# Patient Record
Sex: FEMALE | Race: BLACK OR AFRICAN AMERICAN | Employment: FULL TIME | URBAN - METROPOLITAN AREA
[De-identification: names, ages, dates, MRNs, and addresses within clinical notes are randomized per-mention and may not be internally consistent; named-entity substitution may affect disease eponyms.]

---

## 2018-04-26 LAB
ANTIBODY: NORMAL
HIV AG/AB: NORMAL
HIV AG/AB: NORMAL

## 2019-11-25 ENCOUNTER — HOSPITAL ENCOUNTER (OUTPATIENT)
Dept: GENERAL RADIOLOGY | Age: 59
Discharge: HOME | End: 2019-11-25
Payer: COMMERCIAL

## 2019-11-25 ENCOUNTER — HOSPITAL ENCOUNTER (OUTPATIENT)
Age: 59
Discharge: HOME | End: 2019-11-25
Payer: COMMERCIAL

## 2019-11-25 ENCOUNTER — OFFICE VISIT (OUTPATIENT)
Dept: PRIMARY CARE CLINIC | Age: 59
End: 2019-11-25
Payer: COMMERCIAL

## 2019-11-25 VITALS
WEIGHT: 177.8 LBS | TEMPERATURE: 98.2 F | HEART RATE: 61 BPM | HEIGHT: 62 IN | SYSTOLIC BLOOD PRESSURE: 130 MMHG | BODY MASS INDEX: 32.72 KG/M2 | OXYGEN SATURATION: 98 % | RESPIRATION RATE: 14 BRPM | DIASTOLIC BLOOD PRESSURE: 84 MMHG

## 2019-11-25 DIAGNOSIS — M79.672 BILATERAL FOOT PAIN: ICD-10-CM

## 2019-11-25 DIAGNOSIS — I10 ESSENTIAL HYPERTENSION: ICD-10-CM

## 2019-11-25 DIAGNOSIS — M79.671 BILATERAL FOOT PAIN: ICD-10-CM

## 2019-11-25 DIAGNOSIS — M54.2 NECK PAIN: Primary | ICD-10-CM

## 2019-11-25 DIAGNOSIS — M79.604 BILATERAL LEG PAIN: ICD-10-CM

## 2019-11-25 DIAGNOSIS — M25.572 LEFT ANKLE PAIN, UNSPECIFIED CHRONICITY: ICD-10-CM

## 2019-11-25 DIAGNOSIS — E03.9 ACQUIRED HYPOTHYROIDISM: ICD-10-CM

## 2019-11-25 DIAGNOSIS — M79.605 BILATERAL LEG PAIN: ICD-10-CM

## 2019-11-25 DIAGNOSIS — L30.9 DERMATITIS: ICD-10-CM

## 2019-11-25 LAB
ANION GAP SERPL CALCULATED.3IONS-SCNC: 16 MMOL/L (ref 3–16)
BUN BLDV-MCNC: 18 MG/DL (ref 7–20)
CALCIUM SERPL-MCNC: 10 MG/DL (ref 8.3–10.6)
CHLORIDE BLD-SCNC: 95 MMOL/L (ref 99–110)
CO2: 30 MMOL/L (ref 21–32)
CREAT SERPL-MCNC: 0.6 MG/DL (ref 0.6–1.2)
GFR AFRICAN AMERICAN: >60
GFR NON-AFRICAN AMERICAN: >60
GLUCOSE BLD-MCNC: 112 MG/DL (ref 70–99)
MAGNESIUM: 2.1 MG/DL (ref 1.8–2.4)
POTASSIUM SERPL-SCNC: 3.4 MMOL/L (ref 3.5–5.1)
SEDIMENTATION RATE, ERYTHROCYTE: 16 MM/HR (ref 0–30)
SODIUM BLD-SCNC: 141 MMOL/L (ref 136–145)
TOTAL CK: 273 U/L (ref 26–192)
TSH SERPL DL<=0.05 MIU/L-ACNC: 2.39 UIU/ML (ref 0.27–4.2)
VITAMIN D 25-HYDROXY: 38.9 NG/ML

## 2019-11-25 PROCEDURE — 73630 X-RAY EXAM OF FOOT: CPT

## 2019-11-25 PROCEDURE — 99204 OFFICE O/P NEW MOD 45 MIN: CPT | Performed by: INTERNAL MEDICINE

## 2019-11-25 PROCEDURE — 73610 X-RAY EXAM OF ANKLE: CPT

## 2019-11-25 RX ORDER — MAGNESIUM GLUCONATE 30 MG(550)
30 TABLET ORAL 2 TIMES DAILY
Status: ON HOLD | COMMUNITY
End: 2020-07-27 | Stop reason: HOSPADM

## 2019-11-25 RX ORDER — NAPROXEN SODIUM 220 MG
220-440 TABLET ORAL PRN
COMMUNITY
End: 2020-05-05

## 2019-11-25 RX ORDER — CHLORTHALIDONE 50 MG/1
50 TABLET ORAL DAILY
Status: ON HOLD | COMMUNITY
Start: 2019-11-25 | End: 2020-07-27 | Stop reason: HOSPADM

## 2019-11-25 RX ORDER — LEVOTHYROXINE SODIUM 88 UG/1
88 TABLET ORAL DAILY
COMMUNITY
Start: 2019-02-07 | End: 2021-02-23 | Stop reason: SDUPTHER

## 2019-11-25 RX ORDER — METHOCARBAMOL 500 MG/1
500 TABLET, FILM COATED ORAL NIGHTLY PRN
Qty: 15 TABLET | Refills: 0 | Status: SHIPPED | OUTPATIENT
Start: 2019-11-25 | End: 2020-05-05

## 2019-11-25 SDOH — HEALTH STABILITY: MENTAL HEALTH: HOW OFTEN DO YOU HAVE A DRINK CONTAINING ALCOHOL?: MONTHLY OR LESS

## 2019-11-25 SDOH — HEALTH STABILITY: MENTAL HEALTH: HOW MANY STANDARD DRINKS CONTAINING ALCOHOL DO YOU HAVE ON A TYPICAL DAY?: 1 OR 2

## 2019-11-25 ASSESSMENT — ENCOUNTER SYMPTOMS
BLOOD IN STOOL: 0
VOMITING: 0
WHEEZING: 0
DIARRHEA: 0
CHEST TIGHTNESS: 0
ABDOMINAL PAIN: 0
NAUSEA: 0
RHINORRHEA: 0
SINUS PRESSURE: 0
CONSTIPATION: 0
COUGH: 0
TROUBLE SWALLOWING: 0
SHORTNESS OF BREATH: 0
SORE THROAT: 0

## 2019-11-25 ASSESSMENT — PATIENT HEALTH QUESTIONNAIRE - PHQ9
SUM OF ALL RESPONSES TO PHQ QUESTIONS 1-9: 0
1. LITTLE INTEREST OR PLEASURE IN DOING THINGS: 0
SUM OF ALL RESPONSES TO PHQ QUESTIONS 1-9: 0
2. FEELING DOWN, DEPRESSED OR HOPELESS: 0
SUM OF ALL RESPONSES TO PHQ9 QUESTIONS 1 & 2: 0

## 2019-12-23 ENCOUNTER — OFFICE VISIT (OUTPATIENT)
Dept: PRIMARY CARE CLINIC | Age: 59
End: 2019-12-23
Payer: COMMERCIAL

## 2019-12-23 VITALS
DIASTOLIC BLOOD PRESSURE: 76 MMHG | WEIGHT: 179 LBS | HEART RATE: 85 BPM | TEMPERATURE: 98.1 F | RESPIRATION RATE: 14 BRPM | HEIGHT: 62 IN | OXYGEN SATURATION: 96 % | BODY MASS INDEX: 32.94 KG/M2 | SYSTOLIC BLOOD PRESSURE: 130 MMHG

## 2019-12-23 DIAGNOSIS — M79.672 BILATERAL FOOT PAIN: ICD-10-CM

## 2019-12-23 DIAGNOSIS — M79.605 BILATERAL LEG PAIN: ICD-10-CM

## 2019-12-23 DIAGNOSIS — M54.2 NECK PAIN: ICD-10-CM

## 2019-12-23 DIAGNOSIS — M25.572 LEFT ANKLE PAIN, UNSPECIFIED CHRONICITY: ICD-10-CM

## 2019-12-23 DIAGNOSIS — Z13.6 SCREENING FOR ISCHEMIC HEART DISEASE (IHD): ICD-10-CM

## 2019-12-23 DIAGNOSIS — E03.9 ACQUIRED HYPOTHYROIDISM: ICD-10-CM

## 2019-12-23 DIAGNOSIS — R74.8 ELEVATED CK: ICD-10-CM

## 2019-12-23 DIAGNOSIS — R73.9 HYPERGLYCEMIA: ICD-10-CM

## 2019-12-23 DIAGNOSIS — M79.604 BILATERAL LEG PAIN: ICD-10-CM

## 2019-12-23 DIAGNOSIS — Z00.00 ANNUAL PHYSICAL EXAM: Primary | ICD-10-CM

## 2019-12-23 DIAGNOSIS — M79.671 BILATERAL FOOT PAIN: ICD-10-CM

## 2019-12-23 DIAGNOSIS — M84.375A STRESS FRACTURE OF LEFT FOOT, INITIAL ENCOUNTER: ICD-10-CM

## 2019-12-23 DIAGNOSIS — I10 ESSENTIAL HYPERTENSION: ICD-10-CM

## 2019-12-23 DIAGNOSIS — F51.01 PRIMARY INSOMNIA: ICD-10-CM

## 2019-12-23 DIAGNOSIS — R94.31 ABNORMAL EKG: ICD-10-CM

## 2019-12-23 LAB
BILIRUBIN URINE: NEGATIVE
BLOOD, URINE: NEGATIVE
CLARITY: CLEAR
COLOR: YELLOW
GLUCOSE URINE: NEGATIVE MG/DL
KETONES, URINE: NEGATIVE MG/DL
LEUKOCYTE ESTERASE, URINE: NEGATIVE
MICROSCOPIC EXAMINATION: NORMAL
NITRITE, URINE: NEGATIVE
PH UA: 7 (ref 5–8)
PROTEIN UA: NEGATIVE MG/DL
SPECIFIC GRAVITY UA: 1.01 (ref 1–1.03)
URINE TYPE: NORMAL
UROBILINOGEN, URINE: 0.2 E.U./DL

## 2019-12-23 PROCEDURE — 81003 URINALYSIS AUTO W/O SCOPE: CPT | Performed by: INTERNAL MEDICINE

## 2019-12-23 PROCEDURE — 93000 ELECTROCARDIOGRAM COMPLETE: CPT | Performed by: INTERNAL MEDICINE

## 2019-12-23 PROCEDURE — 99396 PREV VISIT EST AGE 40-64: CPT | Performed by: INTERNAL MEDICINE

## 2019-12-23 RX ORDER — TRAZODONE HYDROCHLORIDE 50 MG/1
50 TABLET ORAL NIGHTLY PRN
Qty: 30 TABLET | Refills: 0 | Status: SHIPPED | OUTPATIENT
Start: 2019-12-23 | End: 2020-01-20

## 2019-12-27 DIAGNOSIS — R73.9 HYPERGLYCEMIA: ICD-10-CM

## 2019-12-27 DIAGNOSIS — Z00.00 ANNUAL PHYSICAL EXAM: ICD-10-CM

## 2019-12-27 DIAGNOSIS — R74.8 ELEVATED CK: ICD-10-CM

## 2019-12-27 LAB
A/G RATIO: 2 (ref 1.1–2.2)
ALBUMIN SERPL-MCNC: 4.6 G/DL (ref 3.4–5)
ALP BLD-CCNC: 64 U/L (ref 40–129)
ALT SERPL-CCNC: 26 U/L (ref 10–40)
ANION GAP SERPL CALCULATED.3IONS-SCNC: 15 MMOL/L (ref 3–16)
AST SERPL-CCNC: 20 U/L (ref 15–37)
BASOPHILS ABSOLUTE: 0 K/UL (ref 0–0.2)
BASOPHILS RELATIVE PERCENT: 0.4 %
BILIRUB SERPL-MCNC: 0.6 MG/DL (ref 0–1)
BUN BLDV-MCNC: 14 MG/DL (ref 7–20)
CALCIUM SERPL-MCNC: 9.9 MG/DL (ref 8.3–10.6)
CHLORIDE BLD-SCNC: 93 MMOL/L (ref 99–110)
CHOLESTEROL, TOTAL: 195 MG/DL (ref 0–199)
CO2: 32 MMOL/L (ref 21–32)
CREAT SERPL-MCNC: 0.8 MG/DL (ref 0.6–1.2)
EOSINOPHILS ABSOLUTE: 0.1 K/UL (ref 0–0.6)
EOSINOPHILS RELATIVE PERCENT: 1.1 %
GFR AFRICAN AMERICAN: >60
GFR NON-AFRICAN AMERICAN: >60
GLOBULIN: 2.3 G/DL
GLUCOSE BLD-MCNC: 128 MG/DL (ref 70–99)
HCT VFR BLD CALC: 44.2 % (ref 36–48)
HDLC SERPL-MCNC: 61 MG/DL (ref 40–60)
HEMOGLOBIN: 14.9 G/DL (ref 12–16)
LDL CHOLESTEROL CALCULATED: 112 MG/DL
LYMPHOCYTES ABSOLUTE: 1.4 K/UL (ref 1–5.1)
LYMPHOCYTES RELATIVE PERCENT: 23.5 %
MCH RBC QN AUTO: 30.1 PG (ref 26–34)
MCHC RBC AUTO-ENTMCNC: 33.7 G/DL (ref 31–36)
MCV RBC AUTO: 89.3 FL (ref 80–100)
MONOCYTES ABSOLUTE: 0.6 K/UL (ref 0–1.3)
MONOCYTES RELATIVE PERCENT: 10.7 %
NEUTROPHILS ABSOLUTE: 3.8 K/UL (ref 1.7–7.7)
NEUTROPHILS RELATIVE PERCENT: 64.3 %
PDW BLD-RTO: 13.9 % (ref 12.4–15.4)
PLATELET # BLD: 198 K/UL (ref 135–450)
PMV BLD AUTO: 9.1 FL (ref 5–10.5)
POTASSIUM SERPL-SCNC: 3.5 MMOL/L (ref 3.5–5.1)
RBC # BLD: 4.95 M/UL (ref 4–5.2)
SODIUM BLD-SCNC: 140 MMOL/L (ref 136–145)
TOTAL CK: 213 U/L (ref 26–192)
TOTAL PROTEIN: 6.9 G/DL (ref 6.4–8.2)
TRIGL SERPL-MCNC: 111 MG/DL (ref 0–150)
VLDLC SERPL CALC-MCNC: 22 MG/DL
WBC # BLD: 6 K/UL (ref 4–11)

## 2019-12-28 LAB
ESTIMATED AVERAGE GLUCOSE: 122.6 MG/DL
HBA1C MFR BLD: 5.9 %

## 2019-12-29 ASSESSMENT — ENCOUNTER SYMPTOMS
SINUS PRESSURE: 0
RECTAL PAIN: 0
WHEEZING: 0
TROUBLE SWALLOWING: 0
SHORTNESS OF BREATH: 0
APNEA: 0
ABDOMINAL DISTENTION: 0
PHOTOPHOBIA: 0
NAUSEA: 0
CHOKING: 0
RHINORRHEA: 0
EYE ITCHING: 0
CHEST TIGHTNESS: 0
FACIAL SWELLING: 0
SORE THROAT: 0
BACK PAIN: 0
DIARRHEA: 0
ANAL BLEEDING: 0
EYE PAIN: 0
VOMITING: 0
COUGH: 0
CONSTIPATION: 0
BLOOD IN STOOL: 0
SINUS PAIN: 0
VOICE CHANGE: 0
EYE DISCHARGE: 0
EYE REDNESS: 0
ABDOMINAL PAIN: 0

## 2020-01-20 RX ORDER — TRAZODONE HYDROCHLORIDE 50 MG/1
TABLET ORAL
Qty: 30 TABLET | Refills: 2 | Status: SHIPPED | OUTPATIENT
Start: 2020-01-20 | End: 2020-03-13 | Stop reason: SDUPTHER

## 2020-01-20 NOTE — TELEPHONE ENCOUNTER
Medication:   Requested Prescriptions     Pending Prescriptions Disp Refills    traZODone (DESYREL) 50 MG tablet [Pharmacy Med Name: traZODone 50 MG TABLET] 30 tablet 0     Sig: TAKE ONE TABLET BY MOUTH ONCE NIGHTLY AS NEEDED FOR SLEEP        Last Filled:      Patient Phone Number: [phone] (home) [phone] (work)    Last appt: 12/23/2019   Next appt: 1/31/2020    Last OARRS: No flowsheet data found.     Preferred Pharmacy:   [facility] [address] [place], [place] [address]Arabella Ragsdale   [address] [facility] [address]  Phone: [phone] Fax: [phone]

## 2020-01-31 ENCOUNTER — OFFICE VISIT (OUTPATIENT)
Dept: PRIMARY CARE CLINIC | Age: 60
End: 2020-01-31
Payer: COMMERCIAL

## 2020-01-31 VITALS
BODY MASS INDEX: 32.76 KG/M2 | SYSTOLIC BLOOD PRESSURE: 128 MMHG | DIASTOLIC BLOOD PRESSURE: 60 MMHG | HEART RATE: 85 BPM | HEIGHT: 62 IN | OXYGEN SATURATION: 99 % | WEIGHT: 178 LBS

## 2020-01-31 PROCEDURE — 99213 OFFICE O/P EST LOW 20 MIN: CPT | Performed by: INTERNAL MEDICINE

## 2020-01-31 RX ORDER — TRAZODONE HYDROCHLORIDE 100 MG/1
100 TABLET ORAL NIGHTLY PRN
Qty: 30 TABLET | Refills: 2 | Status: SHIPPED | OUTPATIENT
Start: 2020-01-31 | End: 2020-03-13 | Stop reason: SDUPTHER

## 2020-01-31 SDOH — ECONOMIC STABILITY: FOOD INSECURITY: WITHIN THE PAST 12 MONTHS, THE FOOD YOU BOUGHT JUST DIDN'T LAST AND YOU DIDN'T HAVE MONEY TO GET MORE.: NEVER TRUE

## 2020-01-31 SDOH — ECONOMIC STABILITY: TRANSPORTATION INSECURITY
IN THE PAST 12 MONTHS, HAS THE LACK OF TRANSPORTATION KEPT YOU FROM MEDICAL APPOINTMENTS OR FROM GETTING MEDICATIONS?: NO

## 2020-01-31 SDOH — ECONOMIC STABILITY: INCOME INSECURITY: HOW HARD IS IT FOR YOU TO PAY FOR THE VERY BASICS LIKE FOOD, HOUSING, MEDICAL CARE, AND HEATING?: NOT HARD AT ALL

## 2020-01-31 SDOH — ECONOMIC STABILITY: FOOD INSECURITY: WITHIN THE PAST 12 MONTHS, YOU WORRIED THAT YOUR FOOD WOULD RUN OUT BEFORE YOU GOT MONEY TO BUY MORE.: NEVER TRUE

## 2020-01-31 SDOH — ECONOMIC STABILITY: TRANSPORTATION INSECURITY
IN THE PAST 12 MONTHS, HAS LACK OF TRANSPORTATION KEPT YOU FROM MEETINGS, WORK, OR FROM GETTING THINGS NEEDED FOR DAILY LIVING?: NO

## 2020-01-31 ASSESSMENT — PATIENT HEALTH QUESTIONNAIRE - PHQ9
SUM OF ALL RESPONSES TO PHQ9 QUESTIONS 1 & 2: 0
SUM OF ALL RESPONSES TO PHQ QUESTIONS 1-9: 0
1. LITTLE INTEREST OR PLEASURE IN DOING THINGS: 0
2. FEELING DOWN, DEPRESSED OR HOPELESS: 0
SUM OF ALL RESPONSES TO PHQ QUESTIONS 1-9: 0

## 2020-01-31 ASSESSMENT — ENCOUNTER SYMPTOMS
ABDOMINAL PAIN: 0
SHORTNESS OF BREATH: 0

## 2020-01-31 NOTE — PROGRESS NOTES
Clau Colindres   Date ofBirth:  1960    Date of Visit:  1/31/2020    Chief Complaint   Patient presents with    Insomnia    Results       HPI  Primary insomnia-Pt takes Trazodone 50mg po qhs. Pt states it is helping her fall asleep but she wakes and it takes awhile to fall back asleep. Pt denies side effects. Pt also here to follow up on lab results. Review of Systems   Constitutional: Negative for activity change and fatigue. Respiratory: Negative for shortness of breath. Cardiovascular: Negative for chest pain. Gastrointestinal: Negative for abdominal pain. Neurological: Negative for dizziness and headaches. Psychiatric/Behavioral: Positive for sleep disturbance. Negative for decreased concentration and dysphoric mood. The patient is not nervous/anxious. No Known Allergies  Outpatient Medications Marked as Taking for the 1/31/20 encounter (Office Visit) with Adriana Henao MD   Medication Sig Dispense Refill    traZODone (DESYREL) 50 MG tablet TAKE ONE TABLET BY MOUTH ONCE NIGHTLY AS NEEDED FOR SLEEP 30 tablet 2    metoprolol tartrate (LOPRESSOR) 25 MG tablet Take 25 mg by mouth daily      Magnesium Gluconate 550 MG TABS Take 30 mg by mouth 2 times daily      levothyroxine (SYNTHROID) 88 MCG tablet Take 88 mcg by mouth daily      progesterone (PROMETRIUM) 200 MG capsule Take 200 mg by mouth nightly      naproxen sodium (ANAPROX) 220 MG tablet Take 220-440 mg by mouth as needed      diclofenac (SOLARAZE) 3 % gel Apply 3 % topically 2 times daily      methocarbamol (ROBAXIN) 500 MG tablet Take 1 tablet by mouth nightly as needed (low back pain) 15 tablet 0    chlorthalidone (HYGROTEN) 50 MG tablet Take 1 tablet by mouth daily           Vitals:    01/31/20 1001   BP: 128/60   Site: Right Upper Arm   Position: Sitting   Cuff Size: Medium Adult   Pulse: 85   SpO2: 99%   Weight: 178 lb (80.7 kg)   Height: 5' 2\" (1.575 m)     Body mass index is 32.56 kg/m².      Physical Exam  Nursing note reviewed. Constitutional:       General: She is not in acute distress. Appearance: She is well-developed. HENT:      Mouth/Throat:      Pharynx: Oropharynx is clear. Eyes:      Extraocular Movements: Extraocular movements intact. Pupils: Pupils are equal, round, and reactive to light. Cardiovascular:      Rate and Rhythm: Normal rate and regular rhythm. Heart sounds: Normal heart sounds, S1 normal and S2 normal. No murmur. Pulmonary:      Effort: Pulmonary effort is normal. No respiratory distress. Breath sounds: Normal breath sounds. Psychiatric:         Mood and Affect: Mood normal.           No results found for this visit on 01/31/20.   Lab Review   Orders Only on 12/27/2019   Component Date Value    Total CK 12/27/2019 213*    WBC 12/27/2019 6.0     RBC 12/27/2019 4.95     Hemoglobin 12/27/2019 14.9     Hematocrit 12/27/2019 44.2     MCV 12/27/2019 89.3     MCH 12/27/2019 30.1     MCHC 12/27/2019 33.7     RDW 12/27/2019 13.9     Platelets 56/36/0949 198     MPV 12/27/2019 9.1     Neutrophils % 12/27/2019 64.3     Lymphocytes % 12/27/2019 23.5     Monocytes % 12/27/2019 10.7     Eosinophils % 12/27/2019 1.1     Basophils % 12/27/2019 0.4     Neutrophils Absolute 12/27/2019 3.8     Lymphocytes Absolute 12/27/2019 1.4     Monocytes Absolute 12/27/2019 0.6     Eosinophils Absolute 12/27/2019 0.1     Basophils Absolute 12/27/2019 0.0     Cholesterol, Total 12/27/2019 195     Triglycerides 12/27/2019 111     HDL 12/27/2019 61*    LDL Calculated 12/27/2019 112*    VLDL Cholesterol Calcula* 12/27/2019 22     Hemoglobin A1C 12/27/2019 5.9     eAG 12/27/2019 122.6     Sodium 12/27/2019 140     Potassium 12/27/2019 3.5     Chloride 12/27/2019 93*    CO2 12/27/2019 32     Anion Gap 12/27/2019 15     Glucose 12/27/2019 128*    BUN 12/27/2019 14     CREATININE 12/27/2019 0.8     GFR Non- 12/27/2019 >60     GFR  American 12/27/2019 >60     Calcium 12/27/2019 9.9     Total Protein 12/27/2019 6.9     Alb 12/27/2019 4.6     Albumin/Globulin Ratio 12/27/2019 2.0     Total Bilirubin 12/27/2019 0.6     Alkaline Phosphatase 12/27/2019 64     ALT 12/27/2019 26     AST 12/27/2019 20     Globulin 12/27/2019 2.3          Assessment/Plan     1. Primary insomnia  - Increase traZODone (DESYREL) 100 MG tablet; Take 1 tablet by mouth nightly as needed for Sleep  Dispense: 30 tablet; Refill: 2    2. Prediabetes  -Low carbohydrate diet  -Regular aerobic exercise    3. Elevated CK  - CK; Future      Discussed medications with patient, who voiced understanding of their use and indications. All questions answered. Return in about 6 weeks (around 3/13/2020) for insomnia.

## 2020-01-31 NOTE — PATIENT INSTRUCTIONS
1. Primary insomnia  - Increase traZODone (DESYREL) 100 MG tablet; Take 1 tablet by mouth nightly as needed for Sleep  Dispense: 30 tablet; Refill: 2    2. Prediabetes  -Low carbohydrate diet  -Regular aerobic exercise    3. Elevated CK  - CK;  Future

## 2020-03-13 ENCOUNTER — OFFICE VISIT (OUTPATIENT)
Dept: PRIMARY CARE CLINIC | Age: 60
End: 2020-03-13
Payer: COMMERCIAL

## 2020-03-13 VITALS
DIASTOLIC BLOOD PRESSURE: 80 MMHG | SYSTOLIC BLOOD PRESSURE: 128 MMHG | HEART RATE: 61 BPM | BODY MASS INDEX: 32.2 KG/M2 | HEIGHT: 62 IN | OXYGEN SATURATION: 98 % | WEIGHT: 175 LBS

## 2020-03-13 DIAGNOSIS — R74.8 ELEVATED CK: ICD-10-CM

## 2020-03-13 LAB — TOTAL CK: 307 U/L (ref 26–192)

## 2020-03-13 PROCEDURE — 99213 OFFICE O/P EST LOW 20 MIN: CPT | Performed by: INTERNAL MEDICINE

## 2020-03-13 RX ORDER — TRAZODONE HYDROCHLORIDE 50 MG/1
25 TABLET ORAL NIGHTLY
Qty: 15 TABLET | Refills: 3 | Status: ON HOLD
Start: 2020-03-13 | End: 2020-07-27 | Stop reason: HOSPADM

## 2020-03-13 RX ORDER — TRAZODONE HYDROCHLORIDE 100 MG/1
100 TABLET ORAL NIGHTLY PRN
Qty: 30 TABLET | Refills: 3 | Status: SHIPPED | OUTPATIENT
Start: 2020-03-13 | End: 2020-09-10

## 2020-03-13 NOTE — PATIENT INSTRUCTIONS
1. Primary insomnia  - stable  - Continue traZODone (DESYREL) 50 MG tablet; Take 0.5 tablets by mouth nightly  Dispense: 15 tablet; Refill: 3  - Continue traZODone (DESYREL) 100 MG tablet; Take 1 tablet by mouth nightly as needed for Sleep  Dispense: 30 tablet; Refill: 3    2. Right hip pain  - XR HIP RIGHT (2-3 VIEWS); Future  - Tylenol or Aleve as needed  - Referral to [facility] MD LUCIO, Orthopedic Surgery, [facility]    3.  Right leg pain  -Tylenol or Aleve as needed  - Referral to [facility] MD LUCIO, Orthopedic Surgery, [facility]

## 2020-03-13 NOTE — PROGRESS NOTES
Neutrophils Absolute 12/27/2019 3.8     Lymphocytes Absolute 12/27/2019 1.4     Monocytes Absolute 12/27/2019 0.6     Eosinophils Absolute 12/27/2019 0.1     Basophils Absolute 12/27/2019 0.0     Cholesterol, Total 12/27/2019 195     Triglycerides 12/27/2019 111     HDL 12/27/2019 61*    LDL Calculated 12/27/2019 112*    VLDL Cholesterol Calcula* 12/27/2019 22     Hemoglobin A1C 12/27/2019 5.9     eAG 12/27/2019 122.6     Sodium 12/27/2019 140     Potassium 12/27/2019 3.5     Chloride 12/27/2019 93*    CO2 12/27/2019 32     Anion Gap 12/27/2019 15     Glucose 12/27/2019 128*    BUN 12/27/2019 14     CREATININE 12/27/2019 0.8     GFR Non- 12/27/2019 >60     GFR  12/27/2019 >60     Calcium 12/27/2019 9.9     Total Protein 12/27/2019 6.9     Alb 12/27/2019 4.6     Albumin/Globulin Ratio 12/27/2019 2.0     Total Bilirubin 12/27/2019 0.6     Alkaline Phosphatase 12/27/2019 64     ALT 12/27/2019 26     AST 12/27/2019 20     Globulin 12/27/2019 2.3    Office Visit on 12/23/2019   Component Date Value    Color, UA 12/23/2019 YELLOW     Clarity, UA 12/23/2019 Clear     Glucose, Ur 12/23/2019 Negative     Bilirubin Urine 12/23/2019 Negative     Ketones, Urine 12/23/2019 Negative     Specific Gravity, UA 12/23/2019 1.012     Blood, Urine 12/23/2019 Negative     pH, UA 12/23/2019 7.0     Protein, UA 12/23/2019 Negative     Urobilinogen, Urine 12/23/2019 0.2     Nitrite, Urine 12/23/2019 Negative     Leukocyte Esterase, Urine 12/23/2019 Negative     Microscopic Examination 12/23/2019 Not Indicated     Urine Type 12/23/2019 Cleancatch    Orders Only on 11/25/2019   Component Date Value    Sodium 11/25/2019 141     Potassium 11/25/2019 3.4*    Chloride 11/25/2019 95*    CO2 11/25/2019 30     Anion Gap 11/25/2019 16     Glucose 11/25/2019 112*    BUN 11/25/2019 18     CREATININE 11/25/2019 0.6     GFR Non- 11/25/2019 >60     GFR  11/25/2019 >60     Calcium 11/25/2019 10.0     Sed Rate 11/25/2019 16     Vit D, 25-Hydroxy 11/25/2019 38.9     Total CK 11/25/2019 273*    TSH 11/25/2019 2.39     Magnesium 11/25/2019 2.10          Assessment/Plan     1. Primary insomnia  - stable  - Continue traZODone (DESYREL) 50 MG tablet; Take 0.5 tablets by mouth nightly  Dispense: 15 tablet; Refill: 3  - Continue traZODone (DESYREL) 100 MG tablet; Take 1 tablet by mouth nightly as needed for Sleep  Dispense: 30 tablet; Refill: 3    2. Right hip pain  - XR HIP RIGHT (2-3 VIEWS); Future  - Tylenol or Aleve as needed  - Referral to [facility] MD LUCIO, Orthopedic Surgery, [facility]    3. Right leg pain  -Tylenol or Aleve as needed  - Referral to [facility] MD LUCIO, Orthopedic Surgery, [facility]        Discussed medications with patient, who voiced understanding of their use and indications. All questions answered. Return in about 3 months (around 6/13/2020) for insomnia, hypertension, prediabetes, hypothyroidism.

## 2020-03-18 ASSESSMENT — ENCOUNTER SYMPTOMS
COUGH: 0
SHORTNESS OF BREATH: 0
ABDOMINAL PAIN: 0
CHEST TIGHTNESS: 0

## 2020-03-24 ENCOUNTER — TELEPHONE (OUTPATIENT)
Dept: PRIMARY CARE CLINIC | Age: 60
End: 2020-03-24

## 2020-05-03 NOTE — PROGRESS NOTES
evidence of synovitis. Total CPK, CRP and ESR wnl on 3/30/17. Pt was diagnosed w/ OA but did not return for f/u. Denies known FHx of autoimmune disease. She is a former smoker of 5 yrs. REVIEW OF SYSTEMS:    Constitutional: worsening insomnia (gets 4-5 hrs of sleep/night) and fatigue since the beginning of 2020 per pt Sx worsened after menopause, denies fever/chills, night sweats, unintentional weight loss  Integumentary: denies photosensitivity, rash, patchy alopecia, or Sx of Raynaud's phenomenon  Eyes: denies dry eyes, redness or pain, visual disturbance, or floaters  Ears: denies hearing loss, tinnitus, vertigo, or recurrent ear infections  Nose: denies nasal ulcers or recurrent sinusitis  Oral cavity: denies dry mouth or oral ulcers  Cardiovascular: denies CP, palpitations, Hx of pericardial effusion or pericarditis  Respiratory: denies SOB, cough, hemoptysis, or pleurisy  Gastrointestinal: denies heart burn, dysphagia or esophageal dysmotility, denies change in bowel habits or Sx of IBD  Genitourinary: denies change in urine amount or urine appearance, denies frothy urine or Hx of nephrolithiasis  Hematologic/Lymphatic: denies abnormal bruising or bleeding, denies Hx of blood clots or recurrent miscarriages, denies swollen LNs  Musculoskeletal:  refer to above HPI   Neurological: denies focal weakness, paresthesias/hyperesthesias or change in sensation, denies Hx of seizure, denies change in gait, balance, or memory  Psychiatric: denies Hx of depression or anxiety  Endocrine: denies cold or heat intolerance  Allergic/Immunologic: denies nasal congestion, chronic asthma, or hives    Past Medical History:   Diagnosis Date    Acquired hypothyroidism 11/25/2019    Essential hypertension 11/25/2019        No past surgical history on file.     Social History     Socioeconomic History    Marital status:      Spouse name: Not on file    Number of children: Not on file    Years of education: Not tartrate (LOPRESSOR) 25 MG tablet, Take 25 mg by mouth daily, Disp: , Rfl:     Magnesium Gluconate 550 MG TABS, Take 30 mg by mouth 2 times daily, Disp: , Rfl:     levothyroxine (SYNTHROID) 88 MCG tablet, Take 88 mcg by mouth daily, Disp: , Rfl:     progesterone (PROMETRIUM) 200 MG capsule, Take 200 mg by mouth nightly, Disp: , Rfl:     diclofenac (SOLARAZE) 3 % gel, Apply 3 % topically 2 times daily, Disp: , Rfl:     chlorthalidone (HYGROTEN) 50 MG tablet, Take 1 tablet by mouth daily, Disp: , Rfl:     ALLERGIES:  Patient has no known allergies.     PHYSICAL EXAM:    Vitals:    /74   Pulse 65   Ht 5' 2\" (1.575 m)   Wt 175 lb (79.4 kg)   BMI 32.01 kg/m²     GEN: AAOx3, in NAD, well-appearing  HEAD: normocephalic, atraumatic  EYES: EOMI, PERRLA, no injection or icterus  NECK: supple w/ FROM, of evidence of lymphadenopathy  CVS: RRR  LUNGS: in no acute respiratory distress, CTAB  ABDOMEN: +BS, soft, NT/ND  LYMPH: no cervical, supraclavicular or axillary LAD  MSK:  Spine: no kyphosis or lordosis, C-spine w/ good ROM, there is myofascial tenderness over the R posterior flank region radiating down to the R SI joint which is also TTP, straight leg test on R side produces R lower back/hip discomfort, negative straight leg test on L side  Upper extremities:   Hands: no synovitis or dactylitis, +[place] nodes, joints NTTP, able to make strong full fists   Wrist: no synovitis in the wrist joints b/l, FROM   Elbow: no synovitis or bursitis, FROM   Shoulders: no pain or swelling or warmth on palpation, FROM  Lower extremities: no calf tenderness on R side, no palpable mass or swelling appreciated   Hip: +BERRY test on R side produces ipsilateral hip/buttock pain, -BERRY test on L side, trochanteric bursa NTTP b/l   Knees: no warmth or effusion present, FROM   Ankles: no synovitis, FROM, Achilles tendons w/o swelling or warmth NTTP   Feet: no toe swelling or pain or warmth on palpation w/ FROM, negative MTP appreciated in the other extremities. Discussed the clinical significance of her mildly elevated CPK, repeat along w/ aldolase, myositis panel and inflammatory markers today. Low clinical suspicion for autoimmune myositis. Pt also denies Hx of statin exposure. Suspect her R medial calf/shin pain could be referred from hip/low back pathology. Agree w/ X-ray of R hip and ordered X-ray of L-spine in addition. Consider MRI of R hip if negative radiographic findings, need to r/o fx/AVN. Start trial of Flexeril 5-10 mg QHS. Pt reports partial relief to Naproxen 440 mg daily, increase dose to 500 mg BID PRN. She may combine this w/ Acetaminophen. Orders Placed This Encounter   Procedures    XR LUMBAR SPINE (2-3 VIEWS)     Standing Status:   Future     Number of Occurrences:   1     Standing Expiration Date:   5/5/2021     Order Specific Question:   Reason for exam:     Answer:   evaluate for inflammatory changes of spondyloarthropathies vs degenerative arthritis    CBC Auto Differential     Standing Status:   Future     Standing Expiration Date:   11/5/2020    Comprehensive Metabolic Panel     Standing Status:   Future     Standing Expiration Date:   11/5/2020    C-Reactive Protein     Standing Status:   Future     Standing Expiration Date:   11/5/2020    Sedimentation Rate     Standing Status:   Future     Standing Expiration Date:   11/5/2020    CK     Standing Status:   Future     Standing Expiration Date:   6/5/2020    Aldolase     Standing Status:   Future     Standing Expiration Date:   6/5/2020    Miscellaneous Lab Test #1     Standing Status:   Future     Standing Expiration Date:   5/5/2021     Order Specific Question:   Specify Req.  Test (1 Test/Order)     Answer:   Myositis Panel [id number]    TSH WITH REFLEX TO FT4     Standing Status:   Future     Standing Expiration Date:   9/5/2020     Outpatient Encounter Medications as of 5/5/2020   Medication Sig Dispense Refill    naproxen

## 2020-05-05 ENCOUNTER — HOSPITAL ENCOUNTER (OUTPATIENT)
Dept: GENERAL RADIOLOGY | Age: 60
Discharge: HOME | End: 2020-05-05
Payer: COMMERCIAL

## 2020-05-05 ENCOUNTER — HOSPITAL ENCOUNTER (OUTPATIENT)
Age: 60
Discharge: HOME | End: 2020-05-05
Payer: COMMERCIAL

## 2020-05-05 ENCOUNTER — OFFICE VISIT (OUTPATIENT)
Dept: RHEUMATOLOGY | Age: 60
End: 2020-05-05
Payer: COMMERCIAL

## 2020-05-05 VITALS
DIASTOLIC BLOOD PRESSURE: 74 MMHG | WEIGHT: 175 LBS | HEIGHT: 62 IN | HEART RATE: 65 BPM | SYSTOLIC BLOOD PRESSURE: 117 MMHG | BODY MASS INDEX: 32.2 KG/M2

## 2020-05-05 PROCEDURE — 73502 X-RAY EXAM HIP UNI 2-3 VIEWS: CPT

## 2020-05-05 PROCEDURE — 99214 OFFICE O/P EST MOD 30 MIN: CPT | Performed by: INTERNAL MEDICINE

## 2020-05-05 PROCEDURE — 72100 X-RAY EXAM L-S SPINE 2/3 VWS: CPT

## 2020-05-05 RX ORDER — NAPROXEN 500 MG/1
500 TABLET ORAL 2 TIMES DAILY WITH MEALS
Qty: 60 TABLET | Refills: 0 | Status: ON HOLD
Start: 2020-05-05 | End: 2020-07-27 | Stop reason: HOSPADM

## 2020-05-05 RX ORDER — CYCLOBENZAPRINE HCL 5 MG
10 TABLET ORAL NIGHTLY PRN
Qty: 60 TABLET | Refills: 0 | Status: SHIPPED | OUTPATIENT
Start: 2020-05-05 | End: 2020-06-04

## 2020-05-05 NOTE — PATIENT INSTRUCTIONS
After your visit with Dr. Zeny Mariee today, please obtain all labs and imaging as ordered prior to your 2 week follow up visit. Please make sure to obtain all X-rays at a [facility]) facility as Dr. Zeny Mariee needs to personally review the films to make the proper diagnosis for you. If you are referred to another doctor, make sure to call the provided number to schedule an appointment for yourself. If you dont hear anything from that doctors office after a few business days, please give our office a call so we help you or reach out to them on your own if you can find their contact information    Please note:   Lab and imaging results will be discussed at follow up appointments (not over the phone or via [phone]). If you would like a copy of your labs before your follow up appointment, you can call the office and request for them to be mailed to you. Take pictures on your phone! Many manifestations of rheumatic disease are transient. If you take a picture of your bothersome physical finding (rash, swollen joint, ulcer, etc), we will have more information at your next appointment. Prescriptions and refills will be handled at scheduled office visits and enough medicine will be prescribed to last at least until the patients next appointment. Since we expect to fill prescriptions at the office visit, running out may be a signal that it is time to call our office to schedule an appointment    Patients with rheumatic disease are more susceptible to a variety of infections, whether or not they are on medicine to suppress their immune system.  Please discuss vaccinations with your primary care doctor,  including but not limited to:  o Influenza vaccination (yearly)  o Pneumococcal vaccinations  o Shingles vaccination  o HPV vaccines (SLE patients have a higher rate of HPV infection and precancerous cervical lesions)  o TDaP vaccination  o Hepatitis B vaccination    Patients with rheumatic disease are at a higher risk

## 2020-05-18 ENCOUNTER — HOSPITAL ENCOUNTER (OUTPATIENT)
Age: 60
Discharge: HOME | End: 2020-05-18
Payer: COMMERCIAL

## 2020-05-18 LAB
A/G RATIO: 1.6 (ref 1.1–2.2)
ALBUMIN SERPL-MCNC: 4.4 G/DL (ref 3.4–5)
ALP BLD-CCNC: 56 U/L (ref 40–129)
ALT SERPL-CCNC: 20 U/L (ref 10–40)
ANION GAP SERPL CALCULATED.3IONS-SCNC: 12 MMOL/L (ref 3–16)
AST SERPL-CCNC: 20 U/L (ref 15–37)
BASOPHILS ABSOLUTE: 0 K/UL (ref 0–0.2)
BASOPHILS RELATIVE PERCENT: 0.1 %
BILIRUB SERPL-MCNC: 0.5 MG/DL (ref 0–1)
BUN BLDV-MCNC: 16 MG/DL (ref 7–20)
C-REACTIVE PROTEIN: 0.9 MG/L (ref 0–5.1)
CALCIUM SERPL-MCNC: 9.6 MG/DL (ref 8.3–10.6)
CHLORIDE BLD-SCNC: 96 MMOL/L (ref 99–110)
CO2: 31 MMOL/L (ref 21–32)
CREAT SERPL-MCNC: 0.8 MG/DL (ref 0.6–1.2)
EOSINOPHILS ABSOLUTE: 0.1 K/UL (ref 0–0.6)
EOSINOPHILS RELATIVE PERCENT: 1.1 %
GFR AFRICAN AMERICAN: >60
GFR NON-AFRICAN AMERICAN: >60
GLOBULIN: 2.8 G/DL
GLUCOSE BLD-MCNC: 114 MG/DL (ref 70–99)
HCT VFR BLD CALC: 45.1 % (ref 36–48)
HEMOGLOBIN: 15.1 G/DL (ref 12–16)
LYMPHOCYTES ABSOLUTE: 1.5 K/UL (ref 1–5.1)
LYMPHOCYTES RELATIVE PERCENT: 22.6 %
MCH RBC QN AUTO: 30.1 PG (ref 26–34)
MCHC RBC AUTO-ENTMCNC: 33.6 G/DL (ref 31–36)
MCV RBC AUTO: 89.6 FL (ref 80–100)
MONOCYTES ABSOLUTE: 0.5 K/UL (ref 0–1.3)
MONOCYTES RELATIVE PERCENT: 8.2 %
NEUTROPHILS ABSOLUTE: 4.5 K/UL (ref 1.7–7.7)
NEUTROPHILS RELATIVE PERCENT: 68 %
PDW BLD-RTO: 14 % (ref 12.4–15.4)
PLATELET # BLD: 185 K/UL (ref 135–450)
PMV BLD AUTO: 9 FL (ref 5–10.5)
POTASSIUM SERPL-SCNC: 3.4 MMOL/L (ref 3.5–5.1)
RBC # BLD: 5.03 M/UL (ref 4–5.2)
SEDIMENTATION RATE, ERYTHROCYTE: 10 MM/HR (ref 0–30)
SODIUM BLD-SCNC: 139 MMOL/L (ref 136–145)
T4 FREE: 1.5 NG/DL (ref 0.9–1.8)
TOTAL CK: 344 U/L (ref 26–192)
TOTAL PROTEIN: 7.2 G/DL (ref 6.4–8.2)
TSH REFLEX FT4: 4.27 UIU/ML (ref 0.27–4.2)
WBC # BLD: 6.7 K/UL (ref 4–11)

## 2020-05-18 PROCEDURE — 80053 COMPREHEN METABOLIC PANEL: CPT

## 2020-05-18 PROCEDURE — 83516 IMMUNOASSAY NONANTIBODY: CPT

## 2020-05-18 PROCEDURE — 85025 COMPLETE CBC W/AUTO DIFF WBC: CPT

## 2020-05-18 PROCEDURE — 82085 ASSAY OF ALDOLASE: CPT

## 2020-05-18 PROCEDURE — 86235 NUCLEAR ANTIGEN ANTIBODY: CPT

## 2020-05-18 PROCEDURE — 86140 C-REACTIVE PROTEIN: CPT

## 2020-05-18 PROCEDURE — 36415 COLL VENOUS BLD VENIPUNCTURE: CPT

## 2020-05-18 PROCEDURE — 84443 ASSAY THYROID STIM HORMONE: CPT

## 2020-05-18 PROCEDURE — 85652 RBC SED RATE AUTOMATED: CPT

## 2020-05-18 PROCEDURE — 82550 ASSAY OF CK (CPK): CPT

## 2020-05-18 PROCEDURE — 84439 ASSAY OF FREE THYROXINE: CPT

## 2020-05-20 LAB — ALDOLASE: 4.9 U/L (ref 1.5–8.1)

## 2020-05-20 NOTE — PROGRESS NOTES
other joint pain, swelling or morning stiffness. Denies any cough, SOB, dysphagia, or rash. She denies any statin exposure. Rheumatologic ROS:  Constitutional: worsening insomnia (gets 4-5 hrs of sleep/night) and fatigue since the beginning of 2020 per pt Sx worsened after menopause, denies fever/chills, night sweats, unintentional weight loss  Integumentary: denies photosensitivity, rash, patchy alopecia, or Sx of Raynaud's phenomenon  Eyes: denies dry eyes, redness or pain, visual disturbance, or floaters  Ears: denies hearing loss, tinnitus, vertigo, or recurrent ear infections  Nose: denies nasal ulcers or recurrent sinusitis  Oral cavity: denies dry mouth or oral ulcers  Cardiovascular: denies CP, palpitations, Hx of pericardial effusion or pericarditis  Respiratory: denies SOB, cough, hemoptysis, or pleurisy  Gastrointestinal: denies heart burn, dysphagia or esophageal dysmotility, denies change in bowel habits or Sx of IBD  Musculoskeletal:  refer to above HPI     No Known Allergies    Past Medical History:        Diagnosis Date    Acquired hypothyroidism 11/25/2019    Essential hypertension 11/25/2019       Past Surgical History:    No past surgical history on file.     Medications:    Current Outpatient Medications   Medication Sig Dispense Refill    methocarbamol (ROBAXIN-750) 750 MG tablet Take 1 tablet by mouth 3 times daily 90 tablet 2    naproxen (NAPROSYN) 500 MG tablet Take 1 tablet by mouth 2 times daily (with meals) 60 tablet 0    cyclobenzaprine (FLEXERIL) 5 MG tablet Take 2 tablets by mouth nightly as needed for Muscle spasms 60 tablet 0    traZODone (DESYREL) 50 MG tablet Take 0.5 tablets by mouth nightly 15 tablet 3    traZODone (DESYREL) 100 MG tablet Take 1 tablet by mouth nightly as needed for Sleep 30 tablet 3    metoprolol tartrate (LOPRESSOR) 25 MG tablet Take 25 mg by mouth daily      Magnesium Gluconate 550 MG TABS Take 30 mg by mouth 2 times daily      levothyroxine

## 2020-05-22 ENCOUNTER — VIRTUAL VISIT (OUTPATIENT)
Dept: RHEUMATOLOGY | Age: 60
End: 2020-05-22
Payer: COMMERCIAL

## 2020-05-22 PROCEDURE — 99213 OFFICE O/P EST LOW 20 MIN: CPT | Performed by: INTERNAL MEDICINE

## 2020-05-22 RX ORDER — METHOCARBAMOL 750 MG/1
750 TABLET, FILM COATED ORAL 3 TIMES DAILY
Qty: 90 TABLET | Refills: 2 | Status: SHIPPED | OUTPATIENT
Start: 2020-05-22 | End: 2020-06-21

## 2020-06-01 LAB — MISCELLANEOUS LAB TEST ORDER: NORMAL

## 2020-06-26 ENCOUNTER — OFFICE VISIT (OUTPATIENT)
Dept: PRIMARY CARE CLINIC | Age: 60
End: 2020-06-26
Payer: COMMERCIAL

## 2020-06-26 PROCEDURE — 99213 OFFICE O/P EST LOW 20 MIN: CPT | Performed by: NURSE PRACTITIONER

## 2020-06-26 NOTE — PROGRESS NOTES
FLU/COVID-19 CLINIC EVALUATION  HPI: Patient is in office today with concern for a known close exposure to COVID-19 Virus. The patient is requesting screening evaluation and COVID-19 testing. Symptom duration, days:  [] 1   [] 2    []3   [] 4    []5    []6   [] 7    []8    []9   [] 10    []11 []  12   [] 13    []14 or greater    There were no vitals filed for this visit. ROS:  All systems reviewed and are negative unless marked. Constitutional: []Fevers []Chills   HENT: []Nasal Congestion []Loss of taste/smell []Sore throat   Respiratory: []Cough []Chest Congestion []Chest Congestion []Feeling short of breath  Cardiovascular: []Chest pain/heaviness  Gastrointestinal: []Nausea []Vomiting []Diarrhea  Musculoskeletal: []Muscle aches [] Fatigue   Neurological: []Headaches    OTHER SYMPTOMS:      Past Medical History:   Diagnosis Date    Acquired hypothyroidism 11/25/2019    Essential hypertension 11/25/2019     Social History     Socioeconomic History    Marital status:      Spouse name: Not on file    Number of children: Not on file    Years of education: Not on file    Highest education level: Not on file   Occupational History    Not on file   Social Needs    Financial resource strain: Not hard at all   [employer] insecurity     Worry: Never true     Inability: Never true    Transportation needs     Medical: No     Non-medical: No   Tobacco Use    Smoking status: Never Smoker    Smokeless tobacco: Never Used   Substance and Sexual Activity    Alcohol use:  Yes     Alcohol/week: 1.0 standard drinks     Types: 1 Glasses of wine per week     Frequency: Monthly or less     Drinks per session: 1 or 2    Drug use: Never    Sexual activity: Not Currently   Lifestyle    Physical activity     Days per week: Not on file     Minutes per session: Not on file    Stress: Not on file   Relationships    Social connections     Talks on phone: Not on file     Gets together: Not on file     Attends Yazidi service: Not on file     Active member of club or organization: Not on file     Attends meetings of clubs or organizations: Not on file     Relationship status: Not on file    Intimate partner violence     Fear of current or ex partner: Not on file     Emotionally abused: Not on file     Physically abused: Not on file     Forced sexual activity: Not on file   Other Topics Concern    Not on file   Social History Narrative    Not on file     Family History   Problem Relation Age of Onset    Diabetes Mother     Hypertension Mother     Hypertension Father        RISK FACTORS:  []Pregnancy   []Diabetes  []Heart disease  []Asthma  []COPD/Other chronic lung diseases  []Active Cancer/Chemotherapy   []Taking oral steroids  [x]Close contact with a lab confirmed COVID-19 patient within 14 days of symptom onset  []Health Care Worker Exposure no symptoms  []Health Care Worker Exposure symptomatic    Assessment  Physical Exam    Constitutional:       Appearance: Normal appearance. HENT:      Head: Normocephalic and atraumatic. Mouth/Throat:      Mouth: Mucous membranes are moist.   Neck:      Musculoskeletal: Normal range of motion. Cardiovascular:     Skin pink and warm     Pulmonary:      Effort: Pulmonary effort is normal.   Musculoskeletal: Normal range of motion. Skin:     General: Skin is warm and dry. Neurological:      General: No focal deficit present. Mental Status: Alert. Mental status is at baseline. Test ordered:    [x]COVID    _________  []Strep    ___________      Diagnosis and Plan:      Diagnosis Orders   1. Suspected COVID-19 virus infection  Covid-19 Ambulatory     COVID-19 swab complete at clinic and sent to lab for testing. Quarantine order in place, patient verbalized understanding.  Preventing the spread of Coronavirus, Possible COVID-19 exposure with self-quarantine, isolation instructions and management of symptoms, and to follow-up with primary care physician or

## 2020-06-28 LAB
SARS-COV-2: NOT DETECTED
SOURCE: NORMAL

## 2020-06-29 ENCOUNTER — OFFICE VISIT (OUTPATIENT)
Dept: CARDIOLOGY CLINIC | Age: 60
End: 2020-06-29
Payer: COMMERCIAL

## 2020-06-29 VITALS
HEIGHT: 62 IN | SYSTOLIC BLOOD PRESSURE: 110 MMHG | HEART RATE: 64 BPM | DIASTOLIC BLOOD PRESSURE: 70 MMHG | BODY MASS INDEX: 32.13 KG/M2 | WEIGHT: 174.6 LBS | TEMPERATURE: 97.3 F

## 2020-06-29 PROBLEM — R94.31 ABNORMAL ECG: Status: ACTIVE | Noted: 2020-06-29

## 2020-06-29 PROCEDURE — 93000 ELECTROCARDIOGRAM COMPLETE: CPT | Performed by: INTERNAL MEDICINE

## 2020-06-29 PROCEDURE — 99203 OFFICE O/P NEW LOW 30 MIN: CPT | Performed by: INTERNAL MEDICINE

## 2020-06-29 ASSESSMENT — ENCOUNTER SYMPTOMS
ABDOMINAL DISTENTION: 0
SHORTNESS OF BREATH: 0
COLOR CHANGE: 0
BLOOD IN STOOL: 0
CHEST TIGHTNESS: 0
VOMITING: 0
FACIAL SWELLING: 0
WHEEZING: 0
ABDOMINAL PAIN: 0
EYE DISCHARGE: 0
BACK PAIN: 0
COUGH: 0

## 2020-06-29 NOTE — PROGRESS NOTES
[facility]     Outpatient Cardiology         Chief Complaint   Patient presents with    Abnormal Test Results     referred by Dr. Joe Morel PCP for abnormal ECG       HPI     Jordyn Day a 56 y.o. female here for abnormal EKG. Referred by PCP. Hx of HTN. Today her hypertension is well controlled her current medications, no side effects. She did have an EKG last year and one today that showed an old septal infarct, no acute ischemic changes. Otherwise she feels totally fine and does not complain of any symptoms concerning for heart failure or coronary disease. PMH  Past Medical History:   Diagnosis Date    Acquired hypothyroidism 11/25/2019    Essential hypertension 11/25/2019       [facility]  Past Surgical History:   Procedure Laterality Date    DILATION AND CURETTAGE OF UTERUS          Social HIstory  Social History     Tobacco Use    Smoking status: Never Smoker    Smokeless tobacco: Never Used   Substance Use Topics    Alcohol use: Yes     Alcohol/week: 1.0 standard drinks     Types: 1 Glasses of wine per week     Frequency: Monthly or less     Drinks per session: 1 or 2    Drug use: Never       Family History  Family History   Problem Relation Age of Onset    Diabetes Mother     Hypertension Mother     Heart Attack Mother     Hypertension Father        Allergies   No Known Allergies    Medications:     Home Medications:  Were reviewed and are listed in nursing record. and/or listed below    Prior to Admission medications    Medication Sig Start Date End Date Taking?  Authorizing Provider   naproxen (NAPROSYN) 500 MG tablet Take 1 tablet by mouth 2 times daily (with meals) 5/5/20  Yes Jasiel Salamanca MD   traZODone (DESYREL) 50 MG tablet Take 0.5 tablets by mouth nightly 3/13/20  Yes Delgado Marquis MD   traZODone (DESYREL) 100 MG tablet Take 1 tablet by mouth nightly as needed for Sleep 3/13/20  Yes Deglado Marquis MD   metoprolol tartrate (LOPRESSOR) 25 MG tablet Take 25 mg by mouth daily 3/20/19  Yes Historical Provider, MD   Magnesium Gluconate 550 MG TABS Take 30 mg by mouth 2 times daily   Yes Historical Provider, MD   levothyroxine (SYNTHROID) 88 MCG tablet Take 88 mcg by mouth daily 2/7/19  Yes Historical Provider, MD   progesterone (PROMETRIUM) 200 MG capsule Take 200 mg by mouth nightly 10/18/19  Yes Historical Provider, MD   diclofenac (SOLARAZE) 3 % gel Apply 3 % topically 2 times daily   Yes Historical Provider, MD   chlorthalidone (HYGROTEN) 50 MG tablet Take 1 tablet by mouth daily 11/25/19  Yes Jaylan Argueta MD        Review of Systems   Constitutional: Negative for activity change, appetite change, diaphoresis, fatigue, fever and unexpected weight change. HENT: Negative for congestion, facial swelling, mouth sores and nosebleeds. Eyes: Negative for discharge and visual disturbance. Respiratory: Negative for cough, chest tightness, shortness of breath and wheezing. Cardiovascular: Negative for chest pain, palpitations and leg swelling. Gastrointestinal: Negative for abdominal distention, abdominal pain, blood in stool and vomiting. Endocrine: Negative for cold intolerance, heat intolerance and polyuria. Genitourinary: Negative for difficulty urinating, dysuria, frequency and hematuria. Musculoskeletal: Negative for back pain, joint swelling, myalgias and neck pain. Skin: Negative for color change, pallor and rash. Allergic/Immunologic: Negative for immunocompromised state. Neurological: Negative for dizziness, syncope, weakness, light-headedness, numbness and headaches. Hematological: Negative for adenopathy. Does not bruise/bleed easily. Psychiatric/Behavioral: Negative for behavioral problems, confusion, decreased concentration and suicidal ideas. The patient is not nervous/anxious.         Vitals:    06/29/20 1444   BP: 110/70   Pulse: 64   Temp: 97.3 °F (36.3 °C)    Weight: 174 lb 9.6 oz (79.2 kg)       Vitals:    06/29/20

## 2020-06-30 ENCOUNTER — TELEPHONE (OUTPATIENT)
Dept: CARDIOLOGY CLINIC | Age: 60
End: 2020-06-30

## 2020-07-09 ENCOUNTER — TELEPHONE (OUTPATIENT)
Dept: CARDIOLOGY CLINIC | Age: 60
End: 2020-07-09

## 2020-07-09 NOTE — TELEPHONE ENCOUNTER
Called patient to reschedule stress echo due to not having a Doctor during testing. Gave patient several options and she is going to call back. Patient needs to request time off work.

## 2020-07-24 ENCOUNTER — APPOINTMENT (OUTPATIENT)
Dept: GENERAL RADIOLOGY | Age: 60
DRG: 287 | End: 2020-07-24
Payer: COMMERCIAL

## 2020-07-24 ENCOUNTER — HOSPITAL ENCOUNTER (INPATIENT)
Age: 60
LOS: 4 days | Discharge: HOME | DRG: 287 | End: 2020-07-28
Attending: STUDENT IN AN ORGANIZED HEALTH CARE EDUCATION/TRAINING PROGRAM | Admitting: INTERNAL MEDICINE
Payer: COMMERCIAL

## 2020-07-24 PROBLEM — I20.9 ACUTE ANGINA (HCC): Status: ACTIVE | Noted: 2020-07-24

## 2020-07-24 LAB
ANION GAP SERPL CALCULATED.3IONS-SCNC: 13 MMOL/L (ref 3–16)
BASOPHILS ABSOLUTE: 0 K/UL (ref 0–0.2)
BASOPHILS RELATIVE PERCENT: 0.3 %
BUN BLDV-MCNC: 11 MG/DL (ref 7–20)
CALCIUM SERPL-MCNC: 10 MG/DL (ref 8.3–10.6)
CHLORIDE BLD-SCNC: 93 MMOL/L (ref 99–110)
CO2: 30 MMOL/L (ref 21–32)
CREAT SERPL-MCNC: 0.8 MG/DL (ref 0.6–1.2)
EKG ATRIAL RATE: 67 BPM
EKG DIAGNOSIS: NORMAL
EKG P AXIS: 60 DEGREES
EKG P-R INTERVAL: 174 MS
EKG Q-T INTERVAL: 408 MS
EKG QRS DURATION: 100 MS
EKG QTC CALCULATION (BAZETT): 431 MS
EKG R AXIS: -28 DEGREES
EKG T AXIS: 49 DEGREES
EKG VENTRICULAR RATE: 67 BPM
EOSINOPHILS ABSOLUTE: 0 K/UL (ref 0–0.6)
EOSINOPHILS RELATIVE PERCENT: 0.3 %
GFR AFRICAN AMERICAN: >60
GFR NON-AFRICAN AMERICAN: >60
GLUCOSE BLD-MCNC: 185 MG/DL (ref 70–99)
HCT VFR BLD CALC: 43.3 % (ref 36–48)
HEMOGLOBIN: 14.9 G/DL (ref 12–16)
LYMPHOCYTES ABSOLUTE: 1.5 K/UL (ref 1–5.1)
LYMPHOCYTES RELATIVE PERCENT: 18.2 %
MAGNESIUM: 2.1 MG/DL (ref 1.8–2.4)
MCH RBC QN AUTO: 30.8 PG (ref 26–34)
MCHC RBC AUTO-ENTMCNC: 34.4 G/DL (ref 31–36)
MCV RBC AUTO: 89.6 FL (ref 80–100)
MONOCYTES ABSOLUTE: 0.4 K/UL (ref 0–1.3)
MONOCYTES RELATIVE PERCENT: 4.9 %
NEUTROPHILS ABSOLUTE: 6.5 K/UL (ref 1.7–7.7)
NEUTROPHILS RELATIVE PERCENT: 76.3 %
PDW BLD-RTO: 13.4 % (ref 12.4–15.4)
PLATELET # BLD: 192 K/UL (ref 135–450)
PMV BLD AUTO: 8.9 FL (ref 5–10.5)
POTASSIUM REFLEX MAGNESIUM: 3.1 MMOL/L (ref 3.5–5.1)
RBC # BLD: 4.83 M/UL (ref 4–5.2)
SODIUM BLD-SCNC: 136 MMOL/L (ref 136–145)
TROPONIN: <0.01 NG/ML
WBC # BLD: 8.5 K/UL (ref 4–11)

## 2020-07-24 PROCEDURE — 36415 COLL VENOUS BLD VENIPUNCTURE: CPT

## 2020-07-24 PROCEDURE — 1200000000 HC SEMI PRIVATE

## 2020-07-24 PROCEDURE — 85025 COMPLETE CBC W/AUTO DIFF WBC: CPT

## 2020-07-24 PROCEDURE — 71046 X-RAY EXAM CHEST 2 VIEWS: CPT

## 2020-07-24 PROCEDURE — 99285 EMERGENCY DEPT VISIT HI MDM: CPT

## 2020-07-24 PROCEDURE — 80048 BASIC METABOLIC PNL TOTAL CA: CPT

## 2020-07-24 PROCEDURE — 84484 ASSAY OF TROPONIN QUANT: CPT

## 2020-07-24 PROCEDURE — 6370000000 HC RX 637 (ALT 250 FOR IP): Performed by: STUDENT IN AN ORGANIZED HEALTH CARE EDUCATION/TRAINING PROGRAM

## 2020-07-24 PROCEDURE — 83036 HEMOGLOBIN GLYCOSYLATED A1C: CPT

## 2020-07-24 PROCEDURE — 83735 ASSAY OF MAGNESIUM: CPT

## 2020-07-24 PROCEDURE — 93005 ELECTROCARDIOGRAM TRACING: CPT | Performed by: STUDENT IN AN ORGANIZED HEALTH CARE EDUCATION/TRAINING PROGRAM

## 2020-07-24 RX ORDER — ACETAMINOPHEN 650 MG/1
650 SUPPOSITORY RECTAL EVERY 6 HOURS PRN
Status: DISCONTINUED | OUTPATIENT
Start: 2020-07-24 | End: 2020-07-28 | Stop reason: HOSPADM

## 2020-07-24 RX ORDER — ASPIRIN 325 MG
325 TABLET ORAL ONCE
Status: COMPLETED | OUTPATIENT
Start: 2020-07-24 | End: 2020-07-24

## 2020-07-24 RX ORDER — ACETAMINOPHEN 325 MG/1
650 TABLET ORAL ONCE
Status: COMPLETED | OUTPATIENT
Start: 2020-07-24 | End: 2020-07-24

## 2020-07-24 RX ORDER — CHLORTHALIDONE 25 MG/1
50 TABLET ORAL DAILY
Status: DISCONTINUED | OUTPATIENT
Start: 2020-07-25 | End: 2020-07-25

## 2020-07-24 RX ORDER — PROMETHAZINE HYDROCHLORIDE 12.5 MG/1
12.5 TABLET ORAL EVERY 6 HOURS PRN
Status: DISCONTINUED | OUTPATIENT
Start: 2020-07-24 | End: 2020-07-28 | Stop reason: HOSPADM

## 2020-07-24 RX ORDER — NITROGLYCERIN 0.4 MG/1
0.4 TABLET SUBLINGUAL EVERY 5 MIN PRN
Status: DISCONTINUED | OUTPATIENT
Start: 2020-07-24 | End: 2020-07-28 | Stop reason: HOSPADM

## 2020-07-24 RX ORDER — MAGNESIUM SULFATE IN WATER 40 MG/ML
2 INJECTION, SOLUTION INTRAVENOUS PRN
Status: DISCONTINUED | OUTPATIENT
Start: 2020-07-24 | End: 2020-07-28 | Stop reason: HOSPADM

## 2020-07-24 RX ORDER — ONDANSETRON 2 MG/ML
4 INJECTION INTRAMUSCULAR; INTRAVENOUS EVERY 6 HOURS PRN
Status: DISCONTINUED | OUTPATIENT
Start: 2020-07-24 | End: 2020-07-27

## 2020-07-24 RX ORDER — SODIUM CHLORIDE 0.9 % (FLUSH) 0.9 %
10 SYRINGE (ML) INJECTION EVERY 12 HOURS SCHEDULED
Status: DISCONTINUED | OUTPATIENT
Start: 2020-07-24 | End: 2020-07-28 | Stop reason: HOSPADM

## 2020-07-24 RX ORDER — POLYETHYLENE GLYCOL 3350 17 G/17G
17 POWDER, FOR SOLUTION ORAL DAILY PRN
Status: DISCONTINUED | OUTPATIENT
Start: 2020-07-24 | End: 2020-07-28 | Stop reason: HOSPADM

## 2020-07-24 RX ORDER — POTASSIUM CHLORIDE 7.45 MG/ML
10 INJECTION INTRAVENOUS PRN
Status: DISCONTINUED | OUTPATIENT
Start: 2020-07-24 | End: 2020-07-28 | Stop reason: HOSPADM

## 2020-07-24 RX ORDER — LEVOTHYROXINE SODIUM 88 UG/1
88 TABLET ORAL
Status: DISCONTINUED | OUTPATIENT
Start: 2020-07-25 | End: 2020-07-28 | Stop reason: HOSPADM

## 2020-07-24 RX ORDER — ACETAMINOPHEN 325 MG/1
650 TABLET ORAL EVERY 6 HOURS PRN
Status: DISCONTINUED | OUTPATIENT
Start: 2020-07-24 | End: 2020-07-27

## 2020-07-24 RX ORDER — SODIUM CHLORIDE 0.9 % (FLUSH) 0.9 %
10 SYRINGE (ML) INJECTION PRN
Status: DISCONTINUED | OUTPATIENT
Start: 2020-07-24 | End: 2020-07-28 | Stop reason: HOSPADM

## 2020-07-24 RX ORDER — POTASSIUM CHLORIDE 1.5 G/1.77G
40 POWDER, FOR SOLUTION ORAL ONCE
Status: COMPLETED | OUTPATIENT
Start: 2020-07-24 | End: 2020-07-24

## 2020-07-24 RX ORDER — TRAZODONE HYDROCHLORIDE 100 MG/1
100 TABLET ORAL NIGHTLY PRN
Status: DISCONTINUED | OUTPATIENT
Start: 2020-07-24 | End: 2020-07-28 | Stop reason: HOSPADM

## 2020-07-24 RX ADMIN — NITROGLYCERIN 0.4 MG: 0.4 TABLET SUBLINGUAL at 20:03

## 2020-07-24 RX ADMIN — ACETAMINOPHEN 650 MG: 325 TABLET ORAL at 20:21

## 2020-07-24 RX ADMIN — NITROGLYCERIN 0.4 MG: 0.4 TABLET SUBLINGUAL at 20:21

## 2020-07-24 RX ADMIN — ASPIRIN 325 MG ORAL TABLET 325 MG: 325 PILL ORAL at 20:03

## 2020-07-24 RX ADMIN — POTASSIUM CHLORIDE 40 MEQ: 1.5 POWDER, FOR SOLUTION ORAL at 22:39

## 2020-07-24 ASSESSMENT — PAIN SCALES - GENERAL
PAINLEVEL_OUTOF10: 0
PAINLEVEL_OUTOF10: 5
PAINLEVEL_OUTOF10: 6
PAINLEVEL_OUTOF10: 5

## 2020-07-24 ASSESSMENT — PAIN DESCRIPTION - PAIN TYPE
TYPE: ACUTE PAIN
TYPE: ACUTE PAIN

## 2020-07-24 ASSESSMENT — PAIN DESCRIPTION - DESCRIPTORS: DESCRIPTORS: PRESSURE

## 2020-07-24 ASSESSMENT — HEART SCORE
ECG: 1
ECG: 0

## 2020-07-24 ASSESSMENT — PAIN DESCRIPTION - LOCATION
LOCATION: CHEST
LOCATION: CHEST

## 2020-07-24 ASSESSMENT — ENCOUNTER SYMPTOMS
ALLERGIC/IMMUNOLOGIC NEGATIVE: 1
EYES NEGATIVE: 1
COUGH: 0
NAUSEA: 1
VOMITING: 0
PHOTOPHOBIA: 0
SHORTNESS OF BREATH: 0

## 2020-07-24 ASSESSMENT — PAIN DESCRIPTION - ORIENTATION: ORIENTATION: LEFT;MID

## 2020-07-24 NOTE — ED PROVIDER NOTES
[facility] RESIDENT NOTE       Date of evaluation: 2020    Chief Complaint     Chest Pain (pressure to mid left chest since last night, -SOB, +nausea, )      History of Present Illness     Donavon Bryant is a 56 y.o. female who presents c/o pressure over the L side of her chest, nausea and a headache. The pressure started over the L side of her chest this morning, described as non-radiating, constant and rated 5/10. She felt temporarily numbness from the L of her shoulder to just above the elbow, but it has since resolved. This is the first time she has experienced chest pressure like this. She has felt nauseated and had a headache over the past 2 days. The headache started on her L side but has since progressed all over her head. She describes it as a constant pressure, non-radiating, but she can feel it behind her eyes as well. She thinks her sister has had a heart attack and that her mother may have  due to heart disease. Pt denies fevers, chills, changes in vision, SOB, syncope, weakness and vomiting. She has a PMH significant for HTN, hypothyroidism, and arthritis. Review of Systems     Review of Systems   Constitutional: Negative for chills and fever. HENT: Negative. Eyes: Negative. Negative for photophobia and visual disturbance. Respiratory: Negative for cough and shortness of breath. Cardiovascular: Positive for chest pain and palpitations. Gastrointestinal: Positive for nausea. Negative for vomiting. Endocrine: Negative. Genitourinary: Negative. Musculoskeletal: Positive for neck pain. Negative for neck stiffness. Skin: Negative. Allergic/Immunologic: Negative. Neurological: Positive for numbness and headaches. Negative for dizziness, syncope, weakness and light-headedness. Hematological: Negative. Psychiatric/Behavioral: Negative. All other systems reviewed and are negative.       Past Medical, Surgical, Family, and Social History     She has a past medical history of Acquired hypothyroidism and Essential hypertension. She has a past surgical history that includes Dilation and curettage of uterus. Her family history includes Diabetes in her mother; Heart Attack in her mother; Hypertension in her father and mother. She reports that she has never smoked. She has never used smokeless tobacco. She reports current alcohol use of about 1.0 standard drinks of alcohol per week. She reports that she does not use drugs. Medications     Previous Medications    CHLORTHALIDONE (HYGROTEN) 50 MG TABLET    Take 1 tablet by mouth daily    DICLOFENAC (SOLARAZE) 3 % GEL    Apply 3 % topically 2 times daily    LEVOTHYROXINE (SYNTHROID) 88 MCG TABLET    Take 88 mcg by mouth daily    MAGNESIUM GLUCONATE 550 MG TABS    Take 30 mg by mouth 2 times daily    METOPROLOL TARTRATE (LOPRESSOR) 25 MG TABLET    Take 25 mg by mouth daily    NAPROXEN (NAPROSYN) 500 MG TABLET    Take 1 tablet by mouth 2 times daily (with meals)    PROGESTERONE (PROMETRIUM) 200 MG CAPSULE    Take 200 mg by mouth nightly    TRAZODONE (DESYREL) 100 MG TABLET    Take 1 tablet by mouth nightly as needed for Sleep    TRAZODONE (DESYREL) 50 MG TABLET    Take 0.5 tablets by mouth nightly       Allergies     She has No Known Allergies. Physical Exam     INITIAL VITALS: BP: (!) 150/78, Temp: 98.6 °F (37 °C), Pulse: 68, Resp: 16, SpO2: 100 %   Physical Exam  Vitals signs and nursing note reviewed. Constitutional:       General: She is not in acute distress. Appearance: Normal appearance. She is obese. She is not ill-appearing or toxic-appearing. HENT:      Head: Normocephalic and atraumatic. Nose: Nose normal.      Mouth/Throat:      Mouth: Mucous membranes are moist.      Pharynx: Oropharynx is clear. No oropharyngeal exudate or posterior oropharyngeal erythema. Eyes:      Extraocular Movements: Extraocular movements intact. Conjunctiva/sclera: Conjunctivae normal.      Pupils: Pupils are equal, round, and reactive to light. Neck:      Musculoskeletal: Normal range of motion and neck supple. Cardiovascular:      Rate and Rhythm: Normal rate and regular rhythm. Pulses: Normal pulses. Heart sounds: Normal heart sounds. No murmur. No friction rub. No gallop. Pulmonary:      Effort: Pulmonary effort is normal. No respiratory distress. Breath sounds: Normal breath sounds. No stridor. No wheezing or rales. Abdominal:      General: Abdomen is flat. Palpations: Abdomen is soft. Tenderness: There is no abdominal tenderness. Musculoskeletal: Normal range of motion. Right lower leg: No edema. Left lower leg: No edema. Skin:     General: Skin is warm. Neurological:      General: No focal deficit present. Mental Status: She is alert and oriented to person, place, and time. Mental status is at baseline. Cranial Nerves: No cranial nerve deficit. Motor: No weakness. Gait: Gait normal.   Psychiatric:         Mood and Affect: Mood normal.         Diagnostic Results     EKG   Interpreted inconjunction with emergency department physician No att. providers found  Rhythm: normal sinus   Rate: normal  Axis: left  Ectopy: none  Conduction: normal  ST Segments: no acute change  T Waves: flattening in  v1, III, aVf and aVl  Q Waves: nonspecific  Clinical Impression: no acute changes  Comparison:  6/29/20    RADIOLOGY:  XR CHEST (2 VW)   Final Result      No acute pulmonary disease.              LABS:   Results for orders placed or performed during the hospital encounter of 07/24/20   CBC Auto Differential   Result Value Ref Range    WBC 8.5 4.0 - 11.0 K/uL    RBC 4.83 4.00 - 5.20 M/uL    Hemoglobin 14.9 12.0 - 16.0 g/dL    Hematocrit 43.3 36.0 - 48.0 %    MCV 89.6 80.0 - 100.0 fL    MCH 30.8 26.0 - 34.0 pg    MCHC 34.4 31.0 - 36.0 g/dL    RDW 13.4 12.4 - 15.4 %    Platelets 665 398 - 936 K/uL MPV 8.9 5.0 - 10.5 fL    Neutrophils % 76.3 %    Lymphocytes % 18.2 %    Monocytes % 4.9 %    Eosinophils % 0.3 %    Basophils % 0.3 %    Neutrophils Absolute 6.5 1.7 - 7.7 K/uL    Lymphocytes Absolute 1.5 1.0 - 5.1 K/uL    Monocytes Absolute 0.4 0.0 - 1.3 K/uL    Eosinophils Absolute 0.0 0.0 - 0.6 K/uL    Basophils Absolute 0.0 0.0 - 0.2 K/uL   Basic Metabolic Panel w/ Reflex to MG   Result Value Ref Range    Sodium 136 136 - 145 mmol/L    Potassium reflex Magnesium 3.1 (L) 3.5 - 5.1 mmol/L    Chloride 93 (L) 99 - 110 mmol/L    CO2 30 21 - 32 mmol/L    Anion Gap 13 3 - 16    Glucose 185 (H) 70 - 99 mg/dL    BUN 11 7 - 20 mg/dL    CREATININE 0.8 0.6 - 1.2 mg/dL    GFR Non-African American >60 >60    GFR African American >60 >60    Calcium 10.0 8.3 - 10.6 mg/dL   Troponin   Result Value Ref Range    Troponin <0.01 <0.01 ng/mL   Magnesium   Result Value Ref Range    Magnesium 2.10 1.80 - 2.40 mg/dL   EKG 12 Lead   Result Value Ref Range    Ventricular Rate 67 BPM    Atrial Rate 67 BPM    P-R Interval 174 ms    QRS Duration 100 ms    Q-T Interval 408 ms    QTc Calculation (Bazett) 431 ms    P Axis 60 degrees    R Axis -28 degrees    T Axis 49 degrees    Diagnosis       EKG performed in ER and to be interpreted by ER physician. Confirmed by MD, ER ([id number]),  Jean-Paul Crowder ([phone]) on 7/24/2020 7:35:55 PM         RECENT VITALS:  BP: 122/70, Temp: 98.6 °F (37 °C),Pulse: 66, Resp: 18, SpO2: 99 %         ED Course     Nursing Notes, Past Medical Hx, Past Surgical Hx, Social Hx, Allergies, and FamilyHx were reviewed.     The patient was giventhe following medications:  Orders Placed This Encounter   Medications    nitroGLYCERIN (NITROSTAT) SL tablet 0.4 mg    aspirin tablet 325 mg    acetaminophen (TYLENOL) tablet 650 mg    potassium chloride (KLOR-CON) packet 40 mEq       CONSULTS:  IP CONSULT TO HOSPITALIST    MEDICAL DECISION MAKING / ASSESSMENT / Rosy Carter is a 56 y.o. female with PMH significant for HTN, hypothyroidism and arthritis. She presents to the ED c/o pressure over the L side of her chest for the past day, described as non-radiating, constant and rated 5/10. She felt temporarily numbness from the L of her shoulder to just above the elbow, but it has since resolved. She has felt nauseated and had a headache over the past 2 days. The headache started on her L side but has since progressed all over her head. She describes it as a constant pressure, non-radiating, but she can feel it behind her eyes as well. Pt denies fevers, chills, changes in vision, SOB, syncope, weakness and vomiting. Today she is afebrile, and hemodynamically stable. Physical exam is unremarkable. RRR, no murmurs, rubs or gallops. She has equal breath sounds that are CTA bilaterally. Bedside ECG showed T wave flattening in leads III and V1. I have ordered a troponin, CBC, BMP, and CXR. I will give her  mg and nitroglycerin SL 0.4 mg. For the headache I have given her tylenol. CBC unremarkable. BMP revealed mild hypokalemia of 3.1, and hyperglycemia of 185. Troponin is negative. No acute pulmonary disease was identified by CXR. I will give her 40mEq potassium chloride to replete. Pts heart score is 5. Given her family hx heart disease, the old septal infarct identified on ECG by Dr. Oral Vogt, and the fact that she is scheduled for a stress test for 8/6/20, I will consult hospitalist for admission to follow up possible ACS. Pt has agreed with this plan. This patient was also evaluated by the attending physician. All care plans were discussed and agreed upon. Clinical Impression     1. ACS (acute coronary syndrome) ([facility] [address]    2. Hyperglycemia    3. Hypokalemia        Disposition     PATIENT REFERRED TO:  No follow-up provider specified.     DISCHARGE MEDICATIONS:  New Prescriptions    No medications on file       Maryam Jaimes, DO  07/24/20 2139       Theresa Lou, DO  07/24/20 2148

## 2020-07-24 NOTE — LETTER
[address] [facility] [address]  Phone: [phone]         July 28, 2020     Patient: Yesica Ojeda   YOB: 1960   Date of Discharge: 7/28/2020       To Whom It May Concern:    Kimberley Gupta was seen, treated at [facility] and dischared on 7/28/2020. The following work duties are recommended. She should remain out of work until 08/02/2020 and may return to work on 08/03/2020.       Sincerely,  Heather Cordero MD        Signature:__________________________________

## 2020-07-24 NOTE — LETTER
[facility]  [address] [address]  Phone: [phone]         July 28, 2020     Patient: Markel Dow   YOB: 1960   Date of Discharge: 7/24/2020       To Whom It May Concern:    Carlene Bautista was seen, treated at [facility] on discharged on 7/24/2020. The following work duties are recommended.     She should remain out of work until 08/02/2020 and may return to work on 08/03/2020        Sincerely,    Gregg Bartlett MD        Signature:__________________________________

## 2020-07-24 NOTE — LETTER
[facility]  [address] [place] [address]  Phone: [phone]         July 28, 2020     Patient: King Hodgkin   YOB: 1960   Date of Discharge: 07/24/2020       To Whom It May Concern:    Val Araujo was seen and treated at the [facility] and was discharged on 7/24/2020. The following work duties are recommended. She should remain out of work until 08/02/2020 and may return to work on 08/03/2020    Treatment and work recommendations given by the emergency department are initial emergency measures only, and follow up should be arranged as soon as possible with the company's occupational health provider* or the specialist to whom the worker was referred.         Sincerely,        Afshin Pizarro MD        Signature:__________________________________

## 2020-07-25 LAB
ANION GAP SERPL CALCULATED.3IONS-SCNC: 10 MMOL/L (ref 3–16)
BASOPHILS ABSOLUTE: 0 K/UL (ref 0–0.2)
BASOPHILS RELATIVE PERCENT: 0.2 %
BUN BLDV-MCNC: 10 MG/DL (ref 7–20)
CALCIUM SERPL-MCNC: 9.8 MG/DL (ref 8.3–10.6)
CHLORIDE BLD-SCNC: 96 MMOL/L (ref 99–110)
CO2: 31 MMOL/L (ref 21–32)
CREAT SERPL-MCNC: 0.7 MG/DL (ref 0.6–1.2)
EKG ATRIAL RATE: 55 BPM
EKG DIAGNOSIS: NORMAL
EKG P AXIS: 60 DEGREES
EKG P-R INTERVAL: 176 MS
EKG Q-T INTERVAL: 442 MS
EKG QRS DURATION: 94 MS
EKG QTC CALCULATION (BAZETT): 422 MS
EKG R AXIS: -42 DEGREES
EKG T AXIS: 39 DEGREES
EKG VENTRICULAR RATE: 55 BPM
EOSINOPHILS ABSOLUTE: 0 K/UL (ref 0–0.6)
EOSINOPHILS RELATIVE PERCENT: 0.6 %
ESTIMATED AVERAGE GLUCOSE: 119.8 MG/DL
GFR AFRICAN AMERICAN: >60
GFR NON-AFRICAN AMERICAN: >60
GLUCOSE BLD-MCNC: 109 MG/DL (ref 70–99)
HBA1C MFR BLD: 5.8 %
HCT VFR BLD CALC: 42 % (ref 36–48)
HEMOGLOBIN: 14.6 G/DL (ref 12–16)
LYMPHOCYTES ABSOLUTE: 2.2 K/UL (ref 1–5.1)
LYMPHOCYTES RELATIVE PERCENT: 30.7 %
MAGNESIUM: 2.1 MG/DL (ref 1.8–2.4)
MCH RBC QN AUTO: 30.7 PG (ref 26–34)
MCHC RBC AUTO-ENTMCNC: 34.8 G/DL (ref 31–36)
MCV RBC AUTO: 88 FL (ref 80–100)
MONOCYTES ABSOLUTE: 0.5 K/UL (ref 0–1.3)
MONOCYTES RELATIVE PERCENT: 7.5 %
NEUTROPHILS ABSOLUTE: 4.3 K/UL (ref 1.7–7.7)
NEUTROPHILS RELATIVE PERCENT: 61 %
PDW BLD-RTO: 13.7 % (ref 12.4–15.4)
PLATELET # BLD: 188 K/UL (ref 135–450)
PMV BLD AUTO: 8.7 FL (ref 5–10.5)
POTASSIUM REFLEX MAGNESIUM: 3.2 MMOL/L (ref 3.5–5.1)
RBC # BLD: 4.77 M/UL (ref 4–5.2)
SODIUM BLD-SCNC: 137 MMOL/L (ref 136–145)
TROPONIN: <0.01 NG/ML
WBC # BLD: 7.1 K/UL (ref 4–11)

## 2020-07-25 PROCEDURE — 2580000003 HC RX 258: Performed by: STUDENT IN AN ORGANIZED HEALTH CARE EDUCATION/TRAINING PROGRAM

## 2020-07-25 PROCEDURE — 1200000000 HC SEMI PRIVATE

## 2020-07-25 PROCEDURE — 93005 ELECTROCARDIOGRAM TRACING: CPT | Performed by: INTERNAL MEDICINE

## 2020-07-25 PROCEDURE — 2500000003 HC RX 250 WO HCPCS: Performed by: STUDENT IN AN ORGANIZED HEALTH CARE EDUCATION/TRAINING PROGRAM

## 2020-07-25 PROCEDURE — 99223 1ST HOSP IP/OBS HIGH 75: CPT | Performed by: INTERNAL MEDICINE

## 2020-07-25 PROCEDURE — 36415 COLL VENOUS BLD VENIPUNCTURE: CPT

## 2020-07-25 PROCEDURE — 6370000000 HC RX 637 (ALT 250 FOR IP): Performed by: STUDENT IN AN ORGANIZED HEALTH CARE EDUCATION/TRAINING PROGRAM

## 2020-07-25 PROCEDURE — U0003 INFECTIOUS AGENT DETECTION BY NUCLEIC ACID (DNA OR RNA); SEVERE ACUTE RESPIRATORY SYNDROME CORONAVIRUS 2 (SARS-COV-2) (CORONAVIRUS DISEASE [COVID-19]), AMPLIFIED PROBE TECHNIQUE, MAKING USE OF HIGH THROUGHPUT TECHNOLOGIES AS DESCRIBED BY CMS-2020-01-R: HCPCS

## 2020-07-25 PROCEDURE — 80048 BASIC METABOLIC PNL TOTAL CA: CPT

## 2020-07-25 PROCEDURE — 93010 ELECTROCARDIOGRAM REPORT: CPT | Performed by: INTERNAL MEDICINE

## 2020-07-25 PROCEDURE — 85025 COMPLETE CBC W/AUTO DIFF WBC: CPT

## 2020-07-25 PROCEDURE — 6360000002 HC RX W HCPCS: Performed by: STUDENT IN AN ORGANIZED HEALTH CARE EDUCATION/TRAINING PROGRAM

## 2020-07-25 PROCEDURE — 84484 ASSAY OF TROPONIN QUANT: CPT

## 2020-07-25 PROCEDURE — 83735 ASSAY OF MAGNESIUM: CPT

## 2020-07-25 RX ORDER — ASPIRIN 81 MG/1
81 TABLET ORAL DAILY
Status: DISCONTINUED | OUTPATIENT
Start: 2020-07-25 | End: 2020-07-28 | Stop reason: HOSPADM

## 2020-07-25 RX ORDER — AMLODIPINE BESYLATE 5 MG/1
5 TABLET ORAL DAILY
Status: DISCONTINUED | OUTPATIENT
Start: 2020-07-25 | End: 2020-07-28 | Stop reason: HOSPADM

## 2020-07-25 RX ORDER — ATORVASTATIN CALCIUM 40 MG/1
40 TABLET, FILM COATED ORAL NIGHTLY
Status: DISCONTINUED | OUTPATIENT
Start: 2020-07-25 | End: 2020-07-25

## 2020-07-25 RX ORDER — ATORVASTATIN CALCIUM 20 MG/1
20 TABLET, FILM COATED ORAL NIGHTLY
Status: DISCONTINUED | OUTPATIENT
Start: 2020-07-25 | End: 2020-07-28

## 2020-07-25 RX ORDER — ASPIRIN 81 MG/1
81 TABLET ORAL DAILY
Status: DISCONTINUED | OUTPATIENT
Start: 2020-07-25 | End: 2020-07-25

## 2020-07-25 RX ORDER — AMLODIPINE BESYLATE 5 MG/1
5 TABLET ORAL DAILY
Status: DISCONTINUED | OUTPATIENT
Start: 2020-07-25 | End: 2020-07-25

## 2020-07-25 RX ORDER — CHLORTHALIDONE 25 MG/1
50 TABLET ORAL DAILY
Status: DISCONTINUED | OUTPATIENT
Start: 2020-07-25 | End: 2020-07-25

## 2020-07-25 RX ADMIN — ASPIRIN 81 MG: 81 TABLET, COATED ORAL at 11:50

## 2020-07-25 RX ADMIN — POTASSIUM CHLORIDE 10 MEQ: 7.46 INJECTION, SOLUTION INTRAVENOUS at 10:12

## 2020-07-25 RX ADMIN — AMLODIPINE BESYLATE 5 MG: 5 TABLET ORAL at 11:50

## 2020-07-25 RX ADMIN — PROGESTERONE 200 MG: 100 CAPSULE ORAL at 00:23

## 2020-07-25 RX ADMIN — POTASSIUM CHLORIDE 10 MEQ: 7.46 INJECTION, SOLUTION INTRAVENOUS at 13:57

## 2020-07-25 RX ADMIN — TRAZODONE HYDROCHLORIDE 100 MG: 100 TABLET ORAL at 20:49

## 2020-07-25 RX ADMIN — FAMOTIDINE 20 MG: 10 INJECTION INTRAVENOUS at 08:31

## 2020-07-25 RX ADMIN — POTASSIUM CHLORIDE 10 MEQ: 7.46 INJECTION, SOLUTION INTRAVENOUS at 08:28

## 2020-07-25 RX ADMIN — Medication 10 ML: at 00:24

## 2020-07-25 RX ADMIN — LEVOTHYROXINE SODIUM 88 MCG: 0.09 TABLET ORAL at 06:17

## 2020-07-25 RX ADMIN — PROGESTERONE 200 MG: 100 CAPSULE ORAL at 20:48

## 2020-07-25 RX ADMIN — Medication 10 ML: at 22:31

## 2020-07-25 RX ADMIN — POTASSIUM CHLORIDE 10 MEQ: 7.46 INJECTION, SOLUTION INTRAVENOUS at 12:04

## 2020-07-25 ASSESSMENT — PAIN SCALES - GENERAL: PAINLEVEL_OUTOF10: 0

## 2020-07-25 NOTE — PROGRESS NOTES
Pt admitted to room 6324, awake and oriented x 4. RA resp easy. Denies any chest pain or SOB. Vital signs stable, afebrile.  NPO after midnight for stress test in AM.

## 2020-07-25 NOTE — CONSULTS
[facility]  Cardiology Inpatient Consult Service                                                                                          Pt Name: Gena Walker  Age: 56 y.o. Sex: female  : 1960  Location: [facility]    Referring Physician: Lurdes Spivey MD      Reason for Consult:       Reason for Consultation/Chief Complaint: Chest pain/hypertension      HPI:      Sherman Zapata is a 56 y.o. female with a past medical history of hypertension who presented to the hospital with chest pain. I saw her in the office on  with chest pain. At that time her EKG showed an old septal infarct and never complained of chest pain before. She was supposed to get a stress echo but is scheduled for August.  Now she comes with what it sounds typical angina. Describes the pain as moderate to severe precordial, pressure-like, radiates to her left arm, mild shortness of breath with pain, resolved by nitro. Histories     Past Medical History:   has a past medical history of Acquired hypothyroidism and Essential hypertension. Surgical History:   has a past surgical history that includes Dilation and curettage of uterus. Social History:   reports that she has never smoked. She has never used smokeless tobacco. She reports current alcohol use of about 1.0 standard drinks of alcohol per week. She reports that she does not use drugs. Family History:  No evidence for sudden cardiac death or premature CAD      Medications:       Home Medications  Were reviewed and are listed in nursing record. and/or listed below  Prior to Admission medications    Medication Sig Start Date End Date Taking?  Authorizing Provider   naproxen (NAPROSYN) 500 MG tablet Take 1 tablet by mouth 2 times daily (with meals) 20  Yes Mason Morgan MD   traZODone (DESYREL) 100 MG tablet Take 1 tablet by mouth nightly as needed for Sleep 3/13/20  Yes Travis Reich MD   metoprolol tartrate (LOPRESSOR) stools. GENITOURINARY: No dysuria, trouble voiding, or hematuria. MUSCULOSKELETAL:  No gait disturbance, weakness or joint complaints. NEUROLOGICAL: No headache, diplopia, change in muscle strength, numbness or tingling. No change in gait, balance, coordination, mood, affect, memory, mentation, behavior. PSHYCH: No anxiety, loss of interest, change in sexual behavior, feelings of self-harm, or confusion. ENDOCRINE: No malaise, fatigue or temperature intolerance. No excessive thirst, fluid intake, or urination. No tremor. HEMATOLOGIC: No abnormal bruising or bleeding. ALLERGY: No nasal congestion or hives.       Physical Examination:     Vitals:    07/24/20 2130 07/24/20 2230 07/24/20 2311 07/25/20 0823   BP: 122/70 119/81 116/76 (!) 151/78   Pulse: 66 62 60 67   Resp: 18 18 16 18   Temp:   97.9 °F (36.6 °C) 98 °F (36.7 °C)   TempSrc:   Oral Oral   SpO2: 99% 98% 96% 96%   Weight:       Height:           Wt Readings from Last 3 Encounters:   07/24/20 170 lb (77.1 kg)   06/29/20 174 lb 9.6 oz (79.2 kg)   05/05/20 175 lb (79.4 kg)       Objective      General Appearance:  Alert, cooperative, no distress, appears stated age Appropriate weight   Head:  Normocephalic, without obvious abnormality, atraumatic   Eyes:  PERRL, conjunctiva/corneas clear EOM intact  Ears normal   Throat no lesions       Nose: Nares normal, no drainage or sinus tenderness   Throat: Lips, mucosa, and tongue normal   Neck: Supple, symmetrical, trachea midline, no adenopathy, thyroid: not enlarged, symmetric, no tenderness/mass/nodules, no carotid bruit or JVD       Lungs:   Clear to auscultation bilaterally, respirations unlabored   Chest Wall:  No tenderness or deformity   Heart:  Regular rate and rhythm, S1, S2 normal, no murmur, rub or gallop PMI intact   Abdomen:   Soft, non-tender, bowel sounds active all four quadrants,  no masses, no organomegaly       Extremities: Extremities normal, atraumatic, no cyanosis or edema   Pulses: 2+ and evaluation will be based upon the patient's clinical course and testing results. I have spent 40 minutes of face to face time with the patient with more than 50% spent counseling and coordinating care. All questions and concerns were addressed to the patient/family. Alternatives to my treatment were discussed. The note was completed using EMR. Every effort was made to ensure accuracy; however, inadvertent computerized transcription errors may be present. I have personally reviewed the reports and images of labs, radiological studies, cardiac studies including ECG's and telemetry, current and old medical records. Torrey Moreno MD, [address] [facility]

## 2020-07-25 NOTE — ED PROVIDER NOTES
ED Attending Attestation Note     Date of evaluation: 7/24/2020    This patient was seen by the resident. I have seen and examined the patient, agree with the workup, evaluation, management and diagnosis. The care plan has been discussed. I have reviewed the ECG and concur with the resident's interpretation. My assessment reveals 57 y/o F with hx of HTN, obesity, abnormal EKG, and +family hx of CAD who presents for chest pain. Hypertensive. NAD, breath sounds clear, distal pulses intact, no leg swelling. Heart Score 5.   Admitted to hospitalist.     Adonay Carroll MD  07/24/20 3346

## 2020-07-25 NOTE — ED NOTES
Pt to floor room 8805 post floor Rn receiving report.      Diamante Gonsalves RN  07/24/20 Magnus Christopher, JOSEPH  07/24/20 9096

## 2020-07-25 NOTE — H&P
Internal Medicine  PGY 2  History & Physical      CC Angina     History Obtained From:  patient, electronic medical record    SUBJECTIVE      HISTORY OF PRESENT ILLNESS:   Mrs. Shamar Garrido is a 56 o female PMH HTN, Hypothyroidism, and arthritis who presents with chest pressure over the left side of her chest. It was constant, 5/10, non-radiating pain. This is her first episode of chest pain/pressure. The pressure was associated with nausea and headache. Sh endorses a family history of cardiac disease, her mother  from MI and her sister has had an MI. She ollows with [facility] and as scheduled for a stress test on 20. In the ED, Trop<0.01, EKG shows T wave flattening in III and V1. Her chest pain improved with nitro. Other notable labs include K 3.1 and glucose 185. Denies any fevers, chills, palpitations, leg swelling, cough, shortness of breath, difficulty breathing, wheezing, abdominal pain, vomiting, diarrhea.     Past Medical History:        Diagnosis Date    Acquired hypothyroidism 2019    Essential hypertension 2019       Past Surgical History:        Procedure Laterality Date    DILATION AND CURETTAGE OF UTERUS         Medications Priorto Admission:    Medications Prior to Admission: naproxen (NAPROSYN) 500 MG tablet, Take 1 tablet by mouth 2 times daily (with meals)  traZODone (DESYREL) 100 MG tablet, Take 1 tablet by mouth nightly as needed for Sleep  metoprolol tartrate (LOPRESSOR) 25 MG tablet, Take 25 mg by mouth daily  Magnesium Gluconate 550 MG TABS, Take 30 mg by mouth 2 times daily  levothyroxine (SYNTHROID) 88 MCG tablet, Take 88 mcg by mouth daily  progesterone (PROMETRIUM) 200 MG capsule, Take 200 mg by mouth nightly  diclofenac (SOLARAZE) 3 % gel, Apply 3 % topically 2 times daily  chlorthalidone (HYGROTEN) 50 MG tablet, Take 1 tablet by mouth daily  traZODone (DESYREL) 50 MG tablet, Take 0.5 tablets by mouth nightly    Allergies:  Patient has no known Status: She is alert and oriented to person, place, and time. Mental status is at baseline. Cranial Nerves: No cranial nerve deficit. Motor: No weakness. Gait: Gait normal.   Psychiatric:         Mood and Affect: Mood normal.         Behavior: Behavior normal.         Thought Content: Thought content normal.         Judgment: Judgment normal.            Vitals:    07/24/20 2311   BP: 116/76   Pulse: 60   Resp: 16   Temp: 97.9 °F (36.6 °C)   SpO2: 96%       OBJECTIVE    Labs:  CBC:   Recent Labs     07/24/20 2007   WBC 8.5   HGB 14.9   HCT 43.3          BMP:   Recent Labs     07/24/20 2007      K 3.1*   CL 93*   CO2 30   BUN 11   CREATININE 0.8   GLUCOSE 185*     LFT's: No results for input(s): AST, ALT, ALB, BILITOT, ALKPHOS in the last 72 hours. Troponin:   Recent Labs     07/24/20 2007   TROPONINI <0.01     BNP:No results for input(s): BNP in the last 72 hours. ABGs: No results for input(s): PHART, BLV0RUZ, PO2ART in the last 72 hours. INR: No results for input(s): INR in the last 72 hours. U/A:No results for input(s): NITRITE, COLORU, PHUR, LABCAST, WBCUA, RBCUA, MUCUS, TRICHOMONAS, YEAST, BACTERIA, CLARITYU, SPECGRAV, LEUKOCYTESUR, UROBILINOGEN, BILIRUBINUR, BLOODU, GLUCOSEU, AMORPHOUS in the last 72 hours. Invalid input(s): KETONESU    XR CHEST (2 VW)   Final Result      No acute pulmonary disease. ASSESSMENT AND PLAN:    Anginal Chest Pain suspect Cardiac in Origin  - Stress Echo on treadmill  - Nitro PRN  - tele  - hold lai eBB in anticipation of stress  Andrew Batch consulted  - NPO after midnight in preparation of possible stress.      Chronic Medical Problems  Hypothyroid - Synthroid 88mcg  GERD - Pepcid 20mg BID  HTN -  Hygoton 50mg daily, hold beta blocker  Hot Flashes - Progesterone 200 HS      Code Status:Full Code  FEN: Diet NPO, After Midnight  PPX:  Lovenox  DISPO: GMF    Will discuss with attending physician Dr. Tyler Manuel,

## 2020-07-25 NOTE — PLAN OF CARE
Problem: Pain:  Goal: Pain level will decrease  Description: Pain level will decrease  Outcome: Ongoing   Pt denies any chest pain. Will continue to monitor.

## 2020-07-25 NOTE — PLAN OF CARE
Problem: Pain:  Goal: Pain level will decrease  Description: Pain level will decrease  7/25/2020 1236 by Binu Chavez RN  Note: Pt denies c/o pain this shift. Will monitor. Pt has prn nitro for chest pain if needed.

## 2020-07-25 NOTE — PROGRESS NOTES
Progress Note    Admit Date: 7/24/2020  Diet: DIET CARDIAC;    CC:   Chief Complaint   Patient presents with    Chest Pain     pressure to mid left chest since last night, -SOB, +nausea,          Interval history: Pt feels well this AM, denies CP, SOB. Reports las time she had chest pain was in ED and that resolved with nitro administartion. Denies any fevers, chills, palpitations, leg swelling, cough,wheezing, abdominal pain, vomiting, diarrhea. Medications:     Scheduled Meds:   potassium bicarb-citric acid  40 mEq Oral Q4H    aspirin  81 mg Oral Daily    amLODIPine  5 mg Oral Daily    atorvastatin  20 mg Oral Nightly    levothyroxine  88 mcg Oral QAM AC    progesterone  200 mg Oral Nightly    sodium chloride flush  10 mL Intravenous 2 times per day    enoxaparin  40 mg Subcutaneous Daily    famotidine (PEPCID) injection  20 mg Intravenous BID     Continuous Infusions:  PRN Meds:nitroGLYCERIN, traZODone, sodium chloride flush, acetaminophen **OR** acetaminophen, polyethylene glycol, promethazine **OR** ondansetron, potassium chloride, magnesium sulfate, perflutren lipid microspheres    Objective:   Vitals:   T-max:  Patient Vitals for the past 8 hrs:   BP Temp Temp src Pulse Resp SpO2   07/25/20 1149 (!) 145/75 98.1 °F (36.7 °C) Oral 63 18 98 %   07/25/20 0823 (!) 151/78 98 °F (36.7 °C) Oral 67 18 96 %       Intake/Output Summary (Last 24 hours) at 7/25/2020 1220  Last data filed at 7/25/2020 1033  Gross per 24 hour   Intake --   Output 800 ml   Net -800 ml       Review of Systems  As Per HPI and Interval History. Physical Exam  Constitutional:       General: She is not in acute distress. HENT:      Mouth/Throat:      Mouth: Mucous membranes are moist.   Eyes:      Extraocular Movements: Extraocular movements intact. Pupils: Pupils are equal, round, and reactive to light. Cardiovascular:      Rate and Rhythm: Normal rate and regular rhythm. Heart sounds: No murmur.    Pulmonary: Effort: Pulmonary effort is normal.      Breath sounds: No wheezing or rales. Abdominal:      Palpations: Abdomen is soft. Tenderness: There is no abdominal tenderness. Musculoskeletal: Normal range of motion. Right lower leg: No edema. Left lower leg: No edema. Skin:     General: Skin is warm. Neurological:      General: No focal deficit present. Mental Status: She is alert and oriented to person, place, and time. Cranial Nerves: No cranial nerve deficit. Sensory: No sensory deficit. Motor: No weakness. LABS:    CBC:   Recent Labs     07/24/20 2007 07/25/20 0617   WBC 8.5 7.1   HGB 14.9 14.6   HCT 43.3 42.0    188   MCV 89.6 88.0     Renal:    Recent Labs     07/24/20 2007 07/25/20 0617    137   K 3.1* 3.2*   CL 93* 96*   CO2 30 31   BUN 11 10   CREATININE 0.8 0.7   GLUCOSE 185* 109*   CALCIUM 10.0 9.8   MG 2.10 2.10   ANIONGAP 13 10     Troponin:   Recent Labs     07/24/20 2007 07/25/20  0949   TROPONINI <0.01 <0.01     Cultures:  -----------------------------------------------------------------  RAD:   XR CHEST (2 VW)   Final Result      No acute pulmonary disease. NM MYOCARDIAL SPECT REST EXERCISE OR RX    (Results Pending)       Assessment/Plan:     Chest pain  -Nitro PRN, telemetry  -Cardiology consult: Plan for cath on Monday. Continue B-Blocker. Start aspirin 81 mg daily, atorvastatin. Statin 20 mgand start amlodipine 5 mg. Hold chlorthalidone given hypokalemia.   -Cardiac diet. F/u A1c, Lipid panel. Hyperglycemia  -No hx of DM.   -fu A1c. Hypothyroidism  -Continue home synthroid    HLD  -Not on statin in past. Has history of elevated CK. Last  in 12/2019.   -Will start on low dose statin and will check CK on Monday. Morbid obesity due to excess calories Body mass index is 31.09 kg/m². - Complicating assessment and treatment.  Placing patient at risk for multiple co-morbidities as well as early death and contributing to the patient's presentation.  on weight loss when appropriate. HTN -  BB, amlodipine.        Hypothyroidism Continue home synthroid    Code Status:Full Code  FEN: Cardiac  PPX:  Lovenox  DISPO: Cheryl Alan MD, PGY-2  07/25/20  12:20 PM    This patient has been staffed and discussed with Bandar Bauer MD.

## 2020-07-26 LAB
ANION GAP SERPL CALCULATED.3IONS-SCNC: 12 MMOL/L (ref 3–16)
BASOPHILS ABSOLUTE: 0 K/UL (ref 0–0.2)
BASOPHILS RELATIVE PERCENT: 0.1 %
BUN BLDV-MCNC: 17 MG/DL (ref 7–20)
CALCIUM SERPL-MCNC: 10 MG/DL (ref 8.3–10.6)
CHLORIDE BLD-SCNC: 98 MMOL/L (ref 99–110)
CO2: 30 MMOL/L (ref 21–32)
CREAT SERPL-MCNC: 0.7 MG/DL (ref 0.6–1.2)
EOSINOPHILS ABSOLUTE: 0 K/UL (ref 0–0.6)
EOSINOPHILS RELATIVE PERCENT: 0.3 %
GFR AFRICAN AMERICAN: >60
GFR NON-AFRICAN AMERICAN: >60
GLUCOSE BLD-MCNC: 121 MG/DL (ref 70–99)
HCT VFR BLD CALC: 44.2 % (ref 36–48)
HEMOGLOBIN: 15.1 G/DL (ref 12–16)
LYMPHOCYTES ABSOLUTE: 1.6 K/UL (ref 1–5.1)
LYMPHOCYTES RELATIVE PERCENT: 20.5 %
MAGNESIUM: 1.9 MG/DL (ref 1.8–2.4)
MCH RBC QN AUTO: 30.4 PG (ref 26–34)
MCHC RBC AUTO-ENTMCNC: 34.3 G/DL (ref 31–36)
MCV RBC AUTO: 88.6 FL (ref 80–100)
MONOCYTES ABSOLUTE: 0.5 K/UL (ref 0–1.3)
MONOCYTES RELATIVE PERCENT: 6.5 %
NEUTROPHILS ABSOLUTE: 5.8 K/UL (ref 1.7–7.7)
NEUTROPHILS RELATIVE PERCENT: 72.6 %
PDW BLD-RTO: 13.9 % (ref 12.4–15.4)
PLATELET # BLD: 192 K/UL (ref 135–450)
PMV BLD AUTO: 9 FL (ref 5–10.5)
POTASSIUM REFLEX MAGNESIUM: 3.3 MMOL/L (ref 3.5–5.1)
RBC # BLD: 4.98 M/UL (ref 4–5.2)
SODIUM BLD-SCNC: 140 MMOL/L (ref 136–145)
WBC # BLD: 8 K/UL (ref 4–11)

## 2020-07-26 PROCEDURE — 80048 BASIC METABOLIC PNL TOTAL CA: CPT

## 2020-07-26 PROCEDURE — 6370000000 HC RX 637 (ALT 250 FOR IP): Performed by: STUDENT IN AN ORGANIZED HEALTH CARE EDUCATION/TRAINING PROGRAM

## 2020-07-26 PROCEDURE — 99233 SBSQ HOSP IP/OBS HIGH 50: CPT | Performed by: INTERNAL MEDICINE

## 2020-07-26 PROCEDURE — 36415 COLL VENOUS BLD VENIPUNCTURE: CPT

## 2020-07-26 PROCEDURE — 6360000002 HC RX W HCPCS: Performed by: STUDENT IN AN ORGANIZED HEALTH CARE EDUCATION/TRAINING PROGRAM

## 2020-07-26 PROCEDURE — 2580000003 HC RX 258: Performed by: STUDENT IN AN ORGANIZED HEALTH CARE EDUCATION/TRAINING PROGRAM

## 2020-07-26 PROCEDURE — 83735 ASSAY OF MAGNESIUM: CPT

## 2020-07-26 PROCEDURE — 1200000000 HC SEMI PRIVATE

## 2020-07-26 PROCEDURE — 85025 COMPLETE CBC W/AUTO DIFF WBC: CPT

## 2020-07-26 RX ORDER — POTASSIUM CHLORIDE 7.45 MG/ML
20 INJECTION INTRAVENOUS ONCE
Status: DISCONTINUED | OUTPATIENT
Start: 2020-07-26 | End: 2020-07-26

## 2020-07-26 RX ORDER — LANOLIN ALCOHOL/MO/W.PET/CERES
400 CREAM (GRAM) TOPICAL ONCE
Status: COMPLETED | OUTPATIENT
Start: 2020-07-26 | End: 2020-07-26

## 2020-07-26 RX ORDER — MAGNESIUM SULFATE IN WATER 40 MG/ML
2 INJECTION, SOLUTION INTRAVENOUS ONCE
Status: DISCONTINUED | OUTPATIENT
Start: 2020-07-26 | End: 2020-07-26

## 2020-07-26 RX ORDER — POTASSIUM CHLORIDE 20 MEQ/1
40 TABLET, EXTENDED RELEASE ORAL ONCE
Status: DISCONTINUED | OUTPATIENT
Start: 2020-07-26 | End: 2020-07-26

## 2020-07-26 RX ORDER — POTASSIUM CHLORIDE 20 MEQ/1
40 TABLET, EXTENDED RELEASE ORAL ONCE
Status: COMPLETED | OUTPATIENT
Start: 2020-07-26 | End: 2020-07-26

## 2020-07-26 RX ORDER — POTASSIUM CHLORIDE 7.45 MG/ML
10 INJECTION INTRAVENOUS
Status: COMPLETED | OUTPATIENT
Start: 2020-07-26 | End: 2020-07-26

## 2020-07-26 RX ORDER — METOPROLOL SUCCINATE 25 MG/1
12.5 TABLET, EXTENDED RELEASE ORAL DAILY
Status: DISCONTINUED | OUTPATIENT
Start: 2020-07-26 | End: 2020-07-28 | Stop reason: HOSPADM

## 2020-07-26 RX ORDER — SODIUM CHLORIDE 9 MG/ML
INJECTION, SOLUTION INTRAVENOUS CONTINUOUS
Status: ACTIVE | OUTPATIENT
Start: 2020-07-27 | End: 2020-07-27

## 2020-07-26 RX ADMIN — POTASSIUM CHLORIDE 40 MEQ: 20 TABLET, EXTENDED RELEASE ORAL at 10:42

## 2020-07-26 RX ADMIN — METOPROLOL SUCCINATE 12.5 MG: 25 TABLET, EXTENDED RELEASE ORAL at 11:49

## 2020-07-26 RX ADMIN — TRAZODONE HYDROCHLORIDE 100 MG: 100 TABLET ORAL at 21:42

## 2020-07-26 RX ADMIN — AMLODIPINE BESYLATE 5 MG: 5 TABLET ORAL at 08:28

## 2020-07-26 RX ADMIN — Medication 400 MG: at 10:42

## 2020-07-26 RX ADMIN — Medication 10 ML: at 08:28

## 2020-07-26 RX ADMIN — LEVOTHYROXINE SODIUM 88 MCG: 0.09 TABLET ORAL at 08:31

## 2020-07-26 RX ADMIN — PROGESTERONE 200 MG: 100 CAPSULE ORAL at 21:42

## 2020-07-26 RX ADMIN — Medication 10 ML: at 21:42

## 2020-07-26 RX ADMIN — POTASSIUM CHLORIDE 10 MEQ: 7.46 INJECTION, SOLUTION INTRAVENOUS at 12:38

## 2020-07-26 RX ADMIN — ASPIRIN 81 MG: 81 TABLET, COATED ORAL at 08:28

## 2020-07-26 RX ADMIN — POTASSIUM CHLORIDE 10 MEQ: 7.46 INJECTION, SOLUTION INTRAVENOUS at 10:42

## 2020-07-26 NOTE — PROGRESS NOTES
equal, round, and reactive to light. Cardiovascular:      Rate and Rhythm: Normal rate and regular rhythm. Heart sounds: No murmur. Pulmonary:      Effort: Pulmonary effort is normal.      Breath sounds: No wheezing or rales. Abdominal:      Palpations: Abdomen is soft. Tenderness: There is no abdominal tenderness. Musculoskeletal: Normal range of motion. Right lower leg: No edema. Left lower leg: No edema. Skin:     General: Skin is warm. Neurological:      General: No focal deficit present. Mental Status: She is alert and oriented to person, place, and time. Cranial Nerves: No cranial nerve deficit. Sensory: No sensory deficit. Motor: No weakness. LABS:    CBC:   Recent Labs     07/24/20 2007 07/25/20 0617 07/26/20  0826   WBC 8.5 7.1 8.0   HGB 14.9 14.6 15.1   HCT 43.3 42.0 44.2    188 192   MCV 89.6 88.0 88.6     Renal:    Recent Labs     07/24/20 2007 07/25/20 0617 07/26/20  0826    137 140   K 3.1* 3.2* 3.3*   CL 93* 96* 98*   CO2 30 31 30   BUN 11 10 17   CREATININE 0.8 0.7 0.7   GLUCOSE 185* 109* 121*   CALCIUM 10.0 9.8 10.0   MG 2.10 2.10 1.90   ANIONGAP 13 10 12     Troponin:   Recent Labs     07/24/20 2007 07/25/20  0949   TROPONINI <0.01 <0.01     Cultures:  -----------------------------------------------------------------  RAD:   XR CHEST (2 VW)   Final Result      No acute pulmonary disease. NM MYOCARDIAL SPECT REST EXERCISE OR RX    (Results Pending)       Assessment/Plan:     Chest pain  - Nitro PRN, telemetry  - On aspirin 81 mg daily, atorvastatin. Statin 20 mg and amlodipine 5 mg.   - Hold chlorthalidone given hypokalemia. - Cardiology consult: Plan for cath on Monday. NPO after midnight     Hypothyroidism  - Continue home synthroid  - TSH tomorrow AM    HLD  - Not on statin in past. Has history of elevated CK. Last  in 12/2019.   - Will start on low dose statin and will check CK on Monday.      Morbid obesity due to excess calories Body mass index is 31.09 kg/m². - Complicating assessment and treatment. Placing patient at risk for multiple co-morbidities as well as early death and contributing to the patient's presentation.  on weight loss when appropriate. HTN -  BB, amlodipine.      Code Status: Full Code  FEN: Cardiac  PPX:  Lovenox  DISPO: [facility]    Melville Claude, MD, PGY-1  07/26/20  11:14 AM    This patient has been staffed and discussed with Charis Bailey MD.

## 2020-07-26 NOTE — PLAN OF CARE
Problem: Pain:  Goal: Pain level will decrease  Description: Pain level will decrease  7/26/2020 1613 by Alejandra Loera RN  Outcome: Ongoing  Note: Pt has had no c/o pain this shift. Pt waiting on angiogram tomorrow for c/o chest pain on admission relieved with nitro. Will monitor.

## 2020-07-26 NOTE — PROGRESS NOTES
normal  Cardiovascular:  RRR S1S2  Abdomen:  Soft -  Extremities:  - edema      Objective    Labs:   Recent Labs     07/24/20 2007 07/25/20 0617 07/26/20  0826    137 140   K 3.1* 3.2* 3.3*   BUN 11 10 17   CREATININE 0.8 0.7 0.7   CL 93* 96* 98*   CO2 30 31 30   GLUCOSE 185* 109* 121*   CALCIUM 10.0 9.8 10.0   MG 2.10 2.10 1.90     Recent Labs     07/24/20 2007 07/25/20 0617 07/26/20  0826   WBC 8.5 7.1 8.0   HGB 14.9 14.6 15.1   HCT 43.3 42.0 44.2    188 192   MCV 89.6 88.0 88.6     No results for input(s): CHOLTOT, TRIG, HDL in the last 72 hours. Invalid input(s): LIPIDCOMM, CHOLHDL, VLDCHOL, LDL  No results for input(s): PTT, INR in the last 72 hours. Invalid input(s): PT  Recent Labs     07/24/20 2007 07/25/20  0949   TROPONINI <0.01 <0.01     No results for input(s): BNP in the last 72 hours. No results for input(s): NTPROBNP in the last 72 hours. No results for input(s): TSH in the last 72 hours. Imaging:   I personally reviewed imaging studies including CXR, Stress test, TTE/JUDY. Assessment & Plan:     Chest pain/angina/hypertension  -Continue amlodipine. Replace potassium as needed.  -N.p.o. after midnight for cath tomorrow once COVID testing is back.  -Continue aspirin and statin. Thank you for allowing to us to participate in the care of Yuri Petersenrp 9. Please call our service with questions. All questions and concerns were addressed to the patient/family. Alternatives to my treatment were discussed. The note was completed using EMR. Every effort was made to ensure accuracy; however, inadvertent computerized transcription errors may be present. I have personally reviewed the reports and images of labs, radiological studies, cardiac studies including ECG's and telemetry, current and old medical records. Torrey Cross MD, [facility], [facility]    7/26/2020 11:10 AM

## 2020-07-27 LAB
ANION GAP SERPL CALCULATED.3IONS-SCNC: 12 MMOL/L (ref 3–16)
BASOPHILS ABSOLUTE: 0 K/UL (ref 0–0.2)
BASOPHILS RELATIVE PERCENT: 0.1 %
BUN BLDV-MCNC: 17 MG/DL (ref 7–20)
CALCIUM SERPL-MCNC: 9.4 MG/DL (ref 8.3–10.6)
CHLORIDE BLD-SCNC: 100 MMOL/L (ref 99–110)
CHOLESTEROL, TOTAL: 157 MG/DL (ref 0–199)
CO2: 29 MMOL/L (ref 21–32)
CREAT SERPL-MCNC: 0.8 MG/DL (ref 0.6–1.2)
EOSINOPHILS ABSOLUTE: 0 K/UL (ref 0–0.6)
EOSINOPHILS RELATIVE PERCENT: 0.6 %
GFR AFRICAN AMERICAN: >60
GFR NON-AFRICAN AMERICAN: >60
GLUCOSE BLD-MCNC: 104 MG/DL (ref 70–99)
HCT VFR BLD CALC: 42.1 % (ref 36–48)
HDLC SERPL-MCNC: 43 MG/DL (ref 40–60)
HEMOGLOBIN: 14.4 G/DL (ref 12–16)
LDL CHOLESTEROL CALCULATED: 82 MG/DL
LEFT VENTRICULAR EJECTION FRACTION MODE: NORMAL
LV EF: 60 %
LYMPHOCYTES ABSOLUTE: 2.1 K/UL (ref 1–5.1)
LYMPHOCYTES RELATIVE PERCENT: 32.7 %
MCH RBC QN AUTO: 30.6 PG (ref 26–34)
MCHC RBC AUTO-ENTMCNC: 34.1 G/DL (ref 31–36)
MCV RBC AUTO: 89.9 FL (ref 80–100)
MONOCYTES ABSOLUTE: 0.5 K/UL (ref 0–1.3)
MONOCYTES RELATIVE PERCENT: 7.5 %
NEUTROPHILS ABSOLUTE: 3.8 K/UL (ref 1.7–7.7)
NEUTROPHILS RELATIVE PERCENT: 59.1 %
PDW BLD-RTO: 13.5 % (ref 12.4–15.4)
PLATELET # BLD: 188 K/UL (ref 135–450)
PMV BLD AUTO: 8.6 FL (ref 5–10.5)
POC ACT LR: 250 SEC
POTASSIUM REFLEX MAGNESIUM: 3.7 MMOL/L (ref 3.5–5.1)
RBC # BLD: 4.69 M/UL (ref 4–5.2)
SODIUM BLD-SCNC: 141 MMOL/L (ref 136–145)
TOTAL CK: 109 U/L (ref 26–192)
TRIGL SERPL-MCNC: 160 MG/DL (ref 0–150)
TSH REFLEX: 2.38 UIU/ML (ref 0.27–4.2)
VLDLC SERPL CALC-MCNC: 32 MG/DL
WBC # BLD: 6.4 K/UL (ref 4–11)

## 2020-07-27 PROCEDURE — 1200000000 HC SEMI PRIVATE

## 2020-07-27 PROCEDURE — 93458 L HRT ARTERY/VENTRICLE ANGIO: CPT | Performed by: INTERNAL MEDICINE

## 2020-07-27 PROCEDURE — C1769 GUIDE WIRE: HCPCS

## 2020-07-27 PROCEDURE — 2580000003 HC RX 258: Performed by: STUDENT IN AN ORGANIZED HEALTH CARE EDUCATION/TRAINING PROGRAM

## 2020-07-27 PROCEDURE — 99152 MOD SED SAME PHYS/QHP 5/>YRS: CPT

## 2020-07-27 PROCEDURE — 2709999900 HC NON-CHARGEABLE SUPPLY

## 2020-07-27 PROCEDURE — 93572 IV DOP VEL&/PRESS C FLO EA: CPT

## 2020-07-27 PROCEDURE — 93571 IV DOP VEL&/PRESS C FLO 1ST: CPT

## 2020-07-27 PROCEDURE — C1887 CATHETER, GUIDING: HCPCS

## 2020-07-27 PROCEDURE — 2500000003 HC RX 250 WO HCPCS

## 2020-07-27 PROCEDURE — 85347 COAGULATION TIME ACTIVATED: CPT

## 2020-07-27 PROCEDURE — B2151ZZ FLUOROSCOPY OF LEFT HEART USING LOW OSMOLAR CONTRAST: ICD-10-PCS | Performed by: INTERNAL MEDICINE

## 2020-07-27 PROCEDURE — 85025 COMPLETE CBC W/AUTO DIFF WBC: CPT

## 2020-07-27 PROCEDURE — 99153 MOD SED SAME PHYS/QHP EA: CPT

## 2020-07-27 PROCEDURE — B2111ZZ FLUOROSCOPY OF MULTIPLE CORONARY ARTERIES USING LOW OSMOLAR CONTRAST: ICD-10-PCS | Performed by: INTERNAL MEDICINE

## 2020-07-27 PROCEDURE — 80061 LIPID PANEL: CPT

## 2020-07-27 PROCEDURE — 82550 ASSAY OF CK (CPK): CPT

## 2020-07-27 PROCEDURE — 93458 L HRT ARTERY/VENTRICLE ANGIO: CPT

## 2020-07-27 PROCEDURE — 84443 ASSAY THYROID STIM HORMONE: CPT

## 2020-07-27 PROCEDURE — 93571 IV DOP VEL&/PRESS C FLO 1ST: CPT | Performed by: INTERNAL MEDICINE

## 2020-07-27 PROCEDURE — 80048 BASIC METABOLIC PNL TOTAL CA: CPT

## 2020-07-27 PROCEDURE — 6370000000 HC RX 637 (ALT 250 FOR IP): Performed by: STUDENT IN AN ORGANIZED HEALTH CARE EDUCATION/TRAINING PROGRAM

## 2020-07-27 PROCEDURE — 6360000004 HC RX CONTRAST MEDICATION: Performed by: INTERNAL MEDICINE

## 2020-07-27 PROCEDURE — 4A023N7 MEASUREMENT OF CARDIAC SAMPLING AND PRESSURE, LEFT HEART, PERCUTANEOUS APPROACH: ICD-10-PCS | Performed by: INTERNAL MEDICINE

## 2020-07-27 PROCEDURE — 2580000003 HC RX 258: Performed by: INTERNAL MEDICINE

## 2020-07-27 PROCEDURE — 36415 COLL VENOUS BLD VENIPUNCTURE: CPT

## 2020-07-27 PROCEDURE — 6360000002 HC RX W HCPCS

## 2020-07-27 PROCEDURE — 6360000002 HC RX W HCPCS: Performed by: STUDENT IN AN ORGANIZED HEALTH CARE EDUCATION/TRAINING PROGRAM

## 2020-07-27 PROCEDURE — C1894 INTRO/SHEATH, NON-LASER: HCPCS

## 2020-07-27 PROCEDURE — 93572 IV DOP VEL&/PRESS C FLO EA: CPT | Performed by: INTERNAL MEDICINE

## 2020-07-27 RX ORDER — SODIUM CHLORIDE 0.9 % (FLUSH) 0.9 %
10 SYRINGE (ML) INJECTION EVERY 12 HOURS SCHEDULED
Status: DISCONTINUED | OUTPATIENT
Start: 2020-07-27 | End: 2020-07-28 | Stop reason: HOSPADM

## 2020-07-27 RX ORDER — AMLODIPINE BESYLATE 5 MG/1
5 TABLET ORAL DAILY
Qty: 30 TABLET | Refills: 3 | Status: SHIPPED | OUTPATIENT
Start: 2020-07-28 | End: 2020-10-30 | Stop reason: SDUPTHER

## 2020-07-27 RX ORDER — ACETAMINOPHEN 325 MG/1
650 TABLET ORAL EVERY 4 HOURS PRN
Status: DISCONTINUED | OUTPATIENT
Start: 2020-07-27 | End: 2020-07-28 | Stop reason: HOSPADM

## 2020-07-27 RX ORDER — ASPIRIN 81 MG/1
81 TABLET ORAL DAILY
Qty: 30 TABLET | Refills: 3 | Status: SHIPPED | OUTPATIENT
Start: 2020-07-28

## 2020-07-27 RX ORDER — ATROPINE SULFATE 0.4 MG/ML
0.5 AMPUL (ML) INJECTION
Status: ACTIVE | OUTPATIENT
Start: 2020-07-27 | End: 2020-07-27

## 2020-07-27 RX ORDER — POTASSIUM CHLORIDE 20 MEQ/1
40 TABLET, EXTENDED RELEASE ORAL ONCE
Status: COMPLETED | OUTPATIENT
Start: 2020-07-27 | End: 2020-07-27

## 2020-07-27 RX ORDER — ATORVASTATIN CALCIUM 20 MG/1
40 TABLET, FILM COATED ORAL NIGHTLY
Qty: 30 TABLET | Refills: 3 | Status: SHIPPED
Start: 2020-07-27 | End: 2020-07-28 | Stop reason: HOSPADM

## 2020-07-27 RX ORDER — SODIUM CHLORIDE 9 MG/ML
INJECTION, SOLUTION INTRAVENOUS CONTINUOUS
Status: ACTIVE | OUTPATIENT
Start: 2020-07-27 | End: 2020-07-27

## 2020-07-27 RX ORDER — SODIUM CHLORIDE 0.9 % (FLUSH) 0.9 %
10 SYRINGE (ML) INJECTION PRN
Status: DISCONTINUED | OUTPATIENT
Start: 2020-07-27 | End: 2020-07-28 | Stop reason: HOSPADM

## 2020-07-27 RX ORDER — LANOLIN ALCOHOL/MO/W.PET/CERES
400 CREAM (GRAM) TOPICAL ONCE
Status: COMPLETED | OUTPATIENT
Start: 2020-07-27 | End: 2020-07-27

## 2020-07-27 RX ORDER — ONDANSETRON 2 MG/ML
4 INJECTION INTRAMUSCULAR; INTRAVENOUS EVERY 6 HOURS PRN
Status: DISCONTINUED | OUTPATIENT
Start: 2020-07-27 | End: 2020-07-28 | Stop reason: HOSPADM

## 2020-07-27 RX ORDER — NITROGLYCERIN 0.4 MG/1
TABLET SUBLINGUAL
Qty: 25 TABLET | Refills: 3 | Status: SHIPPED | OUTPATIENT
Start: 2020-07-27 | End: 2022-02-15

## 2020-07-27 RX ORDER — METOPROLOL SUCCINATE 25 MG/1
12.5 TABLET, EXTENDED RELEASE ORAL DAILY
Qty: 30 TABLET | Refills: 3 | Status: SHIPPED | OUTPATIENT
Start: 2020-07-28 | End: 2021-03-24 | Stop reason: SDUPTHER

## 2020-07-27 RX ADMIN — METOPROLOL SUCCINATE 12.5 MG: 25 TABLET, EXTENDED RELEASE ORAL at 09:17

## 2020-07-27 RX ADMIN — SODIUM CHLORIDE: 9 INJECTION, SOLUTION INTRAVENOUS at 19:58

## 2020-07-27 RX ADMIN — Medication 400 MG: at 07:28

## 2020-07-27 RX ADMIN — LEVOTHYROXINE SODIUM 88 MCG: 0.09 TABLET ORAL at 06:22

## 2020-07-27 RX ADMIN — POTASSIUM CHLORIDE 40 MEQ: 20 TABLET, EXTENDED RELEASE ORAL at 07:28

## 2020-07-27 RX ADMIN — SODIUM CHLORIDE: 9 INJECTION, SOLUTION INTRAVENOUS at 06:21

## 2020-07-27 RX ADMIN — AMLODIPINE BESYLATE 5 MG: 5 TABLET ORAL at 09:17

## 2020-07-27 RX ADMIN — ASPIRIN 81 MG: 81 TABLET, COATED ORAL at 09:16

## 2020-07-27 RX ADMIN — PROGESTERONE 200 MG: 100 CAPSULE ORAL at 21:17

## 2020-07-27 RX ADMIN — TRAZODONE HYDROCHLORIDE 100 MG: 100 TABLET ORAL at 21:17

## 2020-07-27 RX ADMIN — IOHEXOL 190 ML: 350 INJECTION, SOLUTION INTRAVENOUS at 16:21

## 2020-07-27 RX ADMIN — Medication 10 ML: at 09:23

## 2020-07-27 ASSESSMENT — PAIN SCALES - GENERAL
PAINLEVEL_OUTOF10: 0

## 2020-07-27 NOTE — PROCEDURES
[address], [address]                            CARDIAC CATHETERIZATION    PATIENT NAME: Albertina Amos                      :        1960  MED REC NO:   [id number]                          ROOM:       6324  ACCOUNT NO:   [de-identified]                           ADMIT DATE: 2020  PROVIDER:     Diana Sims. Bisi Andrew MD    DATE OF PROCEDURE:  2020    REASON FOR THE CATH:  The patient has had significant intermittent chest  pressure. She has an abnormal EKG with Q-waves in leads V3, V4. She  presents today for risk stratification with coronary angiography. DESCRIPTION OF PROCEDURE:  Prepped and draped in a sterile manner,  locally anesthetized with 2% lidocaine, right femoral artery area. A  5-Khmer cath was placed in a retrograde manner, right femoral artery  over a guidewire. Diania Friday, and pigtail catheters were used during  diagnostic procedure. All were aspirated and flushed prior to use. All  catheters were advanced under fluoroscopic guidance over the guidewire  and retracted over the guidewire. The start time of the procedure was 1540 and the end time was 1616. Please note the Mallampati score was 2. The ASA score was 2. FINDINGS:  Left main coronary artery is normal.  The proximal circumflex  is normal.  There is a first high obtuse marginal branch that may be a  ramus branch that is angiographically normal.  Second obtuse marginal  branch is normal.  There are minor irregularities in the circumflex. There is a 20% to 30% narrowing in the mid circumflex. The remainder of  the artery is normal.  Obtuse marginal branches 3, 4, and 5 were all  normal.  There are many branches at the bottom of the heart from the  circumflex as well. The LAD is angiographically unremarkable in its proximal aspect. In the  mid aspect, there is an LAD stenosis of about 20%.   The remainder of the  LAD is Hydration. Bedrest.  I would treat her aggressively for coronary  artery disease. I would either consider using thienopyridine medication  to inhibit the platelet function for a while. Please note that the estimated blood loss was less than 15 mL. Leo Green MD    D: 07/27/2020 16:46:38       T: 07/27/2020 17:35:54     [email or web address]  Job#: [id number]     Doc#: [id number]    CC:  Dioni Anderson. MD Luz Huston MD Kirsten Forward.   Marla Joseph

## 2020-07-27 NOTE — PLAN OF CARE
Problem: Pain:  Goal: Pain level will decrease  Description: Pain level will decrease  Note: Patient has no complaints of pain voiced at this time. Will continue to monitor and reassess.

## 2020-07-27 NOTE — CARE COORDINATION
Case Management Assessment           Daily Note                 Date/ Time of Note: 7/27/2020 3:23 PM         Note completed by: Renetta Carey    Patient Name: Gena Walker  YOB: 1960    Diagnosis:Acute angina [facility]) [I20.9]  [facility] [I20.9]  Patient Admission Status: Inpatient    Date of Admission:7/24/2020  7:27 PM Length of Stay: 3 GLOS:      Current Plan of Care: to Cath lab  ________________________________________________________________________________________  PT AM-PAC:   / 24 per last evaluation on:     OT AM-PAC:   / 24 per last evaluation on:     DME Needs for discharge:  ________________________________________________________________________________________  Discharge Plan: plan return home     Tentative discharge date: later today or in AM    Current barriers to discharge: none at this time    Referrals completed:     Resources/ information provided:   ________________________________________________________________________________________  Case Management Notes:  SW was unable to meet with Pt as she is now down in cath lab. SW is aware that Pt lives at home alone and has Leaf. SW will follow and assist with any DC needs. Jordyn and her family were provided with choice of provider; she and her family are in agreement with the discharge plan.     Care Transition Patient: No    Renetta Cleaves, [facility]  Case Management Department  Ph: [phone]

## 2020-07-27 NOTE — PROGRESS NOTES
Movements: Extraocular movements intact. Pupils: Pupils are equal, round, and reactive to light. Cardiovascular:      Rate and Rhythm: Normal rate and regular rhythm. Heart sounds: No murmur. Pulmonary:      Effort: Pulmonary effort is normal.      Breath sounds: No wheezing or rales. Abdominal:      Palpations: Abdomen is soft. Tenderness: There is no abdominal tenderness. Musculoskeletal: Normal range of motion. Right lower leg: No edema. Left lower leg: No edema. Skin:     General: Skin is warm. Neurological:      General: No focal deficit present. Mental Status: She is alert and oriented to person, place, and time. Cranial Nerves: No cranial nerve deficit. Sensory: No sensory deficit. Motor: No weakness. LABS:    CBC:   Recent Labs     07/25/20 0617 07/26/20 0826 07/27/20  0525   WBC 7.1 8.0 6.4   HGB 14.6 15.1 14.4   HCT 42.0 44.2 42.1    192 188   MCV 88.0 88.6 89.9     Renal:    Recent Labs     07/24/20 2007 07/25/20 0617 07/26/20 0826 07/27/20  0525    137 140 141   K 3.1* 3.2* 3.3* 3.7   CL 93* 96* 98* 100   CO2 30 31 30 29   BUN 11 10 17 17   CREATININE 0.8 0.7 0.7 0.8   GLUCOSE 185* 109* 121* 104*   CALCIUM 10.0 9.8 10.0 9.4   MG 2.10 2.10 1.90  --    ANIONGAP 13 10 12 12     Troponin:   Recent Labs     07/24/20 2007 07/25/20  0949   TROPONINI <0.01 <0.01     Cultures:  -----------------------------------------------------------------  RAD:   XR CHEST (2 VW)   Final Result      No acute pulmonary disease. NM MYOCARDIAL SPECT REST EXERCISE OR RX    (Results Pending)       Assessment/Plan:     Chest pain  - Nitro PRN, telemetry  - On aspirin 81 mg daily, atorvastatin. Statin 20 mg and amlodipine 5 mg.   - Hold chlorthalidone given hypokalemia. - Cardiology consult: Cath today     Hypothyroidism  - Continue home synthroid  - TSH tomorrow AM    HLD  - Not on statin in past. Has history of elevated CK.  Last  in

## 2020-07-28 VITALS
WEIGHT: 171.96 LBS | TEMPERATURE: 97.7 F | BODY MASS INDEX: 31.64 KG/M2 | OXYGEN SATURATION: 96 % | HEART RATE: 74 BPM | HEIGHT: 62 IN | SYSTOLIC BLOOD PRESSURE: 115 MMHG | DIASTOLIC BLOOD PRESSURE: 67 MMHG | RESPIRATION RATE: 18 BRPM

## 2020-07-28 LAB
ANION GAP SERPL CALCULATED.3IONS-SCNC: 7 MMOL/L (ref 3–16)
BASOPHILS ABSOLUTE: 0 K/UL (ref 0–0.2)
BASOPHILS RELATIVE PERCENT: 0.1 %
BUN BLDV-MCNC: 17 MG/DL (ref 7–20)
CALCIUM SERPL-MCNC: 9.1 MG/DL (ref 8.3–10.6)
CHLORIDE BLD-SCNC: 104 MMOL/L (ref 99–110)
CO2: 28 MMOL/L (ref 21–32)
CREAT SERPL-MCNC: 0.8 MG/DL (ref 0.6–1.2)
EOSINOPHILS ABSOLUTE: 0 K/UL (ref 0–0.6)
EOSINOPHILS RELATIVE PERCENT: 0.6 %
GFR AFRICAN AMERICAN: >60
GFR NON-AFRICAN AMERICAN: >60
GLUCOSE BLD-MCNC: 104 MG/DL (ref 70–99)
HCT VFR BLD CALC: 39.2 % (ref 36–48)
HEMOGLOBIN: 13.2 G/DL (ref 12–16)
LYMPHOCYTES ABSOLUTE: 1.7 K/UL (ref 1–5.1)
LYMPHOCYTES RELATIVE PERCENT: 23.5 %
MCH RBC QN AUTO: 30.5 PG (ref 26–34)
MCHC RBC AUTO-ENTMCNC: 33.8 G/DL (ref 31–36)
MCV RBC AUTO: 90.2 FL (ref 80–100)
MONOCYTES ABSOLUTE: 0.5 K/UL (ref 0–1.3)
MONOCYTES RELATIVE PERCENT: 7.1 %
NEUTROPHILS ABSOLUTE: 5 K/UL (ref 1.7–7.7)
NEUTROPHILS RELATIVE PERCENT: 68.7 %
PDW BLD-RTO: 13.5 % (ref 12.4–15.4)
PLATELET # BLD: 190 K/UL (ref 135–450)
PMV BLD AUTO: 8.3 FL (ref 5–10.5)
POC ACT LR: 166 SEC
POC ACT LR: 184 SEC
POC ACT LR: 186 SEC
POC ACT LR: 193 SEC
POC ACT LR: 244 SEC
POTASSIUM REFLEX MAGNESIUM: 4.1 MMOL/L (ref 3.5–5.1)
RBC # BLD: 4.34 M/UL (ref 4–5.2)
SODIUM BLD-SCNC: 139 MMOL/L (ref 136–145)
WBC # BLD: 7.3 K/UL (ref 4–11)

## 2020-07-28 PROCEDURE — 80048 BASIC METABOLIC PNL TOTAL CA: CPT

## 2020-07-28 PROCEDURE — 6370000000 HC RX 637 (ALT 250 FOR IP): Performed by: STUDENT IN AN ORGANIZED HEALTH CARE EDUCATION/TRAINING PROGRAM

## 2020-07-28 PROCEDURE — 85025 COMPLETE CBC W/AUTO DIFF WBC: CPT

## 2020-07-28 PROCEDURE — 36415 COLL VENOUS BLD VENIPUNCTURE: CPT

## 2020-07-28 PROCEDURE — 99233 SBSQ HOSP IP/OBS HIGH 50: CPT | Performed by: INTERNAL MEDICINE

## 2020-07-28 RX ORDER — ATORVASTATIN CALCIUM 40 MG/1
40 TABLET, FILM COATED ORAL NIGHTLY
Qty: 30 TABLET | Refills: 3 | Status: SHIPPED
Start: 2020-07-28 | End: 2021-01-15 | Stop reason: SINTOL

## 2020-07-28 RX ORDER — ATORVASTATIN CALCIUM 40 MG/1
40 TABLET, FILM COATED ORAL NIGHTLY
Status: DISCONTINUED | OUTPATIENT
Start: 2020-07-28 | End: 2020-07-28 | Stop reason: HOSPADM

## 2020-07-28 RX ADMIN — AMLODIPINE BESYLATE 5 MG: 5 TABLET ORAL at 08:22

## 2020-07-28 RX ADMIN — LEVOTHYROXINE SODIUM 88 MCG: 0.09 TABLET ORAL at 06:53

## 2020-07-28 RX ADMIN — ASPIRIN 81 MG: 81 TABLET, COATED ORAL at 08:22

## 2020-07-28 RX ADMIN — METOPROLOL SUCCINATE 12.5 MG: 25 TABLET, EXTENDED RELEASE ORAL at 08:22

## 2020-07-28 ASSESSMENT — PAIN SCALES - GENERAL
PAINLEVEL_OUTOF10: 0

## 2020-07-28 NOTE — PROGRESS NOTES
[facility]  Cardiology Inpatient Consult Service  Daily Progress Note        Admit Date:  7/24/2020    Referring Physician: Gudelia Alexander MD    Reason for Consultation/Chief Complaint:   Chest pain/angina/hypertension    Subjective: Interval history:  Cath ok  Non obs CAD  HTN ok     Medications:   atorvastatin  40 mg Oral Nightly    sodium chloride flush  10 mL Intravenous 2 times per day    metoprolol succinate  12.5 mg Oral Daily    aspirin  81 mg Oral Daily    amLODIPine  5 mg Oral Daily    levothyroxine  88 mcg Oral QAM AC    progesterone  200 mg Oral Nightly    sodium chloride flush  10 mL Intravenous 2 times per day    enoxaparin  40 mg Subcutaneous Daily    famotidine (PEPCID) injection  20 mg Intravenous BID       IV drips:      PRN:  sodium chloride flush, acetaminophen, ondansetron, nitroGLYCERIN, traZODone, sodium chloride flush, [DISCONTINUED] acetaminophen **OR** acetaminophen, polyethylene glycol, promethazine **OR** [DISCONTINUED] ondansetron, potassium chloride, magnesium sulfate, perflutren lipid microspheres      Objective:     Vitals:    07/27/20 2238 07/28/20 0102 07/28/20 0652 07/28/20 0820   BP: 125/69 129/69 125/75 115/67   Pulse: 68 71 65 74   Resp: 18 18 18 18   Temp: 98.4 °F (36.9 °C)  97.5 °F (36.4 °C) 97.7 °F (36.5 °C)   TempSrc: Oral  Oral Oral   SpO2: 97% 97% 99% 96%   Weight:       Height:           Intake/Output Summary (Last 24 hours) at 7/28/2020 0840  Last data filed at 7/28/2020 3291  Gross per 24 hour   Intake 1122.5 ml   Output 0 ml   Net 1122.5 ml     I/O last 3 completed shifts: In: 1002.5 [P.O.:240;  I.V.:762.5]  Out: 0   Wt Readings from Last 3 Encounters:   07/27/20 171 lb 15.3 oz (78 kg)   06/29/20 174 lb 9.6 oz (79.2 kg)   05/05/20 175 lb (79.4 kg)       Admit Wt: Weight: 170 lb (77.1 kg)   Todays Wt: Weight: 171 lb 15.3 oz (78 kg)    TELEMETRY: Sinus     Physical Exam:     General:  Awake, alert, NAD  Skin:  Warm and dry  Neck:  -JVP  Chest:  Clear to auscultation, respiration normal  Cardiovascular:  RRR S1S2  Abdomen:  Soft -  Extremities:  - edema      Objective    Labs:   Recent Labs     07/26/20  0826 07/27/20 0525 07/28/20  0511    141 139   K 3.3* 3.7 4.1   BUN 17 17 17   CREATININE 0.7 0.8 0.8   CL 98* 100 104   CO2 30 29 28   GLUCOSE 121* 104* 104*   CALCIUM 10.0 9.4 9.1   MG 1.90  --   --      Recent Labs     07/26/20  0826 07/27/20 0525 07/28/20  0511   WBC 8.0 6.4 7.3   HGB 15.1 14.4 13.2   HCT 44.2 42.1 39.2    188 190   MCV 88.6 89.9 90.2     Recent Labs     07/27/20 0525   TRIG 160*   HDL 43     No results for input(s): PTT, INR in the last 72 hours. Invalid input(s): PT  Recent Labs     07/25/20  0949 07/27/20 0525   CKTOTAL  --  109   TROPONINI <0.01  --      No results for input(s): BNP in the last 72 hours. No results for input(s): NTPROBNP in the last 72 hours. No results for input(s): TSH in the last 72 hours. Imaging:   I personally reviewed imaging studies including CXR, Stress test, TTE/JUDY. Assessment & Plan:     Non obs CAD/hypertension  - cath non obs CAD, plaque noted   - Continue amlodipine. And metop  - ASA statin  - home today  - follow up with me in 1 week  - dc diuretics     Thank you for allowing to us to participate in the care of Jordyn Laboy. Please call our service with questions. All questions and concerns were addressed to the patient/family. Alternatives to my treatment were discussed. The note was completed using EMR. Every effort was made to ensure accuracy; however, inadvertent computerized transcription errors may be present. I have personally reviewed the reports and images of labs, radiological studies, cardiac studies including ECG's and telemetry, current and old medical records. Torrey Catalan MD, [address] [facility]    7/28/2020 8:40 AM

## 2020-07-28 NOTE — PROGRESS NOTES
Patient discharging home at this time. IV and Telemetry box removed. Discharge instructions and prescriptions reviewed, all questions answered. Patient stated had no further questions at this time. All personal belongings packed and sent with patient. Patient leaving the unit via wheelchair with nursing staff to front entrance and daughter will drive home.

## 2020-07-28 NOTE — PROGRESS NOTES
VSS - afebrile. Pt is alert and oriented x 4 with no history of falls. Assessment completed as charted. Bed is in lowest position with 2/4 bed rails raised, wheels locked and call light within reach - patient wearing non-skid socks and verbalizes understanding to call out for assistance. No further requests at this time. Will continue to monitor.      Vitals:    07/27/20 2117   BP: 113/73   Pulse: 82   Resp: 18   Temp:    SpO2: 97%

## 2020-07-28 NOTE — DISCHARGE SUMMARY
INTERNAL MEDICINE DEPARTMENT AT [address] [place] [address]  DISCHARGE SUMMARY    Patient ID: Ovidio Pace                                             Discharge Date: 7/28/2020   Patient's PCP: Ta Wise MD                                          Discharge Physician: Siobhan Mendoza MD  Admit Date: 7/24/2020   Admitting Physician: Eden Vega MD     Hospital Problems           Last Modified POA    Acute angina [facility]) 7/24/2020 Yes        DISCHARGE DIAGNOSES:  - Typical chest pain  - Hypothyroidism  - HLD    Hospital Course:      Sergei Garrido is a 56 y.o. female with a PMH of HTN, Hypothyroidism, and arthritis who presents with chest pressure over the left side of her chest. It was constant, 5/10, non-radiating pain. This is her first episode of chest pain/pressure. The pressure was associated with nausea and headache. Has FHx of cardiac disease. In the ED, Trop<0.01, EKG shows T wave flattening in III and V1. Her chest pain improved with nitro. Other notable labs include K 3.1 and glucose 185. Cardiology was consulted, discontinued chlorthalidone, left heart cath was done on 07/27 which showed 20% lesion in mid LAD. 30% stenosis @circumflex. SHEA grade 3 flow in all coronary arteries. Pt was started on Toprol XL 12.5 QD, Lipitor 40mg. Discontinued chlorthalidone. Follow up with primary care for medication optimization. Pt was seen at bedside today AM, resting comfortably. Does not have any new complaints. Ambulating in the room, feels steady on her feet. Endorses appetite, however does not like taste of hospital food. Having normal BM and is voiding as usual. Denies N/V, CP, SOB, cough, palpitations, leg swelling, abdominal pain, diarrhea. Patient is stable to be discharged home. She was started on amlodipine 5mg QD, aspirin 81mg QD, atorvastatin 40m QD, metorprolol succinate 12.5mg QD, nitroglycerin 0.4mg PRN (max 3 doses). Her trazodone was changed to 100mg QD PRN.  She stopped taking chlorthalidone and metoprolol tartrate. Physical Exam:  /67   Pulse 74   Temp 97.7 °F (36.5 °C) (Oral)   Resp 18   Ht 5' 2\" (1.575 m)   Wt 171 lb 15.3 oz (78 kg)   SpO2 96%   BMI 31.45 kg/m²     Constitutional:       General: She is not in acute distress. HENT:      Mouth/Throat:      Mouth: Mucous membranes are moist.   Eyes:      Extraocular Movements: Extraocular movements intact. Pupils: Pupils are equal, round, and reactive to light. Cardiovascular:      Rate and Rhythm: Normal rate and regular rhythm. Heart sounds: No murmur. Pulmonary:      Effort: Pulmonary effort is normal.      Breath sounds: No wheezing or rales. Abdominal:      Palpations: Abdomen is soft. Tenderness: There is no abdominal tenderness. Musculoskeletal: Normal range of motion. Right lower leg: No edema. Left lower leg: No edema. Skin:     General: Skin is warm. Neurological:      General: No focal deficit present. Mental Status: She is alert and oriented to person, place, and time. Cranial Nerves: No cranial nerve deficit. Sensory: No sensory deficit. Motor: No weakness. Consults:  IP CONSULT TO HOSPITALIST  IP CONSULT TO CARDIOLOGY    Disposition: home  Discharged Condition: Stable  Follow Up: Primary Care Physician in one week, Cardiologist in 1 week    DISCHARGE MEDICATION:     Medication List      START taking these medications    amLODIPine 5 MG tablet  Commonly known as:  NORVASC  Take 1 tablet by mouth daily     aspirin 81 MG EC tablet  Take 1 tablet by mouth daily     atorvastatin 40 MG tablet  Commonly known as:  LIPITOR  Take 1 tablet by mouth nightly     metoprolol succinate 25 MG extended release tablet  Commonly known as:  TOPROL XL  Take 0.5 tablets by mouth daily     nitroGLYCERIN 0.4 MG SL tablet  Commonly known as:  NITROSTAT  up to max of 3 total doses. If no relief after 1 dose, call 911.         CHANGE how you take these medications traZODone 100 MG tablet  Commonly known as:  DESYREL  Take 1 tablet by mouth nightly as needed for Sleep  What changed:  Another medication with the same name was removed. Continue taking this medication, and follow the directions you see here. CONTINUE taking these medications    diclofenac 3 % gel  Commonly known as:  SOLARAZE     levothyroxine 88 MCG tablet  Commonly known as:  SYNTHROID     progesterone 200 MG capsule  Commonly known as:  PROMETRIUM        STOP taking these medications    chlorthalidone 50 MG tablet  Commonly known as:  HYGROTEN     Magnesium Gluconate 550 MG Tabs     metoprolol tartrate 25 MG tablet  Commonly known as:  LOPRESSOR     naproxen 500 MG tablet  Commonly known as:  NAPROSYN           Where to Get Your Medications      These medications were sent to [facility] [address] [address], . [address] [phone]  [address], [address] [facility] [address]    Phone:  [phone]   · amLODIPine 5 MG tablet  · aspirin 81 MG EC tablet  · atorvastatin 40 MG tablet  · metoprolol succinate 25 MG extended release tablet  · nitroGLYCERIN 0.4 MG SL tablet       Activity: activity as tolerated  Diet: regular diet  Wound Care: none needed    Time Spent on discharge is more than 30 minutes    Signed:  Leigh Ann Koehler MD   7/28/2020    Patient seen and examined, labs and imaging studies reviewed, agree with assessment and plan as outlined above. Continue with dc planning asked to monitor for recurrence of symptoms or nrew symptoms including but not limited to chest pain shortness of breath nausea vomiting fevers or chills and seek immediate medical attention or call 911. Greater than 30 minutes spent on case on day of dsicharge.       Christiano Giron MD [facility]

## 2020-07-28 NOTE — PROGRESS NOTES
Internal Medicine PGY- 1 Resident Progress Note    PCP: Dorene Hernandez MD    Date of Admission: 7/24/2020    Chief Complaint: Chest Pain    Subjective: Patient is ready to go home today. Medications:  Reviewed    Infusion Medications   Scheduled Medications    atorvastatin  40 mg Oral Nightly    sodium chloride flush  10 mL Intravenous 2 times per day    metoprolol succinate  12.5 mg Oral Daily    aspirin  81 mg Oral Daily    amLODIPine  5 mg Oral Daily    levothyroxine  88 mcg Oral QAM AC    progesterone  200 mg Oral Nightly    sodium chloride flush  10 mL Intravenous 2 times per day    enoxaparin  40 mg Subcutaneous Daily    famotidine (PEPCID) injection  20 mg Intravenous BID     PRN Meds: sodium chloride flush, acetaminophen, ondansetron, nitroGLYCERIN, traZODone, sodium chloride flush, [DISCONTINUED] acetaminophen **OR** acetaminophen, polyethylene glycol, promethazine **OR** [DISCONTINUED] ondansetron, potassium chloride, magnesium sulfate, perflutren lipid microspheres      Intake/Output Summary (Last 24 hours) at 7/28/2020 0920  Last data filed at 7/28/2020 1653  Gross per 24 hour   Intake 1122.5 ml   Output 0 ml   Net 1122.5 ml       Physical Exam Performed:    /67   Pulse 74   Temp 97.7 °F (36.5 °C) (Oral)   Resp 18   Ht 5' 2\" (1.575 m)   Wt 171 lb 15.3 oz (78 kg)   SpO2 96%   BMI 31.45 kg/m²     General appearance: No apparent distress, appears stated age and cooperative. HEENT: Pupils equal, round, and reactive to light. Respiratory:  Normal respiratory effort. Clear to auscultation, bilaterally without Rales/Wheezes/Rhonchi. Cardiovascular: Regular rate and rhythm with normal S1/S2 without murmurs, rubs or gallops. Abdomen: Soft, non-tender, non-distended with normal bowel sounds. Musculoskeletal: No clubbing, cyanosis or edema bilaterally. Neurologic:  Neurovascularly intact without any focal sensory/motor deficits.  Cranial nerves: II-XII intact, grossly non-focal.  Psychiatric: Alert and oriented, thought content appropriate, normal insight  Peripheral Pulses: +2 palpable, equal bilaterally     Labs:   Recent Labs     07/26/20  0826 07/27/20  0525 07/28/20  0511   WBC 8.0 6.4 7.3   HGB 15.1 14.4 13.2   HCT 44.2 42.1 39.2    188 190     Recent Labs     07/26/20  0826 07/27/20  0525 07/28/20  0511    141 139   K 3.3* 3.7 4.1   CL 98* 100 104   CO2 30 29 28   BUN 17 17 17   CREATININE 0.7 0.8 0.8   CALCIUM 10.0 9.4 9.1     No results for input(s): AST, ALT, BILIDIR, BILITOT, ALKPHOS in the last 72 hours. No results for input(s): INR in the last 72 hours. Recent Labs     07/25/20  0949 07/27/20  0525   CKTOTAL  --  109   TROPONINI <0.01  --        Urinalysis:      Lab Results   Component Value Date    NITRU Negative 12/23/2019    BLOODU Negative 12/23/2019    SPECGRAV 1.012 12/23/2019    GLUCOSEU Negative 12/23/2019       Radiology:  XR CHEST (2 VW)   Final Result      No acute pulmonary disease. Assessment/Plan:    Markel Dow, 56 y.o. female with a PMH of HLD and HTN who presented with CP. Admitted for acute angina. Suspicion for CAD as etiology. Chest pain  - Nitro PRN, telemetry  - On aspirin 81 mg daily, atorvastatin 40 mg and amlodipine 5 mg    - Cath (7/27/20): 20% lesion in mid LAD. 30% stenosis @circumflex. SHEA grade 3 flow in all coronary arteries. Recommended to treat aggressively for CAD.   - Can be discharged today  - F/u in 1 wk with Cardiology    Hypothyroidism  - Continue home synthroid  - TSH 2.38 (07/27)     HLD  - On atorvastatin 40mg QD started 07/28   - LDL 82 today    HTN   - On metoprolol succinate 12.5 QD  - On amlodipine 5mg QD  - D/c diuretics, per cardiology    DVT Prophylaxis: Lovenox 40  Diet: DIET CARDIAC;  Code Status: Full Code    Discussed the patient with MD Kenneth Stevenson MD  Internal Medicine Resident PGY-1  Contact via [address] [place] [address]

## 2020-07-28 NOTE — CARE COORDINATION
Case Management Assessment           Daily Note                 Date/ Time of Note: 7/28/2020 11:10 AM         Note completed by: Rashid Sotelo    Patient Name: Ev Torres  YOB: 1960    Diagnosis:Acute angina [facility]) [I20.9]  Acute angina ([facility] [I20.9]  Patient Admission Status: Inpatient    Date of Admission:7/24/2020  7:27 PM Length of Stay: 4 GLOS:      Current Plan of Care: DC home today  ________________________________________________________________________________________  PT AM-PAC:   / 24 per last evaluation on:     OT AM-PAC:   / 24 per last evaluation on:   DME Needs for discharge: none  ________________________________________________________________________________________  Discharge Plan: return home today    Tentative discharge date: today    Current barriers to discharge: none    Referrals completed:     Resources/ information provided:   ________________________________________________________________________________________  Case Management Notes:  Pt being DC home today-family will provide transport home. No DC needs at this time. Jordyn and her family were provided with choice of provider; she and her family are in agreement with the discharge plan.     Care Transition Patient: No    Rashid Sotelo, [facility]  Case Management Department  Ph: [phone]

## 2020-08-04 ENCOUNTER — OFFICE VISIT (OUTPATIENT)
Dept: CARDIOLOGY CLINIC | Age: 60
End: 2020-08-04
Payer: COMMERCIAL

## 2020-08-04 VITALS
HEIGHT: 62 IN | TEMPERATURE: 97.6 F | DIASTOLIC BLOOD PRESSURE: 80 MMHG | HEART RATE: 72 BPM | WEIGHT: 176.2 LBS | SYSTOLIC BLOOD PRESSURE: 120 MMHG | BODY MASS INDEX: 32.42 KG/M2

## 2020-08-04 PROBLEM — I25.10 NONOCCLUSIVE CORONARY ATHEROSCLEROSIS OF NATIVE CORONARY ARTERY: Status: ACTIVE | Noted: 2020-08-04

## 2020-08-04 PROCEDURE — 99214 OFFICE O/P EST MOD 30 MIN: CPT | Performed by: INTERNAL MEDICINE

## 2020-08-04 ASSESSMENT — ENCOUNTER SYMPTOMS
WHEEZING: 0
CHEST TIGHTNESS: 0
ABDOMINAL PAIN: 0
SHORTNESS OF BREATH: 0
VOMITING: 0
COUGH: 0
BLOOD IN STOOL: 0
EYE DISCHARGE: 0
BACK PAIN: 0
FACIAL SWELLING: 0
COLOR CHANGE: 0
ABDOMINAL DISTENTION: 0

## 2020-08-04 NOTE — ASSESSMENT & PLAN NOTE
Advised patient to discuss with her primary care physician since we are trying to minimize her risk factors at this time.

## 2020-08-04 NOTE — PROGRESS NOTES
[facility]     Outpatient Cardiology         Chief Complaint   Patient presents with    Follow-Up from Hospital       [facility]     Jordyn Day a 56 y.o. female here for non obstructive CAD/hypertension. Most recently I saw her in the office and ordered a stress echo, before she was able to get, she showed up in the hospital with chest pain. We proceeded with an angiogram which showed nonobstructive coronary disease. No more episodes of chest pain since then. S/p cath 7/24/20, nonobstructive CAD. Hypertension is well controlled her current medications, no side effects. Even though she does not have CDA, we will start her on a statin for primary prevention given her nonobstructive coronary disease. PMH  Past Medical History:   Diagnosis Date    Acquired hypothyroidism 11/25/2019    Essential hypertension 11/25/2019       [facility]  Past Surgical History:   Procedure Laterality Date    DILATION AND CURETTAGE OF UTERUS          Social HIstory  Social History     Tobacco Use    Smoking status: Never Smoker    Smokeless tobacco: Never Used   Substance Use Topics    Alcohol use: Yes     Alcohol/week: 1.0 standard drinks     Types: 1 Glasses of wine per week     Frequency: Monthly or less     Drinks per session: 1 or 2    Drug use: Never       Family History  Family History   Problem Relation Age of Onset    Diabetes Mother     Hypertension Mother     Heart Attack Mother     Hypertension Father        Allergies   No Known Allergies    Medications:     Home Medications:  Were reviewed and are listed in nursing record. and/or listed below    Prior to Admission medications    Medication Sig Start Date End Date Taking?  Authorizing Provider   atorvastatin (LIPITOR) 40 MG tablet Take 1 tablet by mouth nightly 7/28/20  Yes Heidi Jaramillo MD   aspirin 81 MG EC tablet Take 1 tablet by mouth daily 7/28/20  Yes Adithya Sneed MD   nitroGLYCERIN (NITROSTAT) 0.4 MG SL tablet up to max of 3 total doses. If no relief after 1 dose, call 911. 7/27/20  Yes Redd Almanzar MD   metoprolol succinate (TOPROL XL) 25 MG extended release tablet Take 0.5 tablets by mouth daily 7/28/20  Yes Redd Almanzar MD   amLODIPine (NORVASC) 5 MG tablet Take 1 tablet by mouth daily 7/28/20  Yes Redd Almanzar MD   traZODone (DESYREL) 100 MG tablet Take 1 tablet by mouth nightly as needed for Sleep 3/13/20  Yes Belem Weeks MD   levothyroxine (SYNTHROID) 88 MCG tablet Take 88 mcg by mouth daily 2/7/19  Yes Historical Provider, MD   progesterone (PROMETRIUM) 200 MG capsule Take 200 mg by mouth nightly 10/18/19  Yes Historical Provider, MD   diclofenac (SOLARAZE) 3 % gel Apply 3 % topically 2 times daily    Historical Provider, MD        Review of Systems   Constitutional: Negative for activity change, appetite change, diaphoresis, fatigue, fever and unexpected weight change. HENT: Negative for congestion, facial swelling, mouth sores and nosebleeds. Eyes: Negative for discharge and visual disturbance. Respiratory: Negative for cough, chest tightness, shortness of breath and wheezing. Cardiovascular: Negative for chest pain, palpitations and leg swelling. Gastrointestinal: Negative for abdominal distention, abdominal pain, blood in stool and vomiting. Endocrine: Negative for cold intolerance, heat intolerance and polyuria. Genitourinary: Negative for difficulty urinating, dysuria, frequency and hematuria. Musculoskeletal: Negative for back pain, joint swelling, myalgias and neck pain. Skin: Negative for color change, pallor and rash. Allergic/Immunologic: Negative for immunocompromised state. Neurological: Negative for dizziness, syncope, weakness, light-headedness, numbness and headaches. Hematological: Negative for adenopathy. Does not bruise/bleed easily. Psychiatric/Behavioral: Negative for behavioral problems, confusion, decreased concentration and suicidal ideas.  The patient is not %.  Left Ventricular Wall Motion And Contractility:  Within normal limits. End-Diastolic Volume: 86 ml  End-Systolic Volume: 24 ml    Last Cath (if available): 7/24/20  CONCLUSIONS:  1. Normal left ventricular systolic function. 2.  20% lesion in the mid LAD with mild turbulence at that site, but the  corresponding IFR was 0.95. The circumflex has a mid 30% stenosis and  this lesion had a corresponding IFR of 1.00. Right coronary dominant is  present. There is minimal turbulence in the LAD, but there is still  SHEA grade 3 flow in all the coronary arteries. 3.  LV ejection fraction 60%. No gradient upon pullback. LVEDP 7 mmHg. Last TTE/JUDY(if available):    Last CMR  (if available):      Assessment / Plan:     Essential hypertension  Controlled on current medications. No changes today. Previously on chlorthalidone with significant hypokalemia. Abnormal ECG  Heart cath with nonobstructive CAD. Nonocclusive coronary atherosclerosis of native coronary artery  Continue risk factor modification. Continue aspirin and statin. Continue beta-blocker. Hyperglycemia  Advised patient to discuss with her primary care physician since we are trying to minimize her risk factors at this time. I had the opportunity to review the clinical symptoms and presentation of [place] Patient. Patient's allergies and medications were reviewed and updated. Patient's past medical, surgical, social and family history were reviewed and updated. Patient's testing including laboratory, ECGs, monitor, imaging (TTE,JUDY,CMR,cath) were reviewed. Tobacco use was discussed with the patient and educated on the negative effects. I have asked the patient to not utilize these agents. All questions and concerns were addressed to the patient/family. Alternatives to my treatment were discussed. The note was completed using EMR.  Every effort wasmade to ensure accuracy; however, inadvertent computerized transcription errors may be present. Thank you for allowing me to participate in thecare or [address] Arizmendi MD, [facility], [facility]

## 2020-08-05 LAB
REPORT: NORMAL
SARS-COV-2: NOT DETECTED
THIS TEST SENT TO: NORMAL

## 2020-08-10 ENCOUNTER — OFFICE VISIT (OUTPATIENT)
Dept: PRIMARY CARE CLINIC | Age: 60
End: 2020-08-10
Payer: COMMERCIAL

## 2020-08-10 VITALS
OXYGEN SATURATION: 98 % | SYSTOLIC BLOOD PRESSURE: 126 MMHG | DIASTOLIC BLOOD PRESSURE: 84 MMHG | RESPIRATION RATE: 16 BRPM | BODY MASS INDEX: 32.04 KG/M2 | WEIGHT: 175.2 LBS | HEART RATE: 68 BPM | TEMPERATURE: 98 F

## 2020-08-10 PROCEDURE — 99214 OFFICE O/P EST MOD 30 MIN: CPT | Performed by: INTERNAL MEDICINE

## 2020-08-10 RX ORDER — ROSUVASTATIN CALCIUM 20 MG/1
20 TABLET, COATED ORAL DAILY
Qty: 30 TABLET | Refills: 3 | Status: SHIPPED | OUTPATIENT
Start: 2020-08-10 | End: 2020-11-19

## 2020-08-10 NOTE — PROGRESS NOTES
Ev Torres   Date ofBirth:  1960    Date of Visit:  8/10/2020    Chief Complaint   Patient presents with    Follow-Up from Hospital     Pressure in chest        HPI  Pt states she was hospitalized from 7/24/2-7/28/20. Pt states she had chest pain. Pt is taking Metoprolol ER 25mg 1/2  po q day and Amlodipine. Metoprolol tartate 25mg was changed to Metoprolol ER 25mg po q day. Pt states Chlorthalidone was discontinued. Pt was started on Atorvastatin 40mg po q day and aspirin 81mg po q day. Pt states her chest pain has resolved. Leg pain-Pt has noticed leg pain since she went back to work on 8/3/20. Pt states she walks around all day at work. Pt c/o right leg pain. Pt states sometimes pain is sharp. Pt denies difficulty walking. Pt states sometimes her abdomen hurts since she has been taking Atorvastatin. Pt states she didn't have it prior to Atorvastatin. Pt is taking Aleve which helps more than Tylenol. Hypertension- Pt takes Amlodipine 5mg po q day. Pt states her blood pressure has been good. Pt decreases salt. Hypothyroidism- Pt takes Levothyroxine 88mcg po q day. Pt denies weight gain, constipation, fatigue, hair thinning, and cold intolerance. Insomnia- Pt takes Trazodone 150mg po qhs and states it helps. Prediabetes- Pt decreases carbohydrates. Review of Systems   Constitutional: Negative for activity change, appetite change, chills, fatigue, fever and unexpected weight change. HENT: Negative for congestion, postnasal drip, rhinorrhea, sinus pressure, sore throat and trouble swallowing. Eyes: Negative for visual disturbance. Respiratory: Negative for cough, chest tightness, shortness of breath and wheezing. Cardiovascular: Positive for chest pain. Negative for palpitations and leg swelling. Gastrointestinal: Positive for abdominal pain (with taking Atorvastatin). Negative for abdominal distention, blood in stool, constipation, diarrhea, nausea and vomiting.    Endocrine: Negative for cold intolerance and heat intolerance. Genitourinary: Negative for dysuria, frequency and hematuria. Musculoskeletal: Positive for myalgias. Negative for arthralgias and back pain. Skin: Negative for rash. Neurological: Negative for dizziness, tremors, syncope, weakness, light-headedness, numbness and headaches. Psychiatric/Behavioral: Positive for sleep disturbance. Negative for decreased concentration and dysphoric mood. The patient is not nervous/anxious. No Known Allergies  Outpatient Medications Marked as Taking for the 8/10/20 encounter (Office Visit) with Colleen Fatima MD   Medication Sig Dispense Refill    rosuvastatin (CRESTOR) 20 MG tablet Take 1 tablet by mouth daily 30 tablet 3    atorvastatin (LIPITOR) 40 MG tablet Take 1 tablet by mouth nightly 30 tablet 3    aspirin 81 MG EC tablet Take 1 tablet by mouth daily 30 tablet 3    metoprolol succinate (TOPROL XL) 25 MG extended release tablet Take 0.5 tablets by mouth daily 30 tablet 3    amLODIPine (NORVASC) 5 MG tablet Take 1 tablet by mouth daily 30 tablet 3    traZODone (DESYREL) 100 MG tablet Take 1 tablet by mouth nightly as needed for Sleep 30 tablet 3    levothyroxine (SYNTHROID) 88 MCG tablet Take 88 mcg by mouth daily      progesterone (PROMETRIUM) 200 MG capsule Take 200 mg by mouth nightly      diclofenac (SOLARAZE) 3 % gel Apply 3 % topically 2 times daily PRN           Vitals:    08/10/20 1620   BP: 126/84   Pulse: 68   Resp: 16   Temp: 98 °F (36.7 °C)   SpO2: 98%   Weight: 175 lb 3.2 oz (79.5 kg)     Body mass index is 32.04 kg/m². Physical Exam  Nursing note reviewed. Constitutional:       General: She is not in acute distress. Appearance: Normal appearance. She is well-developed. HENT:      Mouth/Throat:      Pharynx: Oropharynx is clear. Eyes:      General: Lids are normal.      Extraocular Movements: Extraocular movements intact.       Conjunctiva/sclera: Conjunctivae normal. Pupils: Pupils are equal, round, and reactive to light. Neck:      Musculoskeletal: Neck supple. Thyroid: No thyromegaly. Vascular: No carotid bruit. Cardiovascular:      Rate and Rhythm: Normal rate and regular rhythm. Heart sounds: Normal heart sounds, S1 normal and S2 normal. No murmur. No friction rub. No gallop. Pulmonary:      Effort: Pulmonary effort is normal. No respiratory distress. Breath sounds: Normal breath sounds. No wheezing, rhonchi or rales. Abdominal:      General: Bowel sounds are normal. There is no distension. Palpations: Abdomen is soft. Tenderness: There is no abdominal tenderness. Musculoskeletal:      Right lower leg: She exhibits tenderness. No edema. Left lower leg: No edema. Lymphadenopathy:      Head:      Right side of head: No submandibular adenopathy. Left side of head: No submandibular adenopathy. Neurological:      Mental Status: She is alert and oriented to person, place, and time. Gait: Gait is intact. Psychiatric:         Mood and Affect: Mood normal.           No results found for this visit on 08/10/20. Lab Review   Admission on 07/24/2020, Discharged on 07/28/2020   Component Date Value    Ventricular Rate 07/24/2020 67     Atrial Rate 07/24/2020 67     P-R Interval 07/24/2020 174     QRS Duration 07/24/2020 100     Q-T Interval 07/24/2020 408     QTc Calculation (Bazett) 07/24/2020 431     P Axis 07/24/2020 60     R Axis 07/24/2020 -28     T Axis 07/24/2020 49     Diagnosis 07/24/2020 EKG performed in ER and to be interpreted by ER physician. Confirmed by MD, ER [id number]),  Contreras Clifton ([phone]) on 7/24/2020 7:35:55 PM     WBC 07/24/2020 8.5     RBC 07/24/2020 4.83     Hemoglobin 07/24/2020 14.9     Hematocrit 07/24/2020 43.3     MCV 07/24/2020 89.6     MCH 07/24/2020 30.8     MCHC 07/24/2020 34.4     RDW 07/24/2020 13.4     Platelets 77/63/3903 192     MPV 07/24/2020 8.9     Neutrophils % Interval 07/25/2020 176     QRS Duration 07/25/2020 94     Q-T Interval 07/25/2020 442     QTc Calculation (Bazett) 07/25/2020 422     P Axis 07/25/2020 60     R Axis 07/25/2020 -43     T Axis 07/25/2020 39     Diagnosis 07/25/2020 Sinus bradycardia with occasional Premature ventricular complexesPossible Left atrial enlargementLeft axis deviationLeft ventricular hypertrophyAnteroseptal infarct , age undeterminedAbnormal ECGConfirmed by Joe Murray ([id number]) on 7/25/2020 11:33:04 AM     Report 07/25/2020 SEE IMAGES     SARS-CoV-2 07/25/2020 Not Detected     This Test Sent To 07/25/2020 [facility]     Sodium 07/26/2020 140     Potassium reflex Magnesi* 07/26/2020 3.3*    Chloride 07/26/2020 98*    CO2 07/26/2020 30     Anion Gap 07/26/2020 12     Glucose 07/26/2020 121*    BUN 07/26/2020 17     CREATININE 07/26/2020 0.7     GFR Non- 07/26/2020 >60     GFR  07/26/2020 >60     Calcium 07/26/2020 10.0     WBC 07/26/2020 8.0     RBC 07/26/2020 4.98     Hemoglobin 07/26/2020 15.1     Hematocrit 07/26/2020 44.2     MCV 07/26/2020 88.6     MCH 07/26/2020 30.4     MCHC 07/26/2020 34.3     RDW 07/26/2020 13.9     Platelets 12/87/0765 192     MPV 07/26/2020 9.0     Neutrophils % 07/26/2020 72.6     Lymphocytes % 07/26/2020 20.5     Monocytes % 07/26/2020 6.5     Eosinophils % 07/26/2020 0.3     Basophils % 07/26/2020 0.1     Neutrophils Absolute 07/26/2020 5.8     Lymphocytes Absolute 07/26/2020 1.6     Monocytes Absolute 07/26/2020 0.5     Eosinophils Absolute 07/26/2020 0.0     Basophils Absolute 07/26/2020 0.0     Magnesium 07/26/2020 1.90     Sodium 07/27/2020 141     Potassium reflex Magnesi* 07/27/2020 3.7     Chloride 07/27/2020 100     CO2 07/27/2020 29     Anion Gap 07/27/2020 12     Glucose 07/27/2020 104*    BUN 07/27/2020 17     CREATININE 07/27/2020 0.8     GFR Non- 07/27/2020 >60     GFR  07/27/2020 >60     Calcium 07/27/2020 9.4     WBC 07/27/2020 6.4     RBC 07/27/2020 4.69     Hemoglobin 07/27/2020 14.4     Hematocrit 07/27/2020 42.1     MCV 07/27/2020 89.9     MCH 07/27/2020 30.6     MCHC 07/27/2020 34.1     RDW 07/27/2020 13.5     Platelets 93/26/0956 188     MPV 07/27/2020 8.6     Neutrophils % 07/27/2020 59.1     Lymphocytes % 07/27/2020 32.7     Monocytes % 07/27/2020 7.5     Eosinophils % 07/27/2020 0.6     Basophils % 07/27/2020 0.1     Neutrophils Absolute 07/27/2020 3.8     Lymphocytes Absolute 07/27/2020 2.1     Monocytes Absolute 07/27/2020 0.5     Eosinophils Absolute 07/27/2020 0.0     Basophils Absolute 07/27/2020 0.0     Total CK 07/27/2020 109     TSH 07/27/2020 2.38     Left Ventricular Ejectio* 07/27/2020 60     LEFT VENTRICULAR EJECTIO* 07/27/2020 Cardiac Cath     POC ACT LR 07/27/2020 250     Cholesterol, Total 07/27/2020 157     Triglycerides 07/27/2020 160*    HDL 07/27/2020 43     LDL Calculated 07/27/2020 82     VLDL Cholesterol Calcula* 07/27/2020 32     Sodium 07/28/2020 139     Potassium reflex Magnesi* 07/28/2020 4.1     Chloride 07/28/2020 104     CO2 07/28/2020 28     Anion Gap 07/28/2020 7     Glucose 07/28/2020 104*    BUN 07/28/2020 17     CREATININE 07/28/2020 0.8     GFR Non- 07/28/2020 >60     GFR  07/28/2020 >60     Calcium 07/28/2020 9.1     WBC 07/28/2020 7.3     RBC 07/28/2020 4.34     Hemoglobin 07/28/2020 13.2     Hematocrit 07/28/2020 39.2     MCV 07/28/2020 90.2     MCH 07/28/2020 30.5     MCHC 07/28/2020 33.8     RDW 07/28/2020 13.5     Platelets 45/89/1935 190     MPV 07/28/2020 8.3     Neutrophils % 07/28/2020 68.7     Lymphocytes % 07/28/2020 23.5     Monocytes % 07/28/2020 7.1     Eosinophils % 07/28/2020 0.6     Basophils % 07/28/2020 0.1     Neutrophils Absolute 07/28/2020 5.0     Lymphocytes Absolute 07/28/2020 1.7     Monocytes Absolute 07/28/2020 0.5  Eosinophils Absolute 07/28/2020 0.0     Basophils Absolute 07/28/2020 0.0     POC ACT LR 07/27/2020 244     POC ACT LR 07/27/2020 186     POC ACT LR 07/27/2020 193     POC ACT LR 07/27/2020 184     POC ACT LR 07/27/2020 166    Office Visit on 06/26/2020   Component Date Value    SARS-CoV-2 06/27/2020 Not Detected     Source 06/27/2020 NP swab    Hospital Outpatient Visit on 05/18/2020   Component Date Value    TSH Reflex FT4 05/18/2020 4.27*    Aldolase 05/18/2020 4.9     Total CK 05/18/2020 344*    Sed Rate 05/18/2020 10     CRP 05/18/2020 0.9     Sodium 05/18/2020 139     Potassium 05/18/2020 3.4*    Chloride 05/18/2020 96*    CO2 05/18/2020 31     Anion Gap 05/18/2020 12     Glucose 05/18/2020 114*    BUN 05/18/2020 16     CREATININE 05/18/2020 0.8     GFR Non- 05/18/2020 >60     GFR  05/18/2020 >60     Calcium 05/18/2020 9.6     Total Protein 05/18/2020 7.2     Alb 05/18/2020 4.4     Albumin/Globulin Ratio 05/18/2020 1.6     Total Bilirubin 05/18/2020 0.5     Alkaline Phosphatase 05/18/2020 56     ALT 05/18/2020 20     AST 05/18/2020 20     Globulin 05/18/2020 2.8     WBC 05/18/2020 6.7     RBC 05/18/2020 5.03     Hemoglobin 05/18/2020 15.1     Hematocrit 05/18/2020 45.1     MCV 05/18/2020 89.6     MCH 05/18/2020 30.1     MCHC 05/18/2020 33.6     RDW 05/18/2020 14.0     Platelets 55/57/2336 185     MPV 05/18/2020 9.0     Neutrophils % 05/18/2020 68.0     Lymphocytes % 05/18/2020 22.6     Monocytes % 05/18/2020 8.2     Eosinophils % 05/18/2020 1.1     Basophils % 05/18/2020 0.1     Neutrophils Absolute 05/18/2020 4.5     Lymphocytes Absolute 05/18/2020 1.5     Monocytes Absolute 05/18/2020 0.5     Eosinophils Absolute 05/18/2020 0.1     Basophils Absolute 05/18/2020 0.0     Misc Test Order 05/18/2020 SEE NOTE     T4 Free 05/18/2020 1.5    Orders Only on 03/13/2020   Component Date Value    Total CK 03/13/2020 307* Assessment/Plan     1. Chest pressure  - resolved    2. Nonocclusive coronary atherosclerosis of native coronary artery  - Cardiac cath done   - medical management  - Stop Atorvastatin due to side effects  - Start Rosuvastatin (CRESTOR) 20 MG tablet; Take 1 tablet by mouth daily  Dispense: 30 tablet; Refill: 3  - continue Aspirin 81mg once daily    3. Right leg pain  - Stop Atorvastatin  -Tylenol or Advil as needed    4. Essential hypertension  -stable  -Continue same medications  -Low sodium diet  -Regular aerobic exercise    5. Acquired hypothyroidism  -stable  -Continue same medications    6. Primary insomnia  -stable  -Continue same medications    7. Prediabetes  -Low carbohydrate diet  -Regular aerobic exercise      Discussed medications with patient, who voiced understanding of their use and indications. All questions answered. Return in about 2 weeks (around 8/24/2020) for leg pain.

## 2020-08-17 ASSESSMENT — ENCOUNTER SYMPTOMS
ABDOMINAL PAIN: 1
ABDOMINAL DISTENTION: 0
VOMITING: 0
SORE THROAT: 0
SINUS PRESSURE: 0
COUGH: 0
DIARRHEA: 0
CONSTIPATION: 0
BLOOD IN STOOL: 0
SHORTNESS OF BREATH: 0
TROUBLE SWALLOWING: 0
CHEST TIGHTNESS: 0
BACK PAIN: 0
NAUSEA: 0
RHINORRHEA: 0
WHEEZING: 0

## 2020-09-10 RX ORDER — TRAZODONE HYDROCHLORIDE 100 MG/1
TABLET ORAL
Qty: 30 TABLET | Refills: 2 | Status: SHIPPED | OUTPATIENT
Start: 2020-09-10 | End: 2020-12-19

## 2020-10-05 RX ORDER — TRAZODONE HYDROCHLORIDE 50 MG/1
TABLET ORAL
Qty: 30 TABLET | Refills: 1 | OUTPATIENT
Start: 2020-10-05

## 2020-10-05 NOTE — TELEPHONE ENCOUNTER
Rx refill for Trazodone 50mg denied because Rx for Trazodone 100mg filled on 9/10/20 with additional refills.

## 2020-10-05 NOTE — TELEPHONE ENCOUNTER
Medication:   Requested Prescriptions     Pending Prescriptions Disp Refills    traZODone (DESYREL) 50 MG tablet [Pharmacy Med Name: traZODone 50 MG TABLET] 30 tablet 1     Sig: TAKE ONE TABLET BY MOUTH ONCE NIGHTLY AS NEEDED FOR SLEEP        Last Filled:      Patient Phone Number: [phone] (home) [phone] (work)    Last appt: 8/10/2020   Next appt: Visit date not found    Last OARRS: No flowsheet data found.     [address]   [address]  [address] [facility] [address]  Phone: [phone] Fax: [phone]

## 2020-10-26 RX ORDER — TRAZODONE HYDROCHLORIDE 50 MG/1
TABLET ORAL
Qty: 30 TABLET | Refills: 1 | OUTPATIENT
Start: 2020-10-26

## 2020-10-30 RX ORDER — AMLODIPINE BESYLATE 5 MG/1
5 TABLET ORAL DAILY
Qty: 30 TABLET | Refills: 1 | Status: SHIPPED | OUTPATIENT
Start: 2020-10-30 | End: 2021-01-10 | Stop reason: SDUPTHER

## 2020-10-30 NOTE — TELEPHONE ENCOUNTER
Medication:   Requested Prescriptions     Pending Prescriptions Disp Refills    amLODIPine (NORVASC) 5 MG tablet 30 tablet 3     Sig: Take 1 tablet by mouth daily     Last Filled: 7.28.20    Last appt: 8/10/2020   Next appt: Visit date not found    Last OARRS: No flowsheet data found.

## 2020-10-30 NOTE — TELEPHONE ENCOUNTER
Pt called for a refill of rx below:    amLODIPine (NORVASC) 5 MG tablet [[id number]]     [facility] [address] [address], [address] [facility]

## 2020-11-18 NOTE — TELEPHONE ENCOUNTER
Medication:   Requested Prescriptions     Pending Prescriptions Disp Refills    rosuvastatin (CRESTOR) 20 MG tablet [Pharmacy Med Name: ROSUVASTATIN CALCIUM 20 MG TAB] 90 tablet 2     Sig: TAKE ONE TABLET BY MOUTH DAILY     Last Filled: 8.10.20    Last appt: 8/10/2020   Next appt: Visit date not found    Last OARRS: No flowsheet data found.

## 2020-11-19 RX ORDER — ROSUVASTATIN CALCIUM 20 MG/1
TABLET, COATED ORAL
Qty: 90 TABLET | Refills: 0 | Status: SHIPPED
Start: 2020-11-19 | End: 2021-01-15 | Stop reason: SINTOL

## 2020-12-21 NOTE — TELEPHONE ENCOUNTER
Medication:   Requested Prescriptions     Pending Prescriptions Disp Refills    rosuvastatin (CRESTOR) 20 MG tablet [Pharmacy Med Name: ROSUVASTATIN CALCIUM 20 MG TAB] 90 tablet 2     Sig: TAKE ONE TABLET BY MOUTH DAILY    traZODone (DESYREL) 100 MG tablet [Pharmacy Med Name: traZODone 100 MG TABLET] 30 tablet 1     Sig: TAKE ONE TABLET BY MOUTH ONCE NIGHTLY AS NEEDED FOR SLEEP       Last Filled:      Patient Phone Number: [phone] (home) [phone] (work)    Last appt: 8/10/2020   Next appt: Visit date not found    Last Lipid:   Lab Results   Component Value Date    CHOL 157 07/27/2020    TRIG 160 07/27/2020    HDL 43 07/27/2020    [address] 82 07/27/2020       Last OARRS: No flowsheet data found.     Preferred Pharmacy:   [facility] [id number] [place], [address] [place], [place] [address]  [address] [facility] [address]  Phone: [phone] Fax: [phone]

## 2020-12-23 RX ORDER — ROSUVASTATIN CALCIUM 20 MG/1
TABLET, COATED ORAL
Qty: 90 TABLET | Refills: 2 | OUTPATIENT
Start: 2020-12-23

## 2020-12-23 RX ORDER — TRAZODONE HYDROCHLORIDE 100 MG/1
TABLET ORAL
Qty: 30 TABLET | Refills: 1 | OUTPATIENT
Start: 2020-12-23

## 2020-12-23 NOTE — TELEPHONE ENCOUNTER
Call patient to schedule appointment for hypertension, insomnia, hypothyroidism, and prediabetes. Rx refill for Trazdone denied because it was already refilled on 12/19/20. Rx refill for Rosuvastatin denied because it was already refilled on 11/19/20 for a 90 day supply.

## 2020-12-30 ENCOUNTER — TELEPHONE (OUTPATIENT)
Dept: PRIMARY CARE CLINIC | Age: 60
End: 2020-12-30

## 2020-12-30 NOTE — TELEPHONE ENCOUNTER
Pt stated that she had a missed call last week and wanted to speak with Manoj Parrish about her scripts she stated that she was supposed to schedule,but when I offered to do that she refused to schedule please call her back.

## 2021-01-07 NOTE — TELEPHONE ENCOUNTER
Medication:   Requested Prescriptions     Pending Prescriptions Disp Refills    amLODIPine (NORVASC) 5 MG tablet 30 tablet 1     Sig: Take 1 tablet by mouth daily     Last Filled: 10.30.20    Last appt: 8/10/2020   Next appt: 1/15/2021    Last OARRS: No flowsheet data found.

## 2021-01-10 RX ORDER — AMLODIPINE BESYLATE 5 MG/1
5 TABLET ORAL DAILY
Qty: 30 TABLET | Refills: 1 | Status: SHIPPED | OUTPATIENT
Start: 2021-01-10 | End: 2021-01-19

## 2021-01-15 ENCOUNTER — OFFICE VISIT (OUTPATIENT)
Dept: PRIMARY CARE CLINIC | Age: 61
End: 2021-01-15
Payer: COMMERCIAL

## 2021-01-15 VITALS
SYSTOLIC BLOOD PRESSURE: 124 MMHG | TEMPERATURE: 97.2 F | WEIGHT: 173 LBS | RESPIRATION RATE: 14 BRPM | BODY MASS INDEX: 31.83 KG/M2 | OXYGEN SATURATION: 99 % | HEART RATE: 61 BPM | DIASTOLIC BLOOD PRESSURE: 88 MMHG | HEIGHT: 62 IN

## 2021-01-15 DIAGNOSIS — E03.9 ACQUIRED HYPOTHYROIDISM: ICD-10-CM

## 2021-01-15 DIAGNOSIS — M79.10 MYALGIA: ICD-10-CM

## 2021-01-15 DIAGNOSIS — I25.10 NONOCCLUSIVE CORONARY ATHEROSCLEROSIS OF NATIVE CORONARY ARTERY: ICD-10-CM

## 2021-01-15 DIAGNOSIS — I10 ESSENTIAL HYPERTENSION: ICD-10-CM

## 2021-01-15 DIAGNOSIS — F51.01 PRIMARY INSOMNIA: Primary | ICD-10-CM

## 2021-01-15 DIAGNOSIS — R73.03 PREDIABETES: ICD-10-CM

## 2021-01-15 LAB
A/G RATIO: 2.7 (ref 1.1–2.2)
ALBUMIN SERPL-MCNC: 4.8 G/DL (ref 3.4–5)
ALP BLD-CCNC: 118 U/L (ref 40–129)
ALT SERPL-CCNC: 17 U/L (ref 10–40)
ANION GAP SERPL CALCULATED.3IONS-SCNC: 10 MMOL/L (ref 3–16)
AST SERPL-CCNC: 19 U/L (ref 15–37)
BILIRUB SERPL-MCNC: 0.6 MG/DL (ref 0–1)
BUN BLDV-MCNC: 14 MG/DL (ref 7–20)
CALCIUM SERPL-MCNC: 9.6 MG/DL (ref 8.3–10.6)
CHLORIDE BLD-SCNC: 99 MMOL/L (ref 99–110)
CHOLESTEROL, TOTAL: 115 MG/DL (ref 0–199)
CO2: 29 MMOL/L (ref 21–32)
CREAT SERPL-MCNC: 0.8 MG/DL (ref 0.6–1.2)
GFR AFRICAN AMERICAN: >60
GFR NON-AFRICAN AMERICAN: >60
GLOBULIN: 1.8 G/DL
GLUCOSE BLD-MCNC: 108 MG/DL (ref 70–99)
HDLC SERPL-MCNC: 65 MG/DL (ref 40–60)
LDL CHOLESTEROL CALCULATED: 39 MG/DL
POTASSIUM SERPL-SCNC: 4.3 MMOL/L (ref 3.5–5.1)
SODIUM BLD-SCNC: 138 MMOL/L (ref 136–145)
TOTAL PROTEIN: 6.6 G/DL (ref 6.4–8.2)
TRIGL SERPL-MCNC: 55 MG/DL (ref 0–150)
TSH SERPL DL<=0.05 MIU/L-ACNC: 2.7 UIU/ML (ref 0.27–4.2)
VLDLC SERPL CALC-MCNC: 11 MG/DL

## 2021-01-15 PROCEDURE — 99214 OFFICE O/P EST MOD 30 MIN: CPT | Performed by: INTERNAL MEDICINE

## 2021-01-15 RX ORDER — SIMVASTATIN 10 MG
10 TABLET ORAL NIGHTLY
Qty: 30 TABLET | Refills: 0 | Status: SHIPPED | OUTPATIENT
Start: 2021-01-15 | End: 2021-02-15

## 2021-01-15 RX ORDER — TRAZODONE HYDROCHLORIDE 150 MG/1
150 TABLET ORAL NIGHTLY
Qty: 90 TABLET | Refills: 1 | Status: SHIPPED | OUTPATIENT
Start: 2021-01-15 | End: 2021-07-15

## 2021-01-15 ASSESSMENT — ENCOUNTER SYMPTOMS
BLOOD IN STOOL: 0
CONSTIPATION: 0
NAUSEA: 0
ABDOMINAL PAIN: 0
SHORTNESS OF BREATH: 0
TROUBLE SWALLOWING: 0
VOMITING: 0
RHINORRHEA: 0
WHEEZING: 0
CHEST TIGHTNESS: 0
DIARRHEA: 0
SINUS PRESSURE: 0
SORE THROAT: 0
COUGH: 0

## 2021-01-15 ASSESSMENT — PATIENT HEALTH QUESTIONNAIRE - PHQ9
SUM OF ALL RESPONSES TO PHQ QUESTIONS 1-9: 0
SUM OF ALL RESPONSES TO PHQ QUESTIONS 1-9: 0
SUM OF ALL RESPONSES TO PHQ9 QUESTIONS 1 & 2: 0
1. LITTLE INTEREST OR PLEASURE IN DOING THINGS: 0

## 2021-01-15 NOTE — PATIENT INSTRUCTIONS
-Continue same medications  -Discontinue Rosuvastatin (Crestor) due to muscle aches  -Start Simvastatin 10mg nightly in 1 week  -Increase Trazodone to 150mg nightly  -Low fat, low cholesterol diet  -Low sodium diet  -low carbohydrate diet  -Tylenol or Advil as needed  -Regular aerobic exercise

## 2021-01-16 DIAGNOSIS — I10 ESSENTIAL HYPERTENSION: Primary | ICD-10-CM

## 2021-01-16 DIAGNOSIS — F51.01 PRIMARY INSOMNIA: ICD-10-CM

## 2021-01-16 PROBLEM — M79.10 MYALGIA: Status: ACTIVE | Noted: 2021-01-16

## 2021-01-16 PROBLEM — R73.03 PREDIABETES: Status: ACTIVE | Noted: 2021-01-16

## 2021-01-16 LAB
ESTIMATED AVERAGE GLUCOSE: 116.9 MG/DL
HBA1C MFR BLD: 5.7 %

## 2021-01-18 NOTE — TELEPHONE ENCOUNTER
Medication:   Requested Prescriptions     Pending Prescriptions Disp Refills    traZODone (DESYREL) 100 MG tablet [Pharmacy Med Name: traZODone 100 MG TABLET] 30 tablet 0     Sig: TAKE ONE TABLET BY MOUTH NIGHTLY AS NEEDED FOR SLEEP    amLODIPine (NORVASC) 5 MG tablet [Pharmacy Med Name: amLODIPine BESYLATE 5 MG TAB] 30 tablet 0     Sig: TAKE ONE TABLET BY MOUTH DAILY       Last Filled:      Patient Phone Number: [phone] (home) [phone] (work)    Last appt: 1/15/2021   Next appt: Visit date not found    Last BMP:   Lab Results   Component Value Date     01/15/2021    K 4.3 01/15/2021    K 4.1 07/28/2020    CL 99 01/15/2021    CO2 29 01/15/2021    ANIONGAP 10 01/15/2021    GLUCOSE 108 01/15/2021    BUN 14 01/15/2021    CREATININE 0.8 01/15/2021    LABGLOM >60 01/15/2021    GFRAA >60 01/15/2021    GFRAA >60 08/17/2010    CALCIUM 9.6 01/15/2021      Last CMP:   Lab Results   Component Value Date     01/15/2021    K 4.3 01/15/2021    K 4.1 07/28/2020    CL 99 01/15/2021    CO2 29 01/15/2021    ANIONGAP 10 01/15/2021    GLUCOSE 108 01/15/2021    BUN 14 01/15/2021    CREATININE 0.8 01/15/2021    LABGLOM >60 01/15/2021    GFRAA >60 01/15/2021    GFRAA >60 08/17/2010    PROT 6.6 01/15/2021    LABALBU 4.8 01/15/2021    AGRATIO 2.7 01/15/2021    BILITOT 0.6 01/15/2021    ALKPHOS 118 01/15/2021    ALT 17 01/15/2021    AST 19 01/15/2021    GLOB 1.8 01/15/2021     Last Renal Function:   Lab Results   Component Value Date     01/15/2021    K 4.3 01/15/2021    K 4.1 07/28/2020    CL 99 01/15/2021    CO2 29 01/15/2021    GLUCOSE 108 01/15/2021    BUN 14 01/15/2021    CREATININE 0.8 01/15/2021    LABALBU 4.8 01/15/2021    CALCIUM 9.6 01/15/2021    GFR 50 08/17/2010    GFRAA >60 01/15/2021    GFRAA >60 08/17/2010       Last OARRS: No flowsheet data found.     Preferred Pharmacy:   [facility] [address] [place], [address] [place] [address]  [address] [place] [address]  Phone: [phone] Fax: [phone]

## 2021-01-19 RX ORDER — AMLODIPINE BESYLATE 5 MG/1
TABLET ORAL
Qty: 90 TABLET | Refills: 1 | Status: SHIPPED | OUTPATIENT
Start: 2021-01-19 | End: 2021-07-23 | Stop reason: SDUPTHER

## 2021-01-19 RX ORDER — TRAZODONE HYDROCHLORIDE 100 MG/1
TABLET ORAL
Qty: 30 TABLET | Refills: 0 | OUTPATIENT
Start: 2021-01-19

## 2021-02-14 DIAGNOSIS — I25.10 NONOCCLUSIVE CORONARY ATHEROSCLEROSIS OF NATIVE CORONARY ARTERY: ICD-10-CM

## 2021-02-15 RX ORDER — SIMVASTATIN 10 MG
TABLET ORAL
Qty: 90 TABLET | Refills: 0 | Status: SHIPPED | OUTPATIENT
Start: 2021-02-15 | End: 2021-03-17 | Stop reason: SDUPTHER

## 2021-02-15 NOTE — TELEPHONE ENCOUNTER
Medication:   Requested Prescriptions     Pending Prescriptions Disp Refills    simvastatin (ZOCOR) 10 MG tablet [Pharmacy Med Name: SIMVASTATIN 10 MG TABLET] 30 tablet 0     Sig: TAKE ONE TABLET BY MOUTH ONCE NIGHTLY     Last Filled:  01/15/21    Last appt: 1/15/2021   Next appt: Visit date not found    Last OARRS: No flowsheet data found.

## 2021-02-22 DIAGNOSIS — E03.9 ACQUIRED HYPOTHYROIDISM: Primary | ICD-10-CM

## 2021-02-22 NOTE — TELEPHONE ENCOUNTER
Medication:   Requested Prescriptions      No prescriptions requested or ordered in this encounter     Last Filled: 2.7.19    Last appt: 1/15/2021   Next appt: Visit date not found    Last OARRS: No flowsheet data found.

## 2021-02-22 NOTE — TELEPHONE ENCOUNTER
levothyroxine (SYNTHROID) 88 MCG tablet [[id number]]     Order Details  Dose: 88 mcg Route: Oral Frequency: DAILY   Dispense Quantity: -- Refills: --          Sig: Take 88 mcg by mouth daily         Start Date: 02/07/19       [facility] [address] [facility], [address] [place] [address] -  [phone]

## 2021-02-23 RX ORDER — LEVOTHYROXINE SODIUM 88 UG/1
88 TABLET ORAL DAILY
Qty: 90 TABLET | Refills: 2 | Status: SHIPPED | OUTPATIENT
Start: 2021-02-23 | End: 2021-11-12

## 2021-03-17 DIAGNOSIS — I25.10 NONOCCLUSIVE CORONARY ATHEROSCLEROSIS OF NATIVE CORONARY ARTERY: ICD-10-CM

## 2021-03-17 RX ORDER — ROSUVASTATIN CALCIUM 20 MG/1
TABLET, COATED ORAL
Qty: 90 TABLET | Refills: 2 | OUTPATIENT
Start: 2021-03-17

## 2021-03-17 NOTE — TELEPHONE ENCOUNTER
Medication:   Requested Prescriptions     Pending Prescriptions Disp Refills    simvastatin (ZOCOR) 10 MG tablet 90 tablet 0     Refused Prescriptions Disp Refills    rosuvastatin (CRESTOR) 20 MG tablet [Pharmacy Med Name: ROSUVASTATIN CALCIUM 20 MG TAB] 90 tablet 2     Sig: TAKE ONE TABLET BY MOUTH DAILY     Last Filled: 2.15.21    Last appt: 1/15/2021   Next appt: Visit date not found    Last OARRS: No flowsheet data found.

## 2021-03-18 RX ORDER — SIMVASTATIN 10 MG
TABLET ORAL
Qty: 90 TABLET | Refills: 1 | Status: SHIPPED
Start: 2021-03-18 | End: 2021-08-27 | Stop reason: SINTOL

## 2021-03-24 DIAGNOSIS — I10 ESSENTIAL HYPERTENSION: Primary | ICD-10-CM

## 2021-03-24 RX ORDER — METOPROLOL SUCCINATE 25 MG/1
12.5 TABLET, EXTENDED RELEASE ORAL DAILY
Qty: 30 TABLET | Refills: 3 | Status: SHIPPED | OUTPATIENT
Start: 2021-03-24 | End: 2021-11-12

## 2021-03-24 NOTE — TELEPHONE ENCOUNTER
Medication:   Requested Prescriptions     Pending Prescriptions Disp Refills    metoprolol succinate (TOPROL XL) 25 MG extended release tablet 30 tablet 3     Sig: Take 0.5 tablets by mouth daily     Last Filled: 7.28.20    Last appt: 1/15/2021   Next appt: Visit date not found    Last OARRS: No flowsheet data found.

## 2021-03-24 NOTE — TELEPHONE ENCOUNTER
Pt requesting refill for the following:    Medication:metoprolol succinate (TOPROL XL) 25 MG extended release tablet [[id number]]         Pharmacy Contact:  [address], [address] [place] [address]   Marie Ragsdale [address] [place] [address]   Phone:  [phone]  Fax:  [phone]

## 2021-07-15 DIAGNOSIS — F51.01 PRIMARY INSOMNIA: ICD-10-CM

## 2021-07-15 RX ORDER — TRAZODONE HYDROCHLORIDE 150 MG/1
TABLET ORAL
Qty: 30 TABLET | Refills: 0 | Status: SHIPPED | OUTPATIENT
Start: 2021-07-15 | End: 2021-07-23 | Stop reason: SDUPTHER

## 2021-07-15 NOTE — TELEPHONE ENCOUNTER
Medication:   Requested Prescriptions     Pending Prescriptions Disp Refills    traZODone (DESYREL) 150 MG tablet [Pharmacy Med Name: traZODone 150 MG TABLET] 90 tablet 0     Sig: TAKE ONE TABLET BY MOUTH ONCE NIGHTLY        Last Filled:      Patient Phone Number: [phone] (home) [phone] (work)    Last appt: 1/15/2021   Next appt: Visit date not found    Last OARRS: No flowsheet data found.     [address] Pharmacy:   [address] [place] [address]  Phone: [phone] Fax: [phone]

## 2021-07-23 ENCOUNTER — OFFICE VISIT (OUTPATIENT)
Dept: PRIMARY CARE CLINIC | Age: 61
End: 2021-07-23
Payer: COMMERCIAL

## 2021-07-23 VITALS
HEART RATE: 70 BPM | SYSTOLIC BLOOD PRESSURE: 128 MMHG | WEIGHT: 169 LBS | DIASTOLIC BLOOD PRESSURE: 82 MMHG | HEIGHT: 62 IN | BODY MASS INDEX: 31.1 KG/M2 | RESPIRATION RATE: 15 BRPM | TEMPERATURE: 97.7 F

## 2021-07-23 DIAGNOSIS — I25.10 NONOCCLUSIVE CORONARY ATHEROSCLEROSIS OF NATIVE CORONARY ARTERY: ICD-10-CM

## 2021-07-23 DIAGNOSIS — E03.9 ACQUIRED HYPOTHYROIDISM: ICD-10-CM

## 2021-07-23 DIAGNOSIS — M79.10 MYALGIA: ICD-10-CM

## 2021-07-23 DIAGNOSIS — R73.03 PREDIABETES: ICD-10-CM

## 2021-07-23 DIAGNOSIS — I10 ESSENTIAL HYPERTENSION: Primary | ICD-10-CM

## 2021-07-23 DIAGNOSIS — I10 ESSENTIAL HYPERTENSION: ICD-10-CM

## 2021-07-23 DIAGNOSIS — F51.01 PRIMARY INSOMNIA: ICD-10-CM

## 2021-07-23 LAB
A/G RATIO: 2.2 (ref 1.1–2.2)
ALBUMIN SERPL-MCNC: 4.6 G/DL (ref 3.4–5)
ALP BLD-CCNC: 111 U/L (ref 40–129)
ALT SERPL-CCNC: 16 U/L (ref 10–40)
ANION GAP SERPL CALCULATED.3IONS-SCNC: 12 MMOL/L (ref 3–16)
AST SERPL-CCNC: 15 U/L (ref 15–37)
BILIRUB SERPL-MCNC: 0.6 MG/DL (ref 0–1)
BUN BLDV-MCNC: 11 MG/DL (ref 7–20)
CALCIUM SERPL-MCNC: 9.4 MG/DL (ref 8.3–10.6)
CHLORIDE BLD-SCNC: 106 MMOL/L (ref 99–110)
CHOLESTEROL, TOTAL: 139 MG/DL (ref 0–199)
CO2: 27 MMOL/L (ref 21–32)
CREAT SERPL-MCNC: 0.8 MG/DL (ref 0.6–1.2)
ESTIMATED AVERAGE GLUCOSE: 116.9 MG/DL
GFR AFRICAN AMERICAN: >60
GFR NON-AFRICAN AMERICAN: >60
GLOBULIN: 2.1 G/DL
GLUCOSE BLD-MCNC: 108 MG/DL (ref 70–99)
HBA1C MFR BLD: 5.7 %
HDLC SERPL-MCNC: 62 MG/DL (ref 40–60)
LDL CHOLESTEROL CALCULATED: 62 MG/DL
POTASSIUM SERPL-SCNC: 4.5 MMOL/L (ref 3.5–5.1)
SODIUM BLD-SCNC: 145 MMOL/L (ref 136–145)
TOTAL CK: 268 U/L (ref 26–192)
TOTAL PROTEIN: 6.7 G/DL (ref 6.4–8.2)
TRIGL SERPL-MCNC: 73 MG/DL (ref 0–150)
TSH SERPL DL<=0.05 MIU/L-ACNC: 1.64 UIU/ML (ref 0.27–4.2)
VLDLC SERPL CALC-MCNC: 15 MG/DL

## 2021-07-23 PROCEDURE — 99214 OFFICE O/P EST MOD 30 MIN: CPT | Performed by: INTERNAL MEDICINE

## 2021-07-23 RX ORDER — TRAZODONE HYDROCHLORIDE 150 MG/1
TABLET ORAL
Qty: 90 TABLET | Refills: 1 | Status: SHIPPED | OUTPATIENT
Start: 2021-07-23 | End: 2022-02-15 | Stop reason: SDUPTHER

## 2021-07-23 RX ORDER — MELOXICAM 7.5 MG/1
7.5 TABLET ORAL DAILY
Qty: 30 TABLET | Refills: 0 | Status: SHIPPED | OUTPATIENT
Start: 2021-07-23 | End: 2021-08-27 | Stop reason: SDUPTHER

## 2021-07-23 RX ORDER — AMLODIPINE BESYLATE 5 MG/1
TABLET ORAL
Qty: 90 TABLET | Refills: 1 | Status: SHIPPED | OUTPATIENT
Start: 2021-07-23 | End: 2022-02-09

## 2021-07-23 SDOH — ECONOMIC STABILITY: FOOD INSECURITY: WITHIN THE PAST 12 MONTHS, YOU WORRIED THAT YOUR FOOD WOULD RUN OUT BEFORE YOU GOT MONEY TO BUY MORE.: NEVER TRUE

## 2021-07-23 SDOH — ECONOMIC STABILITY: HOUSING INSECURITY: IN THE LAST 12 MONTHS, HOW MANY PLACES HAVE YOU LIVED?: 1

## 2021-07-23 SDOH — ECONOMIC STABILITY: INCOME INSECURITY: IN THE LAST 12 MONTHS, WAS THERE A TIME WHEN YOU WERE NOT ABLE TO PAY THE MORTGAGE OR RENT ON TIME?: NO

## 2021-07-23 SDOH — ECONOMIC STABILITY: HOUSING INSECURITY
IN THE LAST 12 MONTHS, WAS THERE A TIME WHEN YOU DID NOT HAVE A STEADY PLACE TO SLEEP OR SLEPT IN A SHELTER (INCLUDING NOW)?: NO

## 2021-07-23 SDOH — HEALTH STABILITY: PHYSICAL HEALTH: ON AVERAGE, HOW MANY DAYS PER WEEK DO YOU ENGAGE IN MODERATE TO STRENUOUS EXERCISE (LIKE A BRISK WALK)?: 3 DAYS

## 2021-07-23 SDOH — ECONOMIC STABILITY: FOOD INSECURITY: WITHIN THE PAST 12 MONTHS, THE FOOD YOU BOUGHT JUST DIDN'T LAST AND YOU DIDN'T HAVE MONEY TO GET MORE.: NEVER TRUE

## 2021-07-23 SDOH — HEALTH STABILITY: PHYSICAL HEALTH: ON AVERAGE, HOW MANY MINUTES DO YOU ENGAGE IN EXERCISE AT THIS LEVEL?: 30 MIN

## 2021-07-23 ASSESSMENT — SOCIAL DETERMINANTS OF HEALTH (SDOH)
DO YOU BELONG TO ANY CLUBS OR ORGANIZATIONS SUCH AS CHURCH GROUPS UNIONS, FRATERNAL OR ATHLETIC GROUPS, OR SCHOOL GROUPS?: NO
ARE YOU MARRIED, WIDOWED, DIVORCED, SEPARATED, NEVER MARRIED, OR LIVING WITH A PARTNER?: NEVER MARRIED
HOW HARD IS IT FOR YOU TO PAY FOR THE VERY BASICS LIKE FOOD, HOUSING, MEDICAL CARE, AND HEATING?: NOT HARD AT ALL
HOW OFTEN DO YOU ATTENT MEETINGS OF THE CLUB OR ORGANIZATION YOU BELONG TO?: NEVER
HOW OFTEN DO YOU ATTEND CHURCH OR RELIGIOUS SERVICES?: NEVER
IN A TYPICAL WEEK, HOW MANY TIMES DO YOU TALK ON THE PHONE WITH FAMILY, FRIENDS, OR NEIGHBORS?: MORE THAN THREE TIMES A WEEK
HOW OFTEN DO YOU GET TOGETHER WITH FRIENDS OR RELATIVES?: ONCE A WEEK

## 2021-07-23 ASSESSMENT — LIFESTYLE VARIABLES
HOW MANY STANDARD DRINKS CONTAINING ALCOHOL DO YOU HAVE ON A TYPICAL DAY: 1 OR 2
HOW OFTEN DO YOU HAVE A DRINK CONTAINING ALCOHOL: MONTHLY OR LESS

## 2021-07-23 NOTE — PROGRESS NOTES
Aleah Sommer   Date ofBirth:  1960    Date of Visit:  7/23/2021    Chief Complaint   Patient presents with    Hypertension    Insomnia    Hypothyroidism    Other     prediabetes       HPI  Patient has hypertension. Patient takes Amlodipine 5mg po q day and Metoprolol ER 25mg 1/2 po q day. Patient decreases salt. Patient is not exercising. Patient has insomnia. Patient takes Trazodone 150mg po qhs. Patient states she is able to fall asleep. Patient states she thinks her hot flashes keep her awake. Patient has hypothyroidism. Patient takes Levothyroxine 88mcg po q day. Patient denies fatigue, weight gain, constipation, dry skin, cold intolerance, and hair thinning. Patient has prediabetes. Patient states she has cut out soda and red meat. Patient does low carbohydrate diet. Patient has coronary artery disease. Patient takes aspirin 81mg po q day and Simvastatin 10mg nightly. Patient complains of  aches all over legs and arms for long time. Patient has seen Rheumatology before. Patient states the aches started before she started taking cholesterol lowering medication. Wt Readings from Last 3 Encounters:   07/23/21 169 lb (76.7 kg)   01/15/21 173 lb (78.5 kg)   08/10/20 175 lb 3.2 oz (79.5 kg)       Review of Systems   Constitutional: Negative for activity change, chills, fatigue, fever and unexpected weight change. HENT: Negative for congestion, postnasal drip, rhinorrhea, sinus pressure, sore throat and trouble swallowing. Eyes: Negative for visual disturbance. Respiratory: Negative for cough, chest tightness, shortness of breath and wheezing. Cardiovascular: Negative for chest pain, palpitations and leg swelling. Gastrointestinal: Negative for abdominal pain, blood in stool, constipation, diarrhea, nausea and vomiting. Endocrine: Negative for cold intolerance and heat intolerance. Genitourinary: Negative for dysuria, frequency and hematuria.    Musculoskeletal: Cardiovascular:      Rate and Rhythm: Normal rate and regular rhythm. Heart sounds: Normal heart sounds, S1 normal and S2 normal. No murmur heard. No friction rub. No gallop. Pulmonary:      Effort: Pulmonary effort is normal. No respiratory distress. Breath sounds: Normal breath sounds. No wheezing, rhonchi or rales. Abdominal:      General: Bowel sounds are normal. There is no distension. Palpations: Abdomen is soft. Tenderness: There is no abdominal tenderness. Musculoskeletal:      Right forearm: No tenderness. Left forearm: No tenderness. Cervical back: Neck supple. Right lower leg: No tenderness. No edema. Left lower leg: No tenderness. No edema. Lymphadenopathy:      Head:      Right side of head: No submandibular adenopathy. Left side of head: No submandibular adenopathy. Neurological:      Mental Status: She is alert and oriented to person, place, and time. Psychiatric:         Mood and Affect: Mood normal.           No results found for this visit on 07/23/21. Lab Review   No visits with results within 6 Month(s) from this visit.    Latest known visit with results is:   Orders Only on 01/15/2021   Component Date Value    Sodium 01/15/2021 138     Potassium 01/15/2021 4.3     Chloride 01/15/2021 99     CO2 01/15/2021 29     Anion Gap 01/15/2021 10     Glucose 01/15/2021 108*    BUN 01/15/2021 14     CREATININE 01/15/2021 0.8     GFR Non- 01/15/2021 >60     GFR  01/15/2021 >60     Calcium 01/15/2021 9.6     Total Protein 01/15/2021 6.6     Albumin 01/15/2021 4.8     Albumin/Globulin Ratio 01/15/2021 2.7*    Total Bilirubin 01/15/2021 0.6     Alkaline Phosphatase 01/15/2021 118     ALT 01/15/2021 17     AST 01/15/2021 19     Globulin 01/15/2021 1.8     TSH 01/15/2021 2.70     Cholesterol, Total 01/15/2021 115     Triglycerides 01/15/2021 55     HDL 01/15/2021 65*    LDL Calculated 01/15/2021 39     VLDL Cholesterol Calcula* 01/15/2021 11     Hemoglobin A1C 01/15/2021 5.7     eAG 01/15/2021 116.9          Assessment/Plan     1. Essential hypertension  -stable  -Continue same medications  -Low sodium diet  -Regular aerobic exercise  -Low sodium diet  -Regular aerobic exercise  - Comprehensive Metabolic Panel; Future  - amLODIPine (NORVASC) 5 MG tablet; TAKE ONE TABLET BY MOUTH DAILY  Dispense: 90 tablet; Refill: 1    2. Primary insomnia  - stable  - Continue traZODone (DESYREL) 150 MG tablet; TAKE ONE TABLET BY MOUTH ONCE NIGHTLY  Dispense: 90 tablet; Refill: 1    3. Acquired hypothyroidism  -stable  -Continue same medications  - TSH without Reflex; Future    4. Prediabetes  -Low carbohydrate diet  -Regular aerobic exercise  -Hemoglobin A1C; Future    5. Nonocclusive coronary atherosclerosis of native coronary artery  -stable  -Continue same medications  -Lipid Panel; Future    6. Myalgia  - CK; Future  - meloxicam (MOBIC) 7.5 MG tablet; Take 1 tablet by mouth daily  Dispense: 30 tablet; Refill: 0      Discussed medications with patient, who voiced understanding of their use and indications. All questions answered. Return in about 4 weeks (around 8/20/2021) for myalgia.

## 2021-07-25 ASSESSMENT — ENCOUNTER SYMPTOMS
WHEEZING: 0
VOMITING: 0
SORE THROAT: 0
ABDOMINAL PAIN: 0
COUGH: 0
DIARRHEA: 0
BLOOD IN STOOL: 0
TROUBLE SWALLOWING: 0
NAUSEA: 0
SHORTNESS OF BREATH: 0
CONSTIPATION: 0
SINUS PRESSURE: 0
RHINORRHEA: 0
CHEST TIGHTNESS: 0

## 2021-08-24 ENCOUNTER — NURSE TRIAGE (OUTPATIENT)
Dept: OTHER | Facility: CLINIC | Age: 61
End: 2021-08-24

## 2021-08-24 NOTE — TELEPHONE ENCOUNTER
Received call from Sal at [facility] with Red Flag Complaint. Brief description of triage: Left shoulder pain that comes and goes. PCP diagnosed her with arthritis. Caller not taking the medications that were ordered. Triage indicates for patient to see PCP today or tomorrow. Caller advised that she will wait until her scheduled appointment on Friday. Care advice provided, patient verbalizes understanding; denies any other questions or concerns; instructed to call back for any new or worsening symptoms. Attention Provider: Thank you for allowing me to participate in the care of your patient. The patient was connected to triage in response to information provided to the [facility]. Please do not respond through this encounter as the response is not directed to a shared pool. Reason for Disposition   Patient wants to be seen    Answer Assessment - Initial Assessment Questions  1. ONSET: \"When did the pain start?\"      1 week    2. LOCATION: \"Where is the pain located? \"      Left shoulder    3. PAIN: \"How bad is the pain? \" (Scale 1-10; or mild, moderate, severe)    - MILD (1-3): doesn't interfere with normal activities    - MODERATE (4-7): interferes with normal activities (e.g., work or school) or awakens from sleep    - SEVERE (8-10): excruciating pain, unable to do any normal activities, unable to move arm at all due to pain      5 moderate    4. WORK OR EXERCISE: \"Has there been any recent work or exercise that involved this part of the body? \"      Possible over use . Unsure    5. CAUSE: \"What do you think is causing the shoulder pain? \"      Unsure    6. OTHER SYMPTOMS: \"Do you have any other symptoms? \" (e.g., neck pain, swelling, rash, fever, numbness, weakness)      Denies    7. PREGNANCY: \"Is there any chance you are pregnant? \" \"When was your last menstrual period? \"      no    Protocols used: SHOULDER PAIN-ADULT-[place]

## 2021-08-27 ENCOUNTER — HOSPITAL ENCOUNTER (OUTPATIENT)
Dept: GENERAL RADIOLOGY | Age: 61
Discharge: HOME | End: 2021-08-27
Payer: COMMERCIAL

## 2021-08-27 ENCOUNTER — OFFICE VISIT (OUTPATIENT)
Dept: PRIMARY CARE CLINIC | Age: 61
End: 2021-08-27
Payer: COMMERCIAL

## 2021-08-27 VITALS
SYSTOLIC BLOOD PRESSURE: 124 MMHG | HEART RATE: 57 BPM | TEMPERATURE: 97.2 F | BODY MASS INDEX: 31.28 KG/M2 | WEIGHT: 171 LBS | DIASTOLIC BLOOD PRESSURE: 78 MMHG | RESPIRATION RATE: 16 BRPM | OXYGEN SATURATION: 97 %

## 2021-08-27 DIAGNOSIS — K11.20 SIALADENITIS: ICD-10-CM

## 2021-08-27 DIAGNOSIS — M79.10 MYALGIA: Primary | ICD-10-CM

## 2021-08-27 DIAGNOSIS — R74.8 ELEVATED CK: ICD-10-CM

## 2021-08-27 DIAGNOSIS — M25.512 LEFT SHOULDER PAIN, UNSPECIFIED CHRONICITY: ICD-10-CM

## 2021-08-27 PROCEDURE — 99214 OFFICE O/P EST MOD 30 MIN: CPT | Performed by: INTERNAL MEDICINE

## 2021-08-27 PROCEDURE — 73030 X-RAY EXAM OF SHOULDER: CPT

## 2021-08-27 RX ORDER — MELOXICAM 7.5 MG/1
7.5 TABLET ORAL DAILY
Qty: 30 TABLET | Refills: 1 | Status: SHIPPED | OUTPATIENT
Start: 2021-08-27 | End: 2022-02-15 | Stop reason: ALTCHOICE

## 2021-08-27 RX ORDER — LOSARTAN POTASSIUM 50 MG/1
50 TABLET ORAL DAILY
COMMUNITY
Start: 2021-08-27 | End: 2022-10-21 | Stop reason: SDUPTHER

## 2021-08-27 ASSESSMENT — ENCOUNTER SYMPTOMS
NAUSEA: 0
CONSTIPATION: 0
RHINORRHEA: 0
SHORTNESS OF BREATH: 0
COUGH: 0
SINUS PRESSURE: 0
CHEST TIGHTNESS: 0
WHEEZING: 0
SORE THROAT: 0
ABDOMINAL PAIN: 0
DIARRHEA: 0
BLOOD IN STOOL: 0
VOMITING: 0

## 2021-08-27 NOTE — PATIENT INSTRUCTIONS
Diagnosis Orders   1. Myalgia  meloxicam (MOBIC) 7.5 MG tablet   2. Left shoulder pain, unspecified chronicity  XR SHOULDER LEFT (MIN 2 VIEWS)    Ambulatory referral to Orthopedic Surgery   3. Sialadenitis  External Referral To ENT   4.  Elevated CK  CK         Discontinue Simvastatin and call in 30 days with update regarding muscle pain

## 2021-08-27 NOTE — PROGRESS NOTES
Uziel Castillo   Date ofBirth:  1960    Date of Visit:  8/27/2021    Chief Complaint   Patient presents with    Other     Follow up for myalgia. patient never took the medication prescribed    Shoulder Pain     Left shoulder, started a couple weeks ago, comes and goes, certain movements hurt worse than others    Mass     Left neck, noticed this just a few days ago it goes away but then comes back, a little painful, when it is there pain radiates down neck into shoulder       HPI  Patient has myalgias. Patient states she never took Meloxicam. Patient states her muscle pain is much better since starting Vitamin D. Patient states she does have pain when she takes Simvastatin. Patient had labs done which show elevated CK. Patient complains of 2 weeks history of left shoulder pain. Shoulder hurts to reach back. Patient states shoulder pain is sharp and intermittent. Patient denies injury. Patient states she lifts at work but has been doing that for years and no shoulder pain before. Patient states her left lymph node keeps swelling off and on and is tender and painful. Patient states she saw a Doctor at [facility] last year a couple of times and was told it was a stone in her gland and would work its way out. Patient had a CT neck with IV contrast done in November 2020. 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 Review of Systems   Constitutional: Negative for chills, fatigue and fever. HENT: Negative for congestion, postnasal drip, rhinorrhea, sinus pressure and sore throat. Eyes: Negative for visual disturbance. Respiratory: Negative for cough, chest tightness, shortness of breath and wheezing. Cardiovascular: Negative for chest pain, palpitations and leg swelling. Gastrointestinal: Negative for abdominal pain, blood in stool, constipation, diarrhea, nausea and vomiting. Genitourinary: Negative for dysuria, frequency and hematuria.    Musculoskeletal: Positive for arthralgias, myalgias and neck pain. Skin: Negative for rash. Neurological: Negative for dizziness, tremors, syncope, weakness, light-headedness, numbness and headaches. Hematological: Positive for adenopathy. Psychiatric/Behavioral: Negative for dysphoric mood and sleep disturbance. The patient is not nervous/anxious. No Known Allergies  Outpatient Medications Marked as Taking for the 8/27/21 encounter (Office Visit) with Nydia Jarquin MD   Medication Sig Dispense Refill    traZODone (DESYREL) 150 MG tablet TAKE ONE TABLET BY MOUTH ONCE NIGHTLY 90 tablet 1    amLODIPine (NORVASC) 5 MG tablet TAKE ONE TABLET BY MOUTH DAILY 90 tablet 1    metoprolol succinate (TOPROL XL) 25 MG extended release tablet Take 0.5 tablets by mouth daily 30 tablet 3    simvastatin (ZOCOR) 10 MG tablet TAKE ONE TABLET BY MOUTH ONCE NIGHTLY 90 tablet 1    levothyroxine (SYNTHROID) 88 MCG tablet Take 1 tablet by mouth daily 90 tablet 2    aspirin 81 MG EC tablet Take 1 tablet by mouth daily 30 tablet 3    nitroGLYCERIN (NITROSTAT) 0.4 MG SL tablet up to max of 3 total doses. If no relief after 1 dose, call 911. 25 tablet 3    progesterone (PROMETRIUM) 200 MG capsule Take 200 mg by mouth nightly           Vitals:    08/27/21 0858   BP: 124/78   Pulse: 57   Resp: 16   Temp: 97.2 °F (36.2 °C)   SpO2: 97%   Weight: 171 lb (77.6 kg)     Body mass index is 31.28 kg/m². Physical Exam  Nursing note reviewed. Constitutional:       General: She is not in acute distress. Appearance: Normal appearance. She is well-developed. Eyes:      General: Lids are normal.      Extraocular Movements: Extraocular movements intact. Conjunctiva/sclera: Conjunctivae normal.      Pupils: Pupils are equal, round, and reactive to light. Neck:      Thyroid: No thyromegaly. Vascular: No carotid bruit. Cardiovascular:      Rate and Rhythm: Normal rate and regular rhythm.       Heart sounds: Normal heart sounds, S1 normal and S2 normal. No murmur heard. No friction rub. No gallop. Pulmonary:      Effort: Pulmonary effort is normal. No respiratory distress. Breath sounds: Normal breath sounds. No wheezing, rhonchi or rales. Abdominal:      General: Bowel sounds are normal. There is no distension. Palpations: Abdomen is soft. Tenderness: There is no abdominal tenderness. Musculoskeletal:      Left shoulder: Tenderness present. Decreased range of motion. Cervical back: Neck supple. Right lower leg: No edema. Left lower leg: No edema. Lymphadenopathy:      Head:      Right side of head: No submandibular adenopathy. Left side of head: Submandibular (tender) adenopathy present. Neurological:      Mental Status: She is alert and oriented to person, place, and time. Psychiatric:         Mood and Affect: Mood normal.           No results found for this visit on 08/27/21.   Lab Review   Orders Only on 07/23/2021   Component Date Value    Total CK 07/23/2021 268*    TSH 07/23/2021 1.64     Hemoglobin A1C 07/23/2021 5.7     eAG 07/23/2021 116.9     Cholesterol, Total 07/23/2021 139     Triglycerides 07/23/2021 73     HDL 07/23/2021 62*    LDL Calculated 07/23/2021 62     VLDL Cholesterol Calcula* 07/23/2021 15     Sodium 07/23/2021 145     Potassium 07/23/2021 4.5     Chloride 07/23/2021 106     CO2 07/23/2021 27     Anion Gap 07/23/2021 12     Glucose 07/23/2021 108*    BUN 07/23/2021 11     CREATININE 07/23/2021 0.8     GFR Non- 07/23/2021 >60     GFR  07/23/2021 >60     Calcium 07/23/2021 9.4     Total Protein 07/23/2021 6.7     Albumin 07/23/2021 4.6     Albumin/Globulin Ratio 07/23/2021 2.2     Total Bilirubin 07/23/2021 0.6     Alkaline Phosphatase 07/23/2021 111     ALT 07/23/2021 16     AST 07/23/2021 15     Globulin 07/23/2021 2.1      EXAM: CT NECK WITH IV CONTRAST       INDICATION: swollen submandibular glands, pain,     TECHNIQUE: Axial thin section CT images of the neck were obtained after  150 mL of IOHEXOL 350    MG IODINE/ML INTRAVENOUS SOLUTION administered intravenously . Sagittal and coronal 2D    multiplanar reconstructions were performed at the scanner.       COMPARISON: None available       FINDINGS:       Adequate diagnostic quality.       Nasopharynx: Symmetric with no mass. Normal torus tubarius, fossa of Rosenmuller, and adenoid    tonsillar tissue.        Suprahyoid neck: Mildly limited evaluation of the oral cavity secondary to streak artifact from    dental amalgam. Within these limitations, the tongue and tongue bases symmetric without    evidence of mass or abnormal nodular enhancement.       Infrahyoid neck: Normal larynx, hypopharynx, and supraglottis.        Lymph nodes: No adenopathy by size, number or morphologic criteria.       Salivary Glands: There is mild asymmetric enlargement of the right sublingual gland, without    adjacent inflammatory changes. There is mild distention of the left [place]'s duct, with a 2 mm    calculus in the mid segment (image 138) there is mild distention    of the ducts within the left submandibular gland, which is minimally enlarged in comparison to    the right. There are no significant inflammatory changes. The right submandibular gland and the    parotid glands are normal.       Thyroid: Atrophic appearance of the thyroid tissue without focal mass.       Brain: There is serpiginous enhancement in the paramedian left frontal lobe suspicious for DVA,    not well characterized on this study. Otherwise the intracranial contents are unremarkable.       Orbits, paranasal sinuses, mastoids: No acute orbital abnormality. Clear paranasal sinuses. Clear mastoid air cells.       Vascular structures: Normal as included.       Thoracic inlet: Right upper lobe calcified granuloma with calcified hilar lymph nodes    consistent with remote granulomatous disease.  No suspicious opacity.        Bones: Moderate loss of disc space height C6-7 with endplate degenerative changes. No findings    to suggest high-grade canal stenosis. There is moderate to severe bilateral neural foraminal    stenosis C6-7 secondary to facet and uncovertebral osteophytes.       Soft tissues: 9 mm lesion in the left breast (series 3 image 7)       IMPRESSION:       1.  2 mm sialolith along the course of the mid left [place]'s duct, with mild distention of the    ducts within the left submandibular gland, without gross inflammatory changes to suggest    sialoadenitis.           Report Verified by: Huber Dawson DO at 11/13/2020 1:16 PM EST   EXAM: CT NECK WITH IV CONTRAST       INDICATION: swollen submandibular glands, pain,        TECHNIQUE: Axial thin section CT images of the neck were obtained after  150 mL of IOHEXOL 350    MG IODINE/ML INTRAVENOUS SOLUTION administered intravenously . Sagittal and coronal 2D    multiplanar reconstructions were performed at the scanner.       COMPARISON: None available       FINDINGS:       Adequate diagnostic quality.       Nasopharynx: Symmetric with no mass. Normal torus tubarius, fossa of Rosenmuller, and adenoid    tonsillar tissue.        Suprahyoid neck: Mildly limited evaluation of the oral cavity secondary to streak artifact from    dental amalgam. Within these limitations, the tongue and tongue bases symmetric without    evidence of mass or abnormal nodular enhancement.       Infrahyoid neck: Normal larynx, hypopharynx, and supraglottis.        Lymph nodes: No adenopathy by size, number or morphologic criteria.       Salivary Glands: There is mild asymmetric enlargement of the right sublingual gland, without    adjacent inflammatory changes.  There is mild distention of the left [place]'s duct, with a 2 mm    calculus in the mid segment (image 138) there is mild distention of the ducts within the left    submandibular gland, which is minimally enlarged in comparison to the right. There are no    significant inflammatory changes. The right submandibular gland and the parotid glands are    normal.       Thyroid: Atrophic appearance of the thyroid tissue without focal mass.       Brain: There is serpiginous enhancement in the paramedian left frontal lobe suspicious for DVA,    not well characterized on this study. Otherwise the intracranial contents are unremarkable.       Orbits, paranasal sinuses, mastoids: No acute orbital abnormality. Clear paranasal sinuses. Clear mastoid air cells.       Vascular structures: Normal as included.       Thoracic inlet: Right upper lobe calcified granuloma with calcified hilar lymph nodes    consistent with remote granulomatous disease. No suspicious opacity.        Bones: Moderate loss of disc space height C6-7 with endplate degenerative changes. No findings    to suggest high-grade canal stenosis. There is moderate to severe bilateral neural foraminal    stenosis C6-7 secondary to facet and uncovertebral osteophytes.       Soft tissues: 9 mm lesion in the left breast (series 3 image 7)       150   IMPRESSION:       1.  2 mm sialolith along the course of the mid left [place]'s duct, with mild distention of the    ducts within the left submandibular gland, without gross inflammatory changes to suggest    sialoadenitis.           Report Verified by: Cara Hansen DO at 11/13/2020 1:16 PM EST   150                 Assessment/Plan     1. Myalgia  -CK is elevated  - Discontinue simvastatin due to elevated CK and call in 30 days with update regarding muscle pain  -Start meloxicam (MOBIC) 7.5 MG tablet; Take 1 tablet by mouth daily  Dispense: 30 tablet; Refill: 1    2. Left shoulder pain, unspecified chronicity  -Start meloxicam (MOBIC) 7.5 MG tablet; Take 1 tablet by mouth daily  Dispense: 30 tablet; Refill: 1  - XR SHOULDER LEFT (MIN 2 VIEWS); Future  - Ambulatory referral to Orthopedic Surgery    3.  Sialadenitis  -Start meloxicam (MOBIC) 7.5 MG

## 2021-10-01 ENCOUNTER — TELEPHONE (OUTPATIENT)
Dept: PRIMARY CARE CLINIC | Age: 61
End: 2021-10-01

## 2021-10-01 DIAGNOSIS — I25.10 NONOCCLUSIVE CORONARY ATHEROSCLEROSIS OF NATIVE CORONARY ARTERY: Primary | ICD-10-CM

## 2021-10-01 NOTE — TELEPHONE ENCOUNTER
Pt states she was instructed to call back with update after Discontinue simvastatin due to elevated CK and call in 30 days   Pt states she is no longer having any muscle pain.  n

## 2021-10-05 RX ORDER — PRAVASTATIN SODIUM 10 MG
10 TABLET ORAL EVERY OTHER DAY
Qty: 15 TABLET | Refills: 0 | Status: SHIPPED | OUTPATIENT
Start: 2021-10-05 | End: 2021-11-08

## 2021-10-05 NOTE — TELEPHONE ENCOUNTER
Call patient. Her muscle pain was most likely due to Simvastatin since it resolved with being off Simvastatin. Unfortunately she will need to go back on a statin type cholesterol medication due to her heart disease. I recommend trying Pravastatin 10mg every other night for 30 days and if tolerated increase to nightly. I sent a prescription for Pravastatin to her pharmacy for 30 days. She can let me know before 30 days if she tolerates it so I can send a refill.

## 2021-10-08 NOTE — TELEPHONE ENCOUNTER
Call patient. Statin type cholesterol medications reduce your cholesterol to decrease risk of heart attack and stroke. If you have cardiovascular disease and do not take a statin you are at higher risk of having a heart attack or stroke.

## 2021-10-15 ENCOUNTER — OFFICE VISIT (OUTPATIENT)
Dept: ORTHOPEDIC SURGERY | Age: 61
End: 2021-10-15
Payer: COMMERCIAL

## 2021-10-15 VITALS — WEIGHT: 170 LBS | TEMPERATURE: 98.1 F | HEIGHT: 62 IN | BODY MASS INDEX: 31.28 KG/M2

## 2021-10-15 DIAGNOSIS — M75.82 TENDINITIS OF LEFT ROTATOR CUFF: Primary | ICD-10-CM

## 2021-10-15 PROCEDURE — 99204 OFFICE O/P NEW MOD 45 MIN: CPT | Performed by: ORTHOPAEDIC SURGERY

## 2021-10-15 NOTE — PROGRESS NOTES
Date:  10/15/2021    Name:  Indira Hatfield  Address:  [address] [place] [address] [address]    :  1960      Age:   59 y.o.    SSN:  [id number]      Medical Record Number:  [id number]    Reason for Visit:    Chief Complaint    New Patient (Left shoulder)      DOS:10/15/2021     HPI: Conchita Jha is a 59 y.o. female here today for evaluation of left shoulder pain that has been ongoing for a few months with no associated injury. Patient states that she has pain when she raises her arm all the way above her head, with picking objects up on front of her. Pain is primarily anterior. Denies any clicking catching locking or popping. Denies any numbness tingling. She has not had therapy for this just yet. Pain Assessment  Location of Pain: Shoulder  Location Modifiers: Left  Severity of Pain: 4  Quality of Pain: Aching  Frequency of Pain: Intermittent  Limiting Behavior: Some  Relieving Factors: Rest  Are there other pain locations you wish to document?: No  ROS: Review of systems reviewed from Patient History Form completed today and available in the patient's chart under the Media tab. Past Medical History:   Diagnosis Date    Acquired hypothyroidism 2019    Asthma     Essential hypertension 2019        Past Surgical History:   Procedure Laterality Date    DILATION AND CURETTAGE OF UTERUS         Family History   Problem Relation Age of Onset    Diabetes Mother     Hypertension Mother     Heart Attack Mother     Hypertension Father     Osteoarthritis Other     Stroke Other        Social History     Socioeconomic History    Marital status:      Spouse name: None    Number of children: None    Years of education: None    Highest education level: None   Occupational History    None   Tobacco Use    Smoking status: Never Smoker    Smokeless tobacco: Never Used   Vaping Use    Vaping Use: Never used   Substance and Sexual Activity    Alcohol use:  Yes Alcohol/week: 1.0 standard drinks     Types: 1 Glasses of wine per week    Drug use: Never    Sexual activity: Not Currently   Other Topics Concern    None   Social History Narrative    None     Social Determinants of Health     Financial Resource Strain: Low Risk     Difficulty of Paying Living Expenses: Not hard at all   Food Insecurity: No Food Insecurity    Worried About Running Out of Food in the Last Year: Never true    Clifford of Food in the Last Year: Never true   Transportation Needs: No Transportation Needs    Lack of Transportation (Medical): No    Lack of Transportation (Non-Medical):  No   Physical Activity: Insufficiently Active    Days of Exercise per Week: 3 days    Minutes of Exercise per Session: 30 min   Stress: No Stress Concern Present    Feeling of Stress : Not at all   Social Connections: Socially Isolated    Frequency of Communication with Friends and Family: More than three times a week    Frequency of Social Gatherings with Friends and Family: Once a week    Attends Druze Services: Never    Active Member of Clubs or Organizations: No    Attends Club or Organization Meetings: Never    Marital Status: Never    Intimate Partner Violence:     Fear of Current or Ex-Partner:     Emotionally Abused:     Physically Abused:     Sexually Abused:        Current Outpatient Medications   Medication Sig Dispense Refill    pravastatin (PRAVACHOL) 10 MG tablet Take 1 tablet by mouth every other day 15 tablet 0    meloxicam (MOBIC) 7.5 MG tablet Take 1 tablet by mouth daily 30 tablet 1    losartan (COZAAR) 50 MG tablet Take 1 tablet by mouth daily      traZODone (DESYREL) 150 MG tablet TAKE ONE TABLET BY MOUTH ONCE NIGHTLY 90 tablet 1    amLODIPine (NORVASC) 5 MG tablet TAKE ONE TABLET BY MOUTH DAILY 90 tablet 1    metoprolol succinate (TOPROL XL) 25 MG extended release tablet Take 0.5 tablets by mouth daily 30 tablet 3    levothyroxine (SYNTHROID) 88 MCG tablet Take 1 tablet by mouth daily 90 tablet 2    aspirin 81 MG EC tablet Take 1 tablet by mouth daily 30 tablet 3    nitroGLYCERIN (NITROSTAT) 0.4 MG SL tablet up to max of 3 total doses. If no relief after 1 dose, call 911. 25 tablet 3    progesterone (PROMETRIUM) 200 MG capsule Take 200 mg by mouth nightly       No current facility-administered medications for this visit. No Known Allergies    Vital signs:  Temp 98.1 °F (36.7 °C)   Ht 5' 2\" (1.575 m)   Wt 170 lb (77.1 kg)   BMI 31.09 kg/m²      Left shoulder examination:    Inspection: No abnormal swelling. No erythema. No induration. Palpation: Tenderness of the greater tuberosity. Acromioclavicular joint: Nontender to palpation. Range of Motion: Mild limitation of internal rotation. Strength: Mild supraspinatus muscle weakness secondary to pain. Instability: No anterior or posterior subluxation. Additional Tests: Positive impingement findings. Vascular: Skin warm well perfused. Neurologic: Sensation intact to light touch. Right comparison shoulder exam    Inspection:  Held in a normal posture. Normal contour at the acromioclavicular joint. No swelling, ecchymosis, or erythema about the shoulder. No atrophy appreciated. No scapular winging. Palpation:  No subacromial crepitus. No tenderness of the AC joint. No greater tuberosity tenderness. No tenderness in the bicipital groove. Range of Motion: Full passive and active ROM. Normal scapulothoracic rhythm. Strength:  Normal supraspinatus, infraspinatus, and subscapularis muscle strength. Stability: No anterior instability. No posterior instability. Special Tests: Impingement findings are negative. Labral findings are negative. Speed sign and Yergason signs are both negative. Crossover sign is negative. Belly press sign is negative. Lift off sign is negative. Other findings: The skin is warm dry and well perfused. Distally neurovascularly intact.  Sensation is intact to light touch over the deltoid. Diagnostics:  Radiology:       Pertinent imaging was obtained, interpreted, and reviewed with the patient today, images only - no report available. X-rays of the left shoulder including Grashey, scapular Y and axillary views were obtained and reviewed in office:    Impression: No acute fracture or dislocation. No osseous abnormalities. There is no appreciable soft tissue swelling or joint effusion. There are no lytic or blastic lesions. Office Procedures:  Orders Placed This Encounter   Procedures    Ambulatory referral to Physical Therapy     Referral Priority:   Routine     Referral Type:   Eval and Treat     Referral Reason:   Specialty Services Required     Number of Visits Requested:   1       Assessment: 59 y.o. female with rotator cuff tendonitis    Plan: Pertinent imaging was reviewed. The etiology, natural history, and treatment options for the disorder were discussed. The roles of activity medication, antiinflammatories, injections, bracing, physical therapy, and surgical interventions were all described to the patient and questions were answered. Patient has mild weakness associated with pain when cuff testing, she does have some limitation in internal rotation. I believe she has rotator cuff tendinitis. She is a candidate for formal supervised physical therapy for this. She would like to proceed with this. .     Follow-up in 1 month or sooner if worsening symptoms  Andres Glasgow is in agreement with this plan. All questions were answered to patient's satisfaction and was encouraged to call with any further questions. Total time spent for evaluation, education, and development of treatment plan: 45 minutes    Sybil Adam  10/15/2021    During this exam, Daniel KEBEDE PA-C, functioned as a scribe for Dr. Lydia Mason. The history taking and physical examination were performed by Dr. Lydia Mason. All counseling during the appointment was performed between the patient and Dr. Natasha Horne. 10/15/2021 10:34 AM    This dictation was performed with a verbal recognition program (DRAGON) and it was checked for errors. It is possible that there are still dictated errors within this office note. If so, please bring any areas to my attention for an addendum. All efforts were made to ensure that this office note is accurate. I attest that I met personally with the patient, performed the described exam, reviewed the radiographic studies and medical records associated with this patient and supervised the services that are described above.      Nia Enrique MD

## 2021-11-06 DIAGNOSIS — I25.10 NONOCCLUSIVE CORONARY ATHEROSCLEROSIS OF NATIVE CORONARY ARTERY: ICD-10-CM

## 2021-11-08 RX ORDER — PRAVASTATIN SODIUM 10 MG
TABLET ORAL
Qty: 15 TABLET | Refills: 2 | Status: SHIPPED | OUTPATIENT
Start: 2021-11-08 | End: 2022-02-15 | Stop reason: SDUPTHER

## 2021-11-08 NOTE — TELEPHONE ENCOUNTER
Medication:   Requested Prescriptions     Pending Prescriptions Disp Refills    pravastatin (PRAVACHOL) 10 MG tablet [Pharmacy Med Name: PRAVASTATIN SODIUM 10 MG TAB] 15 tablet 0     Sig: TAKE ONE TABLET BY MOUTH EVERY OTHER DAY     Last Filled: 10.5.21    Last appt: 8/27/2021   Next appt: 12/3/2021    Last OARRS: No flowsheet data found.

## 2021-11-12 DIAGNOSIS — E03.9 ACQUIRED HYPOTHYROIDISM: ICD-10-CM

## 2021-11-12 DIAGNOSIS — I10 ESSENTIAL HYPERTENSION: ICD-10-CM

## 2021-11-12 RX ORDER — LEVOTHYROXINE SODIUM 88 UG/1
TABLET ORAL
Qty: 90 TABLET | Refills: 0 | Status: SHIPPED | OUTPATIENT
Start: 2021-11-12 | End: 2022-02-09

## 2021-11-12 RX ORDER — METOPROLOL SUCCINATE 25 MG/1
TABLET, EXTENDED RELEASE ORAL
Qty: 45 TABLET | Refills: 0 | Status: SHIPPED | OUTPATIENT
Start: 2021-11-12 | End: 2022-02-09

## 2021-11-12 NOTE — TELEPHONE ENCOUNTER
Medication:   Requested Prescriptions     Pending Prescriptions Disp Refills    levothyroxine (SYNTHROID) 88 MCG tablet [Pharmacy Med Name: LEVOTHYROXINE 88 MCG TABLET] 90 tablet 2     Sig: TAKE ONE TABLET BY MOUTH DAILY    metoprolol succinate (TOPROL XL) 25 MG extended release tablet [Pharmacy Med Name: METOPROLOL SUCC ER 25 MG TAB] 15 tablet      Sig: TAKE 1/2 TABLET BY MOUTH DAILY       Last appt: 8/27/2021   Next appt: 12/3/2021    Last Thyroid:   Lab Results   Component Value Date    TSH 1.64 07/23/2021    D7BUMYF <0.10 08/17/2010    B0BJFCU <0.10 08/17/2010    T4FREE 1.5 05/18/2020    Y8WVZMB 0.6 08/17/2010

## 2022-01-28 LAB — PAP SMEAR, EXTERNAL: NORMAL

## 2022-02-07 DIAGNOSIS — I10 ESSENTIAL HYPERTENSION: ICD-10-CM

## 2022-02-07 DIAGNOSIS — E03.9 ACQUIRED HYPOTHYROIDISM: ICD-10-CM

## 2022-02-07 NOTE — TELEPHONE ENCOUNTER
Medication:   Requested Prescriptions     Pending Prescriptions Disp Refills    amLODIPine (NORVASC) 5 MG tablet [Pharmacy Med Name: amLODIPine BESYLATE 5 MG TAB] 90 tablet 1     Sig: TAKE ONE TABLET BY MOUTH DAILY    levothyroxine (SYNTHROID) 88 MCG tablet [Pharmacy Med Name: LEVOTHYROXINE 88 MCG TABLET] 90 tablet 0     Sig: TAKE ONE TABLET BY MOUTH DAILY    metoprolol succinate (TOPROL XL) 25 MG extended release tablet [Pharmacy Med Name: METOPROLOL SUCC ER 25 MG TAB] 45 tablet 0     Sig: TAKE 1/2 TABLET BY MOUTH DAILY       Last appt: 8/27/2021   Next appt: Visit date not found    Last Thyroid:   Lab Results   Component Value Date    TSH 1.64 07/23/2021    D8OUWKI <0.10 08/17/2010    V4DBXOH <0.10 08/17/2010    T4FREE 1.5 05/18/2020    O6EXBNJ 0.6 08/17/2010

## 2022-02-09 RX ORDER — METOPROLOL SUCCINATE 25 MG/1
TABLET, EXTENDED RELEASE ORAL
Qty: 45 TABLET | Refills: 0 | Status: SHIPPED | OUTPATIENT
Start: 2022-02-09 | End: 2022-05-10

## 2022-02-09 RX ORDER — LEVOTHYROXINE SODIUM 88 UG/1
TABLET ORAL
Qty: 90 TABLET | Refills: 0 | Status: SHIPPED | OUTPATIENT
Start: 2022-02-09 | End: 2022-05-17

## 2022-02-09 RX ORDER — AMLODIPINE BESYLATE 5 MG/1
TABLET ORAL
Qty: 90 TABLET | Refills: 0 | Status: SHIPPED | OUTPATIENT
Start: 2022-02-09 | End: 2022-05-17

## 2022-02-15 ENCOUNTER — OFFICE VISIT (OUTPATIENT)
Dept: PRIMARY CARE CLINIC | Age: 62
End: 2022-02-15
Payer: COMMERCIAL

## 2022-02-15 VITALS
TEMPERATURE: 95.2 F | WEIGHT: 173.2 LBS | HEART RATE: 63 BPM | SYSTOLIC BLOOD PRESSURE: 128 MMHG | OXYGEN SATURATION: 100 % | RESPIRATION RATE: 16 BRPM | BODY MASS INDEX: 31.68 KG/M2 | DIASTOLIC BLOOD PRESSURE: 74 MMHG

## 2022-02-15 DIAGNOSIS — M25.572 LEFT ANKLE PAIN, UNSPECIFIED CHRONICITY: ICD-10-CM

## 2022-02-15 DIAGNOSIS — R73.03 PREDIABETES: ICD-10-CM

## 2022-02-15 DIAGNOSIS — I25.10 NONOCCLUSIVE CORONARY ATHEROSCLEROSIS OF NATIVE CORONARY ARTERY: ICD-10-CM

## 2022-02-15 DIAGNOSIS — R74.8 ELEVATED CK: ICD-10-CM

## 2022-02-15 DIAGNOSIS — E03.9 ACQUIRED HYPOTHYROIDISM: ICD-10-CM

## 2022-02-15 DIAGNOSIS — M25.472 LEFT ANKLE SWELLING: ICD-10-CM

## 2022-02-15 DIAGNOSIS — I10 ESSENTIAL HYPERTENSION: ICD-10-CM

## 2022-02-15 DIAGNOSIS — F51.01 PRIMARY INSOMNIA: ICD-10-CM

## 2022-02-15 DIAGNOSIS — I10 ESSENTIAL HYPERTENSION: Primary | ICD-10-CM

## 2022-02-15 DIAGNOSIS — M79.604 RIGHT LEG PAIN: ICD-10-CM

## 2022-02-15 LAB
A/G RATIO: 2.4 (ref 1.1–2.2)
ALBUMIN SERPL-MCNC: 4.5 G/DL (ref 3.4–5)
ALP BLD-CCNC: 98 U/L (ref 40–129)
ALT SERPL-CCNC: 13 U/L (ref 10–40)
ANION GAP SERPL CALCULATED.3IONS-SCNC: 13 MMOL/L (ref 3–16)
AST SERPL-CCNC: 13 U/L (ref 15–37)
BILIRUB SERPL-MCNC: 0.3 MG/DL (ref 0–1)
BUN BLDV-MCNC: 16 MG/DL (ref 7–20)
CALCIUM SERPL-MCNC: 9.2 MG/DL (ref 8.3–10.6)
CHLORIDE BLD-SCNC: 105 MMOL/L (ref 99–110)
CHOLESTEROL, TOTAL: 161 MG/DL (ref 0–199)
CO2: 24 MMOL/L (ref 21–32)
CREAT SERPL-MCNC: 0.7 MG/DL (ref 0.6–1.2)
GFR AFRICAN AMERICAN: >60
GFR NON-AFRICAN AMERICAN: >60
GLUCOSE BLD-MCNC: 110 MG/DL (ref 70–99)
HDLC SERPL-MCNC: 60 MG/DL (ref 40–60)
LDL CHOLESTEROL CALCULATED: 85 MG/DL
POTASSIUM SERPL-SCNC: 4.6 MMOL/L (ref 3.5–5.1)
SODIUM BLD-SCNC: 142 MMOL/L (ref 136–145)
TOTAL CK: 176 U/L (ref 26–192)
TOTAL PROTEIN: 6.4 G/DL (ref 6.4–8.2)
TRIGL SERPL-MCNC: 79 MG/DL (ref 0–150)
VLDLC SERPL CALC-MCNC: 16 MG/DL

## 2022-02-15 PROCEDURE — 99214 OFFICE O/P EST MOD 30 MIN: CPT | Performed by: INTERNAL MEDICINE

## 2022-02-15 RX ORDER — PRAVASTATIN SODIUM 10 MG
TABLET ORAL
Qty: 45 TABLET | Refills: 1 | Status: SHIPPED | OUTPATIENT
Start: 2022-02-15 | End: 2022-11-04 | Stop reason: SDUPTHER

## 2022-02-15 RX ORDER — TRAZODONE HYDROCHLORIDE 150 MG/1
TABLET ORAL
Qty: 90 TABLET | Refills: 1 | Status: SHIPPED | OUTPATIENT
Start: 2022-02-15 | End: 2022-10-20

## 2022-02-15 ASSESSMENT — PATIENT HEALTH QUESTIONNAIRE - PHQ9
1. LITTLE INTEREST OR PLEASURE IN DOING THINGS: 0
9. THOUGHTS THAT YOU WOULD BE BETTER OFF DEAD, OR OF HURTING YOURSELF: 0
SUM OF ALL RESPONSES TO PHQ9 QUESTIONS 1 & 2: 0
8. MOVING OR SPEAKING SO SLOWLY THAT OTHER PEOPLE COULD HAVE NOTICED. OR THE OPPOSITE, BEING SO FIGETY OR RESTLESS THAT YOU HAVE BEEN MOVING AROUND A LOT MORE THAN USUAL: 0
3. TROUBLE FALLING OR STAYING ASLEEP: 0
SUM OF ALL RESPONSES TO PHQ QUESTIONS 1-9: 0
2. FEELING DOWN, DEPRESSED OR HOPELESS: 0
6. FEELING BAD ABOUT YOURSELF - OR THAT YOU ARE A FAILURE OR HAVE LET YOURSELF OR YOUR FAMILY DOWN: 0
10. IF YOU CHECKED OFF ANY PROBLEMS, HOW DIFFICULT HAVE THESE PROBLEMS MADE IT FOR YOU TO DO YOUR WORK, TAKE CARE OF THINGS AT HOME, OR GET ALONG WITH OTHER PEOPLE: 0
SUM OF ALL RESPONSES TO PHQ QUESTIONS 1-9: 0
SUM OF ALL RESPONSES TO PHQ QUESTIONS 1-9: 0
7. TROUBLE CONCENTRATING ON THINGS, SUCH AS READING THE NEWSPAPER OR WATCHING TELEVISION: 0
4. FEELING TIRED OR HAVING LITTLE ENERGY: 0
5. POOR APPETITE OR OVEREATING: 0
SUM OF ALL RESPONSES TO PHQ QUESTIONS 1-9: 0

## 2022-02-15 NOTE — PROGRESS NOTES
Positive for arthralgias, joint swelling and myalgias. Skin: Negative for rash. Neurological: Negative for dizziness, tremors, syncope, weakness, light-headedness, numbness and headaches. Psychiatric/Behavioral: Positive for sleep disturbance. Negative for dysphoric mood. The patient is not nervous/anxious. No Known Allergies  Outpatient Medications Marked as Taking for the 2/15/22 encounter (Office Visit) with Bernda Correa MD   Medication Sig Dispense Refill    traZODone (DESYREL) 150 MG tablet TAKE ONE TABLET BY MOUTH ONCE NIGHTLY 90 tablet 1    pravastatin (PRAVACHOL) 10 MG tablet TAKE ONE TABLET BY MOUTH EVERY OTHER DAY 45 tablet 1    amLODIPine (NORVASC) 5 MG tablet TAKE ONE TABLET BY MOUTH DAILY 90 tablet 0    levothyroxine (SYNTHROID) 88 MCG tablet TAKE ONE TABLET BY MOUTH DAILY 90 tablet 0    metoprolol succinate (TOPROL XL) 25 MG extended release tablet TAKE 1/2 TABLET BY MOUTH DAILY 45 tablet 0    losartan (COZAAR) 50 MG tablet Take 1 tablet by mouth daily      aspirin 81 MG EC tablet Take 1 tablet by mouth daily 30 tablet 3    progesterone (PROMETRIUM) 200 MG capsule Take 200 mg by mouth nightly           Vitals:    02/15/22 0947   BP: 128/74   Pulse: 63   Resp: 16   Temp: 95.2 °F (35.1 °C)   SpO2: 100%   Weight: 173 lb 3.2 oz (78.6 kg)     Body mass index is 31.68 kg/m². Physical Exam  Nursing note reviewed. Constitutional:       General: She is not in acute distress. Appearance: Normal appearance. She is well-developed. Eyes:      General: Lids are normal.      Extraocular Movements: Extraocular movements intact. Conjunctiva/sclera: Conjunctivae normal.      Pupils: Pupils are equal, round, and reactive to light. Neck:      Thyroid: No thyromegaly. Vascular: No carotid bruit. Cardiovascular:      Rate and Rhythm: Normal rate and regular rhythm. Heart sounds: Normal heart sounds, S1 normal and S2 normal. No murmur heard. No friction rub.  No gallop. Pulmonary:      Effort: Pulmonary effort is normal. No respiratory distress. Breath sounds: Normal breath sounds. No wheezing, rhonchi or rales. Abdominal:      General: Bowel sounds are normal. There is no distension. Palpations: Abdomen is soft. Tenderness: There is no abdominal tenderness. Musculoskeletal:      Cervical back: Neck supple. Right lower leg: No tenderness. No edema. Left lower leg: No edema. Left ankle: Swelling (lateral ankle) present. No tenderness. Normal range of motion. Lymphadenopathy:      Head:      Right side of head: No submandibular adenopathy. Left side of head: No submandibular adenopathy. Neurological:      Mental Status: She is alert and oriented to person, place, and time. Psychiatric:         Mood and Affect: Mood normal.           No results found for this visit on 02/15/22. Lab Review   Orders Only on 12/16/2021   Component Date Value    CREATININE 12/16/2021 0.83     GFR, Estimated 12/16/2021 87     GFR, Estimated 12/16/2021 75    Orders Only on 08/27/2021   Component Date Value    Total CK 08/27/2021 273*         Assessment/Plan     1. Essential hypertension  -stable  -Continue Amlodipine 5mg once daily  -Continue Metoprolol ER 25mg 1/2 po q day  -Low sodium diet  -Regular aerobic exercise  - Comprehensive Metabolic Panel; Future    2. Acquired hypothyroidism  -stable  -Continue Levothyroxine 88mcg once daily    3. Primary insomnia  - stable  - continue traZODone (DESYREL) 150 MG tablet; TAKE ONE TABLET BY MOUTH ONCE NIGHTLY  Dispense: 90 tablet; Refill: 1    4. Prediabetes  -Low carbohydrate diet  -Regular aerobic exercise  -Hemoglobin A1C; Future    5. Nonocclusive coronary atherosclerosis of native coronary artery  - stable  - Lipid Panel; Future  - Continue pravastatin (PRAVACHOL) 10 MG tablet; TAKE ONE TABLET BY MOUTH EVERY OTHER DAY  Dispense: 45 tablet; Refill: 1  -continue care per Cardiology    6.  Elevated CK  - persistently mildly elevated CK  - CK; Future    7. Left ankle swelling  - Referral to Dirk Christensen MD, Orthopedic Surgery, [facility]    8. Left ankle pain, unspecified chronicity  -Tylenol or Advil as needed  - Referral to Dirk Christensen MD, Orthopedic Surgery, [facility]    9. Right leg pain  -chronic  -Tylenol or Advil as needed  - Referral to  Dirk Christensen MD, Orthopedic Surgery, [facility]      Discussed medications with patient, who voiced understanding of their use and indications. All questions answered.       Return in about 6 months (around 8/15/2022) for annual physical exam.

## 2022-02-16 DIAGNOSIS — F51.01 PRIMARY INSOMNIA: ICD-10-CM

## 2022-02-16 LAB
ESTIMATED AVERAGE GLUCOSE: 116.9 MG/DL
HBA1C MFR BLD: 5.7 %

## 2022-02-16 RX ORDER — TRAZODONE HYDROCHLORIDE 150 MG/1
TABLET ORAL
Qty: 90 TABLET | Refills: 1 | OUTPATIENT
Start: 2022-02-16

## 2022-02-24 PROBLEM — M79.604 RIGHT LEG PAIN: Status: ACTIVE | Noted: 2022-02-24

## 2022-02-24 PROBLEM — M25.472 LEFT ANKLE SWELLING: Status: ACTIVE | Noted: 2022-02-24

## 2022-02-24 ASSESSMENT — ENCOUNTER SYMPTOMS
BLOOD IN STOOL: 0
VOMITING: 0
TROUBLE SWALLOWING: 0
CONSTIPATION: 0
COUGH: 0
CHEST TIGHTNESS: 0
ABDOMINAL PAIN: 0
DIARRHEA: 0
SHORTNESS OF BREATH: 0
SINUS PRESSURE: 0
RHINORRHEA: 0
WHEEZING: 0
NAUSEA: 0
SORE THROAT: 0

## 2022-05-04 ENCOUNTER — TELEPHONE (OUTPATIENT)
Dept: PRIMARY CARE CLINIC | Age: 62
End: 2022-05-04

## 2022-05-04 DIAGNOSIS — M79.606 PAIN OF LOWER EXTREMITY, UNSPECIFIED LATERALITY: ICD-10-CM

## 2022-05-04 DIAGNOSIS — M79.641 PAIN IN BOTH HANDS: Primary | ICD-10-CM

## 2022-05-04 DIAGNOSIS — M79.642 PAIN IN BOTH HANDS: Primary | ICD-10-CM

## 2022-05-04 NOTE — TELEPHONE ENCOUNTER
Patient requesting referral for  Rheumatology for leg pain and Hand pain. Referal written and faxed patient notified.

## 2022-05-04 NOTE — TELEPHONE ENCOUNTER
----- Message from Ginette Reza sent at 5/4/2022  9:49 AM EDT -----  Subject: Referral Request    QUESTIONS   Reason for referral request? rheumatology   Has the physician seen you for this condition before? No   Preferred Specialist (if applicable)? Do you already have an appointment scheduled? No  [place] [id number] [place] [phone]  ---------------------------------------------------------------------------  --------------  Boone SALAZAR  What is the best way for the office to contact you? OK to leave message on   voicemail  Preferred Call Back Phone Number? [phone]  ---------------------------------------------------------------------------  --------------  SCRIPT ANSWERS  Relationship to Patient?  Self

## 2022-05-10 DIAGNOSIS — I10 ESSENTIAL HYPERTENSION: ICD-10-CM

## 2022-05-10 RX ORDER — METOPROLOL SUCCINATE 25 MG/1
TABLET, EXTENDED RELEASE ORAL
Qty: 45 TABLET | Refills: 1 | Status: SHIPPED | OUTPATIENT
Start: 2022-05-10 | End: 2022-05-17

## 2022-05-10 NOTE — TELEPHONE ENCOUNTER
Medication:   Requested Prescriptions     Pending Prescriptions Disp Refills    metoprolol succinate (TOPROL XL) 25 MG extended release tablet [Pharmacy Med Name: METOPROLOL SUCC ER 25 MG TAB] 45 tablet 0     Sig: TAKE 1/2 TABLET BY MOUTH DAILY     Last Filled: 2.9.22    Last appt: 2/15/2022   Next appt: 8/19/2022    Last OARRS: No flowsheet data found.

## 2022-05-13 ENCOUNTER — TELEPHONE (OUTPATIENT)
Dept: PRIMARY CARE CLINIC | Age: 62
End: 2022-05-13

## 2022-05-14 DIAGNOSIS — I10 ESSENTIAL HYPERTENSION: ICD-10-CM

## 2022-05-14 DIAGNOSIS — E03.9 ACQUIRED HYPOTHYROIDISM: ICD-10-CM

## 2022-05-16 NOTE — TELEPHONE ENCOUNTER
Medication:   Requested Prescriptions     Pending Prescriptions Disp Refills    metoprolol succinate (TOPROL XL) 25 MG extended release tablet [Pharmacy Med Name: METOPROLOL SUCC ER 25 MG TAB] 45 tablet 1     Sig: TAKE 1/2 TABLET BY MOUTH DAILY    amLODIPine (NORVASC) 5 MG tablet [Pharmacy Med Name: amLODIPine BESYLATE 5 MG TAB] 90 tablet 0     Sig: TAKE ONE TABLET BY MOUTH DAILY    levothyroxine (SYNTHROID) 88 MCG tablet [Pharmacy Med Name: LEVOTHYROXINE 88 MCG TABLET] 90 tablet 0     Sig: TAKE ONE TABLET BY MOUTH DAILY     Last Filled: 5.10.22 2.9.22 2.9.22    Last appt: 2/15/2022   Next appt: 8/19/2022    Last OARRS: No flowsheet data found.

## 2022-05-17 RX ORDER — LEVOTHYROXINE SODIUM 88 UG/1
TABLET ORAL
Qty: 90 TABLET | Refills: 1 | Status: SHIPPED | OUTPATIENT
Start: 2022-05-17 | End: 2022-10-20

## 2022-05-17 RX ORDER — METOPROLOL SUCCINATE 25 MG/1
TABLET, EXTENDED RELEASE ORAL
Qty: 45 TABLET | Refills: 1 | Status: SHIPPED | OUTPATIENT
Start: 2022-05-17 | End: 2022-11-04 | Stop reason: SDUPTHER

## 2022-05-17 RX ORDER — AMLODIPINE BESYLATE 5 MG/1
TABLET ORAL
Qty: 90 TABLET | Refills: 1 | Status: SHIPPED | OUTPATIENT
Start: 2022-05-17 | End: 2022-10-20

## 2022-06-27 ENCOUNTER — OFFICE VISIT (OUTPATIENT)
Dept: PRIMARY CARE CLINIC | Age: 62
End: 2022-06-27
Payer: COMMERCIAL

## 2022-06-27 ENCOUNTER — HOSPITAL ENCOUNTER (OUTPATIENT)
Dept: GENERAL RADIOLOGY | Age: 62
Discharge: HOME | End: 2022-06-27
Payer: COMMERCIAL

## 2022-06-27 ENCOUNTER — NURSE TRIAGE (OUTPATIENT)
Dept: OTHER | Facility: CLINIC | Age: 62
End: 2022-06-27

## 2022-06-27 VITALS
SYSTOLIC BLOOD PRESSURE: 132 MMHG | DIASTOLIC BLOOD PRESSURE: 86 MMHG | HEART RATE: 66 BPM | BODY MASS INDEX: 31.24 KG/M2 | RESPIRATION RATE: 16 BRPM | TEMPERATURE: 97 F | OXYGEN SATURATION: 98 % | WEIGHT: 170.8 LBS

## 2022-06-27 DIAGNOSIS — M79.622 LEFT UPPER ARM PAIN: ICD-10-CM

## 2022-06-27 DIAGNOSIS — M54.2 NECK PAIN: ICD-10-CM

## 2022-06-27 DIAGNOSIS — M25.512 LEFT SHOULDER PAIN, UNSPECIFIED CHRONICITY: Primary | ICD-10-CM

## 2022-06-27 DIAGNOSIS — M25.512 LEFT SHOULDER PAIN, UNSPECIFIED CHRONICITY: ICD-10-CM

## 2022-06-27 PROCEDURE — 73030 X-RAY EXAM OF SHOULDER: CPT

## 2022-06-27 PROCEDURE — 99214 OFFICE O/P EST MOD 30 MIN: CPT | Performed by: INTERNAL MEDICINE

## 2022-06-27 RX ORDER — METHOCARBAMOL 500 MG/1
500 TABLET, FILM COATED ORAL 3 TIMES DAILY PRN
Qty: 30 TABLET | Refills: 0 | Status: SHIPPED | OUTPATIENT
Start: 2022-06-27 | End: 2022-07-05 | Stop reason: SDUPTHER

## 2022-06-27 RX ORDER — METHYLPREDNISOLONE 4 MG/1
TABLET ORAL
Qty: 1 KIT | Refills: 0 | Status: SHIPPED | OUTPATIENT
Start: 2022-06-27 | End: 2022-07-03

## 2022-06-27 NOTE — Clinical Note
[address] [facility] [place] [address]  Phone: [phone]  Fax: [phone]    Erica García MD        June 27, 2022     Patient: Tigre Preciado   YOB: 1960   Date of Visit: 6/27/2022       To Whom It May Concern: It is my medical opinion that Batsheva Cho {Work release (duty restriction):27673}. If you have any questions or concerns, please don't hesitate to call.     Sincerely,        Erica García MD

## 2022-06-27 NOTE — PATIENT INSTRUCTIONS
1. Left shoulder pain, unspecified chronicity  - methylPREDNISolone (MEDROL DOSEPACK) 4 MG tablet; Take by mouth. Dispense: 1 kit; Refill: 0  - XR SHOULDER LEFT (MIN 2 VIEWS); Future  -Referral to  [place] TERR[facility] VALDES MD, Orthopedic Surgery ([address]), [facility]    2. Left upper arm pain  - methylPREDNISolone (MEDROL DOSEPACK) 4 MG tablet; Take by mouth. Dispense: 1 kit; Refill: 0  - Referral to [place] TERR[facility] VALDES MD, Orthopedic Surgery ([address]), [facility]    3. Neck pain  - methylPREDNISolone (MEDROL DOSEPACK) 4 MG tablet; Take by mouth. Dispense: 1 kit; Refill: 0  - methocarbamol (ROBAXIN) 500 MG tablet; Take 1 tablet by mouth 3 times daily as needed (pain)  Dispense: 30 tablet;  Refill: 0  - Referral to [place] TERR[facility] VALDES MD, Orthopedic Surgery ([address]), [facility]

## 2022-06-27 NOTE — LETTER
[facility]  [place] [address] [address] [place] [address]  Phone: [phone]  Fax: [phone]    Haley Fernandez MD        June 27, 2022     Patient: Jodi Alford   YOB: 1960   Date of Visit: 6/27/2022       To Whom It May Concern:    Please excuse Jayce Lawrence from work 6/27/22-6/30/22 due to a medical condition. If you have any questions or concerns, please don't hesitate to call.     Sincerely,        Haley Fernandez MD

## 2022-06-27 NOTE — PROGRESS NOTES
Kalen Guidry   Date ofBirth:  1960    Date of Visit:  6/27/2022    Chief Complaint   Patient presents with    Shoulder Pain     Left, starts at the shoudler radiates down to elbow, described as throbbing, happens when she leans forward or back. Started 6/25/22 morning. Been consistent since then     Neck Pain       HPI  Patient complains of left shoulder pain since Saturday morning. Shoulder pain is dull achy and constant. Patient states she has throbbing pain in her left arm and back of shoulder. Patient can't lift her arm up all the way. Patient states it hurts to move arm to back or front. Patient denies injury. Patient denies paresthesias. Shoulder pain is 8-9 out of 10 severity. Patient states she has to hold her arm with walking. Patient states she has neck pain with turning her head to the left. Neck pain is dull achy and intermittent. Patient denies spasms. Neck pain is 8-9 out of 10 severity. Patient states she took Aleve on Saturday and nothing since. Patient lifts boxes and stocks at work. Review of Systems   Constitutional: Negative for activity change, chills and fever. Respiratory: Negative for shortness of breath. Cardiovascular: Negative for chest pain. Gastrointestinal: Negative for abdominal pain, nausea and vomiting. Musculoskeletal: Positive for arthralgias, myalgias and neck pain. Negative for joint swelling and neck stiffness. Skin: Negative for color change and rash. Neurological: Negative for dizziness, weakness, light-headedness, numbness and headaches.        No Known Allergies  Outpatient Medications Marked as Taking for the 6/27/22 encounter (Office Visit) with Darlin Gomez MD   Medication Sig Dispense Refill    metoprolol succinate (TOPROL XL) 25 MG extended release tablet TAKE 1/2 TABLET BY MOUTH DAILY 45 tablet 1    amLODIPine (NORVASC) 5 MG tablet TAKE ONE TABLET BY MOUTH DAILY 90 tablet 1    levothyroxine (SYNTHROID) 88 MCG tablet TAKE ONE TABLET BY MOUTH DAILY 90 tablet 1    traZODone (DESYREL) 150 MG tablet TAKE ONE TABLET BY MOUTH ONCE NIGHTLY 90 tablet 1    pravastatin (PRAVACHOL) 10 MG tablet TAKE ONE TABLET BY MOUTH EVERY OTHER DAY 45 tablet 1    losartan (COZAAR) 50 MG tablet Take 1 tablet by mouth daily      aspirin 81 MG EC tablet Take 1 tablet by mouth daily 30 tablet 3    progesterone (PROMETRIUM) 200 MG capsule Take 200 mg by mouth nightly           Vitals:    06/27/22 1326   BP: 132/86   Pulse: 66   Resp: 16   Temp: 97 °F (36.1 °C)   SpO2: 98%   Weight: 170 lb 12.8 oz (77.5 kg)     Body mass index is 31.24 kg/m². Physical Exam  Vitals and nursing note reviewed. Constitutional:       General: She is not in acute distress. Appearance: Normal appearance. She is well-developed. HENT:      Head: Normocephalic and atraumatic. Eyes:      Extraocular Movements: Extraocular movements intact. Pupils: Pupils are equal, round, and reactive to light. Neck:      Thyroid: No thyromegaly. Cardiovascular:      Rate and Rhythm: Normal rate and regular rhythm. Heart sounds: S1 normal and S2 normal.   Pulmonary:      Effort: Pulmonary effort is normal.      Breath sounds: Normal breath sounds. Musculoskeletal:      Left shoulder: Tenderness present. No swelling. Decreased range of motion. Decreased strength. Left upper arm: Tenderness present. No swelling. Cervical back: Tenderness present. No swelling. Pain with movement present. Normal range of motion. Neurological:      Mental Status: She is alert. No results found for this visit on 06/27/22.   Lab Review   Orders Only on 02/15/2022   Component Date Value    Total CK 02/15/2022 176     Hemoglobin A1C 02/15/2022 5.7     eAG 02/15/2022 116.9     Cholesterol, Total 02/15/2022 161     Triglycerides 02/15/2022 79     HDL 02/15/2022 60     LDL Calculated 02/15/2022 85     VLDL Cholesterol Calcula* 02/15/2022 16     Sodium 02/15/2022 142  Potassium 02/15/2022 4.6     Chloride 02/15/2022 105     CO2 02/15/2022 24     Anion Gap 02/15/2022 13     Glucose 02/15/2022 110*    BUN 02/15/2022 16     CREATININE 02/15/2022 0.7     GFR Non- 02/15/2022 >60     GFR  02/15/2022 >60     Calcium 02/15/2022 9.2     Total Protein 02/15/2022 6.4     Albumin 02/15/2022 4.5     Albumin/Globulin Ratio 02/15/2022 2.4*    Total Bilirubin 02/15/2022 0.3     Alkaline Phosphatase 02/15/2022 98     ALT 02/15/2022 13     AST 02/15/2022 13*         Assessment/Plan     1. Left shoulder pain, unspecified chronicity  - methylPREDNISolone (MEDROL DOSEPACK) 4 MG tablet; Take by mouth. Dispense: 1 kit; Refill: 0  - Tylenol 650mg 3 times daily as needed  - XR SHOULDER LEFT (MIN 2 VIEWS); Future  -Referral to  [facility] MD LUCIO, Orthopedic Surgery (Primary Care Sports Medicine), [facility] work 6/27/22-6/30/22    2. Left upper arm pain  - methylPREDNISolone (MEDROL DOSEPACK) 4 MG tablet; Take by mouth. Dispense: 1 kit; Refill: 0  - Tylenol 650mg 3 times daily as needed  - Referral to [facility] MD LUCIO, Orthopedic Surgery (Primary [facility] Sports Medicine), [facility] work 6/27/22-6/30/22    3. Neck pain  - methylPREDNISolone (MEDROL DOSEPACK) 4 MG tablet; Take by mouth. Dispense: 1 kit; Refill: 0  - methocarbamol (ROBAXIN) 500 MG tablet; Take 1 tablet by mouth 3 times daily as needed (pain)  Dispense: 30 tablet; Refill: 0  - Referral to [facility] MD LUCIO, Orthopedic Surgery (Primary [facility] Sports Medicine), [facility] work 6/27/22-6/30/22          Discussed medications with patient, who voiced understanding of their use and indications. All questions answered. Return if symptoms worsen or fail to improve.

## 2022-06-28 PROBLEM — M79.622 LEFT UPPER ARM PAIN: Status: ACTIVE | Noted: 2022-06-28

## 2022-06-28 ASSESSMENT — ENCOUNTER SYMPTOMS
ABDOMINAL PAIN: 0
SHORTNESS OF BREATH: 0
COLOR CHANGE: 0
NAUSEA: 0
VOMITING: 0

## 2022-06-29 ENCOUNTER — OFFICE VISIT (OUTPATIENT)
Dept: ORTHOPEDIC SURGERY | Age: 62
End: 2022-06-29
Payer: COMMERCIAL

## 2022-06-29 VITALS — HEIGHT: 62 IN | BODY MASS INDEX: 31.44 KG/M2 | WEIGHT: 170.86 LBS

## 2022-06-29 DIAGNOSIS — M50.20 CERVICAL DISCOGENIC PAIN SYNDROME: ICD-10-CM

## 2022-06-29 DIAGNOSIS — M54.12 RADICULITIS OF LEFT CERVICAL REGION: ICD-10-CM

## 2022-06-29 DIAGNOSIS — M50.30 DDD (DEGENERATIVE DISC DISEASE), CERVICAL: ICD-10-CM

## 2022-06-29 DIAGNOSIS — M47.812 CERVICAL SPONDYLOSIS: ICD-10-CM

## 2022-06-29 PROCEDURE — 99203 OFFICE O/P NEW LOW 30 MIN: CPT | Performed by: INTERNAL MEDICINE

## 2022-06-29 RX ORDER — TRAMADOL HYDROCHLORIDE 50 MG/1
50 TABLET ORAL EVERY 6 HOURS PRN
Qty: 20 TABLET | Refills: 0 | Status: SHIPPED | OUTPATIENT
Start: 2022-06-29 | End: 2022-07-06

## 2022-06-29 SDOH — HEALTH STABILITY: PHYSICAL HEALTH: ON AVERAGE, HOW MANY DAYS PER WEEK DO YOU ENGAGE IN MODERATE TO STRENUOUS EXERCISE (LIKE A BRISK WALK)?: 0 DAYS

## 2022-06-29 NOTE — PROGRESS NOTES
Chief Complaint:   Chief Complaint   Patient presents with    Neck Pain     Cervical, achy and sore after sleeping or reaching/pulling    Shoulder Pain     L shoulder feels achy and sore. Radiates from neck to mid arm. History of Present Illness:       Patient is a 57 y.o. female presents with the above complaint. She is seen in consultation at request of Dr.Dyatra James Fu the symptoms began 4 daysago and started without an injury. She awakened from sleep unable to move her right arm related to pain. The patient describes a sharp, aching pain that does radiate. The symptoms are constant  and are are worsening since the onset. The neck pain is location: left sided  severity: 5 out of 10 and is worsened by flexion. The patient has not noted new onset or progressive weakness of the upper extremites. The neck pain : arm pain is 30 : 70 and follows aC7 dermatomal pattern. Treatment to date has included Medrol and muscle relaxant and symptoms have not improved with this treatment. The patient denies history of neck trauma. Their is not history or orthopaedic cervical spine surgery. Work up to date has included: CT which is outlined below. The patient has no prior history of autoimmune disease, inflammatory arthropathy or crystal arthropathy. Past Medical History:        Past Medical History:   Diagnosis Date    Acquired hypothyroidism 11/25/2019    Asthma     Essential hypertension 11/25/2019    Osteoarthritis          Past Surgical History:   Procedure Laterality Date    DILATION AND CURETTAGE OF UTERUS           Present Medications:         Current Outpatient Medications   Medication Sig Dispense Refill    diclofenac (VOLTAREN) 50 MG EC tablet Take 1 tablet by mouth 2 times daily 60 tablet 1    traMADol (ULTRAM) 50 MG tablet Take 1 tablet by mouth every 6 hours as needed for Pain for up to 7 days.  20 tablet 0    methylPREDNISolone (MEDROL DOSEPACK) 4 MG tablet Take by mouth. 1 kit 0    methocarbamol (ROBAXIN) 500 MG tablet Take 1 tablet by mouth 3 times daily as needed (pain) 30 tablet 0    metoprolol succinate (TOPROL XL) 25 MG extended release tablet TAKE 1/2 TABLET BY MOUTH DAILY 45 tablet 1    amLODIPine (NORVASC) 5 MG tablet TAKE ONE TABLET BY MOUTH DAILY 90 tablet 1    levothyroxine (SYNTHROID) 88 MCG tablet TAKE ONE TABLET BY MOUTH DAILY 90 tablet 1    traZODone (DESYREL) 150 MG tablet TAKE ONE TABLET BY MOUTH ONCE NIGHTLY 90 tablet 1    pravastatin (PRAVACHOL) 10 MG tablet TAKE ONE TABLET BY MOUTH EVERY OTHER DAY 45 tablet 1    losartan (COZAAR) 50 MG tablet Take 1 tablet by mouth daily      aspirin 81 MG EC tablet Take 1 tablet by mouth daily 30 tablet 3    progesterone (PROMETRIUM) 200 MG capsule Take 200 mg by mouth nightly       No current facility-administered medications for this visit. Allergies:      No Known Allergies     Social History:         Social History     Socioeconomic History    Marital status:      Spouse name: Not on file    Number of children: Not on file    Years of education: Not on file    Highest education level: Not on file   Occupational History    Not on file   Tobacco Use    Smoking status: Former Smoker     Packs/day: 0.00     Years: 0.00     Pack years: 0.00     Quit date:      Years since quittin.5    Smokeless tobacco: Never Used   Vaping Use    Vaping Use: Never used   Substance and Sexual Activity    Alcohol use:  Yes     Alcohol/week: 1.0 standard drink     Types: 1 Glasses of wine per week    Drug use: Never    Sexual activity: Not Currently   Other Topics Concern    Not on file   Social History Narrative    Not on file     Social Determinants of Health     Financial Resource Strain: Low Risk     Difficulty of Paying Living Expenses: Not hard at all   Food Insecurity: No Food Insecurity    Worried About [address] in the Last Year: Never true    [address] [facility] [address] in the Last Year: Never true   Transportation Needs: No Transportation Needs    Lack of Transportation (Medical): No    Lack of Transportation (Non-Medical): No   Physical Activity: Inactive    Days of Exercise per Week: 0 days    Minutes of Exercise per Session: 30 min   Stress: No Stress Concern Present    Feeling of Stress : Not at all   Social Connections: Socially Isolated    Frequency of Communication with Friends and Family: More than three times a week    Frequency of Social Gatherings with Friends and Family: Once a week    Attends Hinduism Services: Never    Active Member of Clubs or Organizations: No    Attends Club or Organization Meetings: Never    Marital Status: Never    Intimate Partner Violence: Not At Risk    Fear of Current or Ex-Partner: No    Emotionally Abused: No    Physically Abused: No    Sexually Abused: No   Housing Stability: Low Risk     Unable to Pay for Housing in the Last Year: No    Number of [place] in the Last Year: 1    Unstable Housing in the Last Year: No        Review of Symptoms:    Pertinent items are noted in HPI    Review of systems reviewed from Patient History Form dated on today's date and   available in the patient's chart under the Media tab. Vital Signs: There were no vitals filed for this visit. General Exam:     Constitutional: Patient is adequately groomed with no evidence of malnutrition  Mental Status: The patient is oriented to time, place and person. The patient's mood and affect are appropriate. Vascular: Examination reveals no swelling or calf tenderness. Peripheral pulses are palpable and 2+.     Lymphatics: no lymphadenopathy of the inguinal region or lower extremity      Physical Exam: Cervical neck      Primary Exam:    Inspection: No deformity atrophy appreciable curvature      Palpation: No focal trigger point tenderness      Range of Motion: Moderate restriction with extension associated with pain, near full range with flexion and with rotation bilaterally      Strength: Normal upper extremity      Special Tests: Negative Joyner's positive Spurling sign left      Skin: There are no rashes, ulcerations or lesions. Gait: Non-ataxic      Reflex intact upper     Additional Comments:        Additional Examinations:          Neurologic -Light touch sensation and manual muscle testing is normal C5-C8 . Biceps and triceps reflexes are symmetric and +2. Office Imaging Results/Procedures PerformedToday:        Office Procedures:   No orders of the defined types were placed in this encounter. Other Outside Imaging and Testing Personally Reviewed:    XR SHOULDER LEFT (MIN 2 VIEWS)    Result Date: 6/27/2022  Left shoulder HISTORY: Left shoulder pain     3 views demonstrate no abnormality. Add Comments   Add Notifications        Radiation Dose Estimates    No radiation information found for this patient  Narrative   EXAM: CT NECK WITH IV CONTRAST       INDICATION: Lymphadenopathy, neck, swollen glands, salivary stones last year. Intermittent left    neck pain and submental submandibular swelling.       TECHNIQUE: Axial thin section CT images of the neck were obtained after  150 mL of IOHEXOL 350    MG IODINE/ML INTRAVENOUS SOLUTION administered intravenously . Sagittal and coronal 2D    multiplanar reconstructions were performed at the scanner.         COMPARISON: 11/13/2020.       FINDINGS:       Adequate diagnostic quality.       Nasopharynx: Thin mucosal enhancement has diminished or resolved. Symmetric with no mass. Normal torus tubarius, fossa of Rosenmuller, and adenoid tonsillar tissue.        Suprahyoid neck: Normal oropharynx, oral cavity, parapharyngeal space, and retropharyngeal    space. Normal  space, infratemporal fossa, and buccal space.        Infrahyoid neck: Normal larynx, hypopharynx, and supraglottis.        Lymph nodes: No adenopathy.  Small normal sized lymph nodes are unchanged most prominent in the    left supraclavicular region with no suspicious features.       Salivary Glands: Persistent small left submandibular duct calculus in the midportion on series    303 image 110 with interval volume loss of the left submandibular gland and a less prominent    intraglandular ductal system. The submandibular duct is less dilated currently. Otherwise    normal bilateral parotid, submandibular, and sublingual glands with no other dilated ducts.       Thyroid: Markedly hypoplastic or previously resected thyroid tissue with no suspicious mass.       Brain: Normal as included.       Orbits, paranasal sinuses, mastoids: No acute orbital abnormality. Clear paranasal sinuses. Clear mastoid air cells.       Vascular structures: Prominence of the bilateral external jugular veins appears developmental    and unchanged. A varix of the right IJ is unchanged. No arterial or venous occlusive process. Minimal calcified plaque of the right carotid bifurcation.       Thoracic inlet: Unchanged calcified right hilar lymph nodes and calcified right upper lobe    pulmonary nodule due to remote granulomatous disease. No suspicious noncalcified pulmonary    nodule included. The previously discussed left breast nodule is not included on current imaging    or might have resolved. Minimal asymmetry of right breast tissue on series 303 image 1 has not    changed compared to prior series 2 image 8.       Bones: Similar asymmetric degenerative disc disease at C6-7 with severe disc space narrowing,    mild to moderate osteophytes, and discogenic sclerosis. Reversal of cervical lordosis with no    AP subluxation unchanged.       150   IMPRESSION:       1.  Persistent 2 mm calculus within the left submandibular duct, with interval volume loss of    the left submandibular gland and a less prominent ductal system.  It seems likely that    intermittent ball-valve like ductal obstruction could cause intermittent symptomatology that    patient discusses. 2.  No neck mass or adenopathy. 3.  Ectatic neck veins as discussed above including a right IJ varix.       Report Verified by: George Childers MD at 12/23/2021 3:22 PM EST   150             Assessment   Impression: . Encounter Diagnoses   Name Primary?  Cervical discogenic pain syndrome     Radiculitis of left cervical region     DDD (degenerative disc disease), cervical     Cervical spondylosis               Plan:       MRI cervical spine evaluate severity of discopathy/ stenosis suspected clinically  Consider Her a candidate for spine intervention injection  Activity modification, cervical spine precautions  cervical spinestabilization home exercise program  Medical pain management: NSAID: Diclofenac with GI precaution after completing prednisone,. Use of muscle relaxants and Ultram short-term minimize use of narcotics  Off work for 1 week and clinical reassessment ASAP after MRI        The nature of the finding, probable diagnosis and likely treatment was thoroughly discussed with the patient. The options, risks, complications, alternative treatment as well as some of the differential diagnosis was discussed. The patient was thoroughly informed and all questions were answered. the patient indicated understanding and satisfaction with the discussion. Orders:      No orders of the defined types were placed in this encounter. Disclaimer: \"This note was dictated with voice recognition software. Though review and correction are routine, we apologize for any errors. \"

## 2022-06-29 NOTE — LETTER
[address]  [address] [facility] [address]  Phone: [phone]  Fax: [phone]    Jamil Duff MD        June 29, 2022     Patient: Sindhu Bernard   YOB: 1960   Date of Visit: 6/29/2022       To Whom It May Concern: It is my medical opinion that Kianna Swan Should remain off work today through July 8, 2022. If you have any questions or concerns, please don't hesitate to call.     Sincerely,      Conrad Ya MD.    Jamil Duff MD

## 2022-06-29 NOTE — LETTER
[address]  [address] [facility] [address]  Phone: [phone]  Fax: [phone]           Radha Galeas MD      June 30, 2022     Patient: Misti Whittington   MR Number: [id number]   YOB: 1960   Date of Visit: 6/29/2022       Dear Dr. Sara Peña: Thank you for referring Dain Sexton to me for evaluation/treatment. Below are the relevant portions of my assessment and plan of care. Impression: . Encounter Diagnoses   Name Primary?  Cervical discogenic pain syndrome     Radiculitis of left cervical region     DDD (degenerative disc disease), cervical     Cervical spondylosis               Plan:       MRI cervical spine evaluate severity of discopathy/ stenosis suspected clinically  Consider Her a candidate for spine intervention injection  Activity modification, cervical spine precautions  cervical spinestabilization home exercise program  Medical pain management: NSAID: Diclofenac with GI precaution after completing prednisone,. Use of muscle relaxants and Ultram short-term minimize use of narcotics  Off work for 1 week and clinical reassessment ASAP after MRI        The nature of the finding, probable diagnosis and likely treatment was thoroughly discussed with the patient. The options, risks, complications, alternative treatment as well as some of the differential diagnosis was discussed. The patient was thoroughly informed and all questions were answered. the patient indicated understanding and satisfaction with the discussion. Orders:      No orders of the defined types were placed in this encounter. Disclaimer: \"This note was dictated with voice recognition software. Though review and correction are routine, we apologize for any errors. \"           If you have questions, please do not hesitate to call me. I look forward to following Jordyn along with you.     Sincerely,        Radha Galeas, MD    CC providers:  Darlin Gomez MD  [address] [facility] [address] [id number]  [place]

## 2022-07-01 NOTE — PROGRESS NOTES
Kalyani Novoa MD  [facility] Physicians - Rheumatology    [x] [facility]:  [facility] [address] [facility] [] [address]:  [address] [facility] [address] [facility]   Office: [phone]  Fax: [phone]     RHEUMATOLOGY PROGRESS NOTE    ASSESSMENT/PLAN:  Maryuri Green is a 57 y.o. female w/ generalized OA previously evaluated by  rheumatologist, Dr. Mitch Norris, on 3/30/17.      PMHx pertinent for pre-diabetes, hypothyroidism, and insomnia.     Current rheum meds:  Diclofenac 50 mg BID: per Dr. Bernie Lutz  Methocarbamol 500 mg TID PRN: per PCP  Tramadol: per Dr. Bernie Lutz  OTC Acetaminophen 650 mg     Prior rheum meds:  Cyclobenzaprine 5-10 mg QHS: caused heart burn  Naproxen 500 mg BID PRN    1. Chronic right hip pain  Assessment & Plan:  - R hip pain previously improved on Naproxen and Methocarbamol. Pt reported return of pain. Prior X-ray of the R hip from 5/2020 did not reveal any significant arthritis. Pt declined MRI hip study. - d/w pt that I do not think this is related to an inflammatory or autoimmune disease. She did not have significant tenderness in her R trochanteric bursa on exam today. - repeat X-ray of the R hip. Discussed checking MRI of the hip if her updated X-ray findings are unremarkable. - cont oral Diclofenac and Tramadol per Dr. Bernie Lutz.  - we discussed increasing her Acetaminophen dose to up to 1000 mg TID PRN. Orders:  -     XR HIP RIGHT (2-3 VIEWS); Future  -     methocarbamol (ROBAXIN) 750 MG tablet; Take 1 tablet by mouth 3 times daily as needed (pain), Disp-90 tablet, R-2Normal  2. Elevated CK  Assessment & Plan:  - no evidence of inflammatory myopathy. CPK normalized on 2/15/22. Return if symptoms worsen or fail to improve. The risks and benefits of my recommendations, as well as other treatment options, benefits and side effects were discussed with the patient today. Questions were answered.     NOTE: This report is transcribed by using voice recognition software dragon. Every effort is made to ensure accuracy; however, inadvertent computerized  transcription errors may be present. SUBJECTIVE:  Past medical/surgical history, medications and allergies are reviewed and updated as appropriate. Interval Hx:   Pt was last seen over two yrs ago. She reports worsening pain in her R hip. Pain is localized to the lateral aspect of her R hip. Pain occurs at night time when she is laying in bed. Denies any groin pain. She tells me she is seeing Dr. Verenice Lema for neck pain and L shoulder pain. She is currently taking oral Diclofenac, Methocarbamol and Tramadol. She feels Methocarbamol 500 mg TID \"is not doing anything for me\". She reports good pain relief from Tramadol. She is also taking OTC Acetaminophen 650 mg. Denies subjective muscle weakness, myalgias, dysphagia or rash. No Sx of Raynaud's.     ROS:  Constitutional: worsening insomnia (gets 4-5 hrs of sleep/night) and fatigue since the beginning of 2020 per pt Sx worsened after menopause, denies fever/chills, night sweats, unintentional weight loss  Integumentary: denies photosensitivity, rash, patchy alopecia, or Sx of Raynaud's phenomenon  Eyes: denies dry eyes, redness or pain, visual disturbance, or floaters  Ears: denies hearing loss, tinnitus, vertigo, or recurrent ear infections  Nose: denies nasal ulcers or recurrent sinusitis  Oral cavity: denies dry mouth or oral ulcers  Cardiovascular: denies CP, palpitations, Hx of pericardial effusion or pericarditis  Respiratory: denies SOB, cough, hemoptysis, or pleurisy  Gastrointestinal: denies heart burn, dysphagia or esophageal dysmotility, denies change in bowel habits or Sx of IBD  Musculoskeletal:  refer to above HPI     No Known Allergies    Past Medical History:        Diagnosis Date    Acquired hypothyroidism 11/25/2019    Asthma     Essential hypertension 11/25/2019    Osteoarthritis        Past Surgical History: Procedure Laterality Date    DILATION AND CURETTAGE OF UTERUS         Medications:    Current Outpatient Medications   Medication Sig Dispense Refill    vitamin D 25 MCG (1000 UT) CAPS Take 1,000 Units by mouth daily      methocarbamol (ROBAXIN) 750 MG tablet Take 1 tablet by mouth 3 times daily as needed (pain) 90 tablet 2    diclofenac (VOLTAREN) 50 MG EC tablet Take 1 tablet by mouth 2 times daily 60 tablet 1    traMADol (ULTRAM) 50 MG tablet Take 1 tablet by mouth every 6 hours as needed for Pain for up to 7 days. 20 tablet 0    metoprolol succinate (TOPROL XL) 25 MG extended release tablet TAKE 1/2 TABLET BY MOUTH DAILY 45 tablet 1    amLODIPine (NORVASC) 5 MG tablet TAKE ONE TABLET BY MOUTH DAILY 90 tablet 1    levothyroxine (SYNTHROID) 88 MCG tablet TAKE ONE TABLET BY MOUTH DAILY 90 tablet 1    traZODone (DESYREL) 150 MG tablet TAKE ONE TABLET BY MOUTH ONCE NIGHTLY 90 tablet 1    pravastatin (PRAVACHOL) 10 MG tablet TAKE ONE TABLET BY MOUTH EVERY OTHER DAY 45 tablet 1    losartan (COZAAR) 50 MG tablet Take 1 tablet by mouth daily      aspirin 81 MG EC tablet Take 1 tablet by mouth daily 30 tablet 3    progesterone (PROMETRIUM) 200 MG capsule Take 200 mg by mouth nightly       No current facility-administered medications for this visit.         OBJECTIVE:  Physical Exam:  BP (!) 142/84 (Site: Right Upper Arm, Position: Sitting, Cuff Size: Medium Adult)   Pulse 60   Temp 97 °F (36.1 °C) (Infrared)   Ht 5' 2\" (1.575 m)   Wt 174 lb (78.9 kg)   BMI 31.83 kg/m²     GEN: AAOx3, in NAD, well-appearing  HEAD: normocephalic, atraumatic  EYES: no injection or icterus  CVS: RRR  LUNGS: in no acute respiratory distress, CTAB  MSK:  Upper extremities:   Hands: no synovitis in the MCP, PIP, or DIP joints b/l, no dactylitis, able to make strong full fists   Wrist: no synovitis in the wrist joints b/l, FROM   Elbow: no synovitis or bursitis, FROM  Lower extremities:   Hip: trochanteric bursa NTTP b/l   Knees: no warmth or effusion present, FROM   Ankles: no synovitis, FROM, Achilles tendons w/o swelling or warmth NTTP   Feet: no toe swelling or pain or warmth on palpation w/ FROM, negative MTP squeeze test  INTEGUMENT: no rash or psoriatic lesions, no petechiae, bruises, or palpable purpura, no patchy alopecia, no nail pitting or periungual changes, no clubbing or digital ulcers    DATA:  Labs:   I personally reviewed interval labs and discussed w/ the pt in detail which showed:    Lab Results   Component Value Date    WBC 7.3 07/28/2020    HGB 13.2 07/28/2020    HCT 39.2 07/28/2020    MCV 90.2 07/28/2020     07/28/2020    LYMPHOPCT 23.5 07/28/2020    RBC 4.34 07/28/2020    MCH 30.5 07/28/2020    MCHC 33.8 07/28/2020    RDW 13.5 07/28/2020     Lab Results   Component Value Date     02/15/2022    K 4.6 02/15/2022     02/15/2022    CO2 24 02/15/2022    BUN 16 02/15/2022    CREATININE 0.7 02/15/2022    GLUCOSE 110 (H) 02/15/2022    CALCIUM 9.2 02/15/2022    PROT 6.4 02/15/2022    LABALBU 4.5 02/15/2022    BILITOT 0.3 02/15/2022    ALKPHOS 98 02/15/2022    AST 13 (L) 02/15/2022    ALT 13 02/15/2022    LABGLOM >60 02/15/2022    GFRAA >60 02/15/2022    AGRATIO 2.4 (H) 02/15/2022    GLOB 2.1 07/23/2021     Lab Results   Component Value Date    COLORU YELLOW 12/23/2019    CLARITYU Clear 12/23/2019    GLUCOSEU Negative 12/23/2019    BILIRUBINUR Negative 12/23/2019    KETUA Negative 12/23/2019    SPECGRAV 1.012 12/23/2019    BLOODU Negative 12/23/2019    PHUR 7.0 12/23/2019    PROTEINU Negative 12/23/2019    UROBILINOGEN 0.2 12/23/2019    NITRU Negative 12/23/2019    LEUKOCYTESUR Negative 12/23/2019    LABMICR Not Indicated 12/23/2019    URINETYPE Cleancatch 12/23/2019     Lab Results   Component Value Date    VITD25 38.9 11/25/2019     No results found for: C3     No results found for: C4     No results found for: ANTIDSDNAIGG     No results found for: [facility]     Lab Results   Component Value Date CRP 0.9 05/18/2020     Lab Results   Component Value Date    SEDRATE 10 05/18/2020    SEDRATE 16 11/25/2019     Lab Results   Component Value Date    CKTOTAL 176 02/15/2022    CKTOTAL 273 (H) 08/27/2021    CKTOTAL 268 (H) 07/23/2021    CKTOTAL 109 07/27/2020     Imaging:  I personally reviewed interval imaging and discussed w/ the pt in detail which included:    X-ray R hip (5/5/20): FINDINGS:   No fracture or dislocation.  Hip joint space is normal.  No evidence of AVN.  Sacroiliac joints demonstrate no significant sclerosis or fusion.      I independently reviewed above X-rays, agree w/ normal hip joint findings, additionally no evidence of sacroiliitis.     X-ray L-spine (5/5/20):  1. L4-5 grade 1 spondylolisthesis. 2. L4-5 and L5-S1 mild disc space narrowing. Above results were discussed w/ the pt in detail during today's visit.

## 2022-07-05 ENCOUNTER — OFFICE VISIT (OUTPATIENT)
Dept: RHEUMATOLOGY | Age: 62
End: 2022-07-05
Payer: COMMERCIAL

## 2022-07-05 ENCOUNTER — TELEPHONE (OUTPATIENT)
Dept: ORTHOPEDIC SURGERY | Age: 62
End: 2022-07-05

## 2022-07-05 ENCOUNTER — HOSPITAL ENCOUNTER (OUTPATIENT)
Dept: GENERAL RADIOLOGY | Age: 62
Discharge: HOME | End: 2022-07-05
Payer: COMMERCIAL

## 2022-07-05 VITALS
HEART RATE: 60 BPM | TEMPERATURE: 97 F | SYSTOLIC BLOOD PRESSURE: 142 MMHG | BODY MASS INDEX: 32.02 KG/M2 | WEIGHT: 174 LBS | DIASTOLIC BLOOD PRESSURE: 84 MMHG | HEIGHT: 62 IN

## 2022-07-05 DIAGNOSIS — G89.29 CHRONIC RIGHT HIP PAIN: ICD-10-CM

## 2022-07-05 DIAGNOSIS — M25.551 CHRONIC RIGHT HIP PAIN: ICD-10-CM

## 2022-07-05 DIAGNOSIS — M25.551 CHRONIC RIGHT HIP PAIN: Primary | ICD-10-CM

## 2022-07-05 DIAGNOSIS — R74.8 ELEVATED CK: ICD-10-CM

## 2022-07-05 DIAGNOSIS — G89.29 CHRONIC RIGHT HIP PAIN: Primary | ICD-10-CM

## 2022-07-05 PROBLEM — Z51.81 ENCOUNTER FOR THERAPEUTIC DRUG MONITORING: Status: ACTIVE | Noted: 2022-07-05

## 2022-07-05 PROCEDURE — 73502 X-RAY EXAM HIP UNI 2-3 VIEWS: CPT

## 2022-07-05 PROCEDURE — 99214 OFFICE O/P EST MOD 30 MIN: CPT | Performed by: INTERNAL MEDICINE

## 2022-07-05 RX ORDER — METHOCARBAMOL 750 MG/1
750 TABLET, FILM COATED ORAL 3 TIMES DAILY PRN
Qty: 90 TABLET | Refills: 2 | Status: SHIPPED | OUTPATIENT
Start: 2022-07-05 | End: 2022-08-04

## 2022-07-05 NOTE — TELEPHONE ENCOUNTER
General Question     Subject: PT STATES SHE WILL NEED AN OFF WORK NOTE FOR 07/11, SHE WILL  WHEN SHE COMES IN FOR HER FU VISIT.    Patient and /or Facility Request: [address],[place] [address] Number: [phone]

## 2022-07-05 NOTE — RESULT ENCOUNTER NOTE
R hip joint is normal, no evidence of inflammatory arthritis. I recommend she follows up w/ Dr. Lacey Dimas for her R hip pain.

## 2022-07-05 NOTE — ASSESSMENT & PLAN NOTE
- R hip pain previously improved on Naproxen and Methocarbamol. Pt reported return of pain. Prior X-ray of the R hip from 5/2020 did not reveal any significant arthritis. Pt declined MRI hip study. - d/w pt that I do not think this is related to an inflammatory or autoimmune disease. She did not have significant tenderness in her R trochanteric bursa on exam today. - repeat X-ray of the R hip. Discussed checking MRI of the hip if her updated X-ray findings are unremarkable. - cont oral Diclofenac and Tramadol per Dr. Chey Gomez.  - we discussed increasing her Acetaminophen dose to up to 1000 mg TID PRN.

## 2022-07-07 ENCOUNTER — TELEPHONE (OUTPATIENT)
Dept: ORTHOPEDIC SURGERY | Age: 62
End: 2022-07-07

## 2022-07-07 DIAGNOSIS — F40.240 CLAUSTROPHOBIA: Primary | ICD-10-CM

## 2022-07-07 RX ORDER — DIAZEPAM 5 MG/1
TABLET ORAL
Qty: 2 TABLET | Refills: 0 | Status: SHIPPED | OUTPATIENT
Start: 2022-07-07 | End: 2022-07-21

## 2022-07-07 NOTE — TELEPHONE ENCOUNTER
General Question     Subject: MRI  Patient and /or Facility Request:Yolanda Manzaon   Contact Number: [phone]    PATIENT IS REQ SOMETHING TO HELP WITH HER MRI. HAS ATTEMPTED TWICE TO GET THE MRI BUT IS TOO CLAUSTROPHOBIC. PLEASE RETURN CALL AT ABOVE NUMBER.     PATIENT'S PHARMACY: [address] [facility], [address] [place] [address]

## 2022-07-07 NOTE — TELEPHONE ENCOUNTER
Pt states that she has tried to get 2 MRI's but has been unsuccessful due to dealing with claustrophobia. Pt is requesting medication to assist with getting MRI be sent to [facility]. Please advise.

## 2022-07-11 ENCOUNTER — FOLLOWUP TELEPHONE ENCOUNTER (OUTPATIENT)
Dept: RHEUMATOLOGY | Age: 62
End: 2022-07-11

## 2022-07-11 NOTE — TELEPHONE ENCOUNTER
Patient called back today and was given the results of her Xray per Dr Nayeli Ramirez notes. Patient verbalized understanding.

## 2022-07-14 ENCOUNTER — TELEPHONE (OUTPATIENT)
Dept: PRIMARY CARE CLINIC | Age: 62
End: 2022-07-14

## 2022-07-14 NOTE — TELEPHONE ENCOUNTER
Spoke with patient. Let her know we never received a request for records from [facility]. She asked that I send over the visit and xray from 6/27/22 to them at [phone] with attention to [facility].  This has been completed per patient request.

## 2022-07-19 ENCOUNTER — OFFICE VISIT (OUTPATIENT)
Dept: ORTHOPEDIC SURGERY | Age: 62
End: 2022-07-19
Payer: COMMERCIAL

## 2022-07-19 VITALS — BODY MASS INDEX: 32.01 KG/M2 | WEIGHT: 173.94 LBS | HEIGHT: 62 IN

## 2022-07-19 DIAGNOSIS — M50.30 DDD (DEGENERATIVE DISC DISEASE), CERVICAL: ICD-10-CM

## 2022-07-19 DIAGNOSIS — M54.12 RADICULITIS OF LEFT CERVICAL REGION: ICD-10-CM

## 2022-07-19 DIAGNOSIS — M47.812 CERVICAL SPONDYLOSIS: ICD-10-CM

## 2022-07-19 PROCEDURE — 99214 OFFICE O/P EST MOD 30 MIN: CPT | Performed by: INTERNAL MEDICINE

## 2022-07-20 ENCOUNTER — TELEPHONE (OUTPATIENT)
Dept: ORTHOPEDIC SURGERY | Age: 62
End: 2022-07-20

## 2022-07-20 NOTE — TELEPHONE ENCOUNTER
Waiting for restrictions reply from Dr. Taryn Phan. Working on [employer] for [employer]   Will complete once 7/19/2022 dictation is in sharron (Taryn Phan)

## 2022-07-22 ENCOUNTER — HOSPITAL ENCOUNTER (OUTPATIENT)
Dept: PHYSICAL THERAPY | Age: 62
Setting detail: THERAPIES SERIES
Discharge: HOME | End: 2022-07-22
Payer: COMMERCIAL

## 2022-07-22 PROCEDURE — 97110 THERAPEUTIC EXERCISES: CPT | Performed by: PHYSICAL THERAPIST

## 2022-07-22 PROCEDURE — 97161 PT EVAL LOW COMPLEX 20 MIN: CPT | Performed by: PHYSICAL THERAPIST

## 2022-07-22 PROCEDURE — 97140 MANUAL THERAPY 1/> REGIONS: CPT | Performed by: PHYSICAL THERAPIST

## 2022-07-22 NOTE — FLOWSHEET NOTE
[facility]    Physical Therapy Treatment Note/ Progress Report:     Date:  2022    Patient Name:  Jarrell Bahena    :  1960  MRN: [id number]  Medical/Treatment Diagnosis Information:  Diagnosis: M50.20 (ICD-10-CM) - Cervical discogenic pain syndrome; M54.12 (ICD-10-CM) - Radiculitis of left cervical region; M50.30 (ICD-10-CM) - DDD (degenerative disc disease), cervical; M75.82 (ICD-10-CM) - Tendinitis of left rotator cuff; M47.812 (ICD-10-CM) - Cervical spondylosis  Treatment Diagnosis: cervical pain with radiating symptoms; decreased L shoulder strength and mobility  Insurance/Certification information:  PT Insurance Information: Susie  Physician Information:  [address] signed (Y/N): []  Yes [x]  No  Date sent: 22    Date of Patient follow up with Physician:      Progress Report: []  Yes  [x]  No     Functional Scale:           Date assessed:  TO physical FS primary measure score = 69; risk adjusted = 52  22    Date Range for reporting period:  Beginnin22  Ending:      Progress report due (10 Rx/or 30 days whichever is less):      Recertification due (POC duration/ or 90 days whichever is less): 22     Visit # Insurance Allowable Auth Needed   1 30 []Yes    []No     Pain level:  3/10     SUBJECTIVE:  See eval    OBJECTIVE: See eval  Observation:   Test measurements:      RESTRICTIONS/PRECAUTIONS: none    Exercises/Interventions:   Therapeutic Ex Wt / Resistance Sets/sec Reps Notes   pulley npv      UT stretch  20\" 3    Genie stretch  20\" 3    Wall slides  1 10    B band ER green 2 10 Tactile scapular facilitation          Chin tuck seated 3\" 10                                                        Therapeutic Activities                                                                      Manual Intervention       Shld /GH Mobs       Post Cap mobs       Thoracic/Rib manipulation       CT MT/Mobs Mid cervical downglides GII 3'     Cervical tx  3'  gentle   PROM MT L shd 2'                   NMR re-education                                                                 Pt. Education:  -pt educated on diagnosis, prognosis and expectations for rehab  -all pt questions were answered    Home Exercise Program:  Access Code: [email or web address]  URL: [email or web address]  Date: 07/22/2022  Prepared by: Mel Llanos    Exercises  Seated Gentle Upper Trapezius Stretch - 1 x daily - 7 x weekly - 3 reps - 20s hold  Neck Retraction - 1 x daily - 7 x weekly - 10 reps - 3s hold  Standing Cross Body Shoulder Stretch at Wall - 1 x daily - 7 x weekly - 3 reps - 20s hold  Standing Shoulder External Rotation with Resistance - 1 x daily - 7 x weekly - 2 sets - 10 reps  Standing shoulder flexion wall slides - 1 x daily - 7 x weekly - 10 reps - 3s hold      Therapeutic Exercise and NMR EXR  [x] (98776) Provided verbal/tactile cueing for activities related to strengthening, flexibility, endurance, ROM  for improvements in scapular, scapulothoracic and UE control with self care, reaching, carrying, lifting, house/yardwork, driving/computer work.    [] (86427) Provided verbal/tactile cueing for activities related to improving balance, coordination, kinesthetic sense, posture, motor skill, proprioception  to assist with  scapular, scapulothoracic and UE control with self care, reaching, carrying, lifting, house/yardwork, driving/computer work. Therapeutic Activities:    [] (62258 or 86466) Provided verbal/tactile cueing for activities related to improving balance, coordination, kinesthetic sense, posture, motor skill, proprioception and motor activation to allow for proper function of scapular, scapulothoracic and UE control with self care, carrying, lifting, driving/computer work.      Home Exercise Program:    [x] (99515) Reviewed/Progressed HEP activities related to strengthening, flexibility, endurance, ROM of scapular, scapulothoracic and UE control with self care, reaching, carrying, lifting, house/yardwork, driving/computer work  [] (56930) Reviewed/Progressed HEP activities related to improving balance, coordination, kinesthetic sense, posture, motor skill, proprioception of scapular, scapulothoracic and UE control with self care, reaching, carrying, lifting, house/yardwork, driving/computer work      Manual Treatments:  PROM / STM / Oscillations-Mobs:  G-I, II, III, IV (PA's, Inf., Post.)  [x] (88933) Provided manual therapy to mobilize soft tissue/joints of cervical/CT, scapular GHJ and UE for the purpose of modulating pain, promoting relaxation,  increasing ROM, reducing/eliminating soft tissue swelling/inflammation/restriction, improving soft tissue extensibility and allowing for proper ROM for normal function with self care, reaching, carrying, lifting, house/yardwork, driving/computer work    Modalities:      Charges:  Timed Code Treatment Minutes: 26   Total Treatment Minutes: 45       [x] EVAL (LOW) 35252 (typically 20 minutes face-to-face)  [] EVAL (MOD) 86841 (typically 30 minutes face-to-face)  [] EVAL (HIGH) 51957 (typically 45 minutes face-to-face)  [] RE-EVAL     [x] TH(01767) x  1   [] IONTO (57182)  [] NMR (81390) x     [] VASO (14086)  [x] Manual (51349) x  1   [] Other:  [] TA (92812)x     [] Mech Traction (00841)  [] ES(attended) (52452)     [] ES (un) (86452):          GOALS:  Patient stated goal: decrease pain  [] Progressing: [] Met: [] Not Met: [] Adjusted    Therapist goals for Patient:   Short Term Goals: To be achieved in: 2 weeks  1. Independent in HEP and progression per patient tolerance, in order to prevent re-injury. [] Progressing: [] Met: [] Not Met: [] Adjusted  2. Patient will have a decrease in pain <2/10 to facilitate improvement in movement, function, and ADLs as indicated by Functional Deficits.   [] Progressing: [] Met: [] Not Met: [] Adjusted    Long Term Goals: To be achieved in: 6-8 weeks  1. FOTO score of at least 75 to assist with reaching prior level of function. [] Progressing: [] Met: [] Not Met: [] Adjusted  2. Patient will demonstrate increased AROM to [facility] of cervical/thoracic spine and L shoulder elevation to 160 without pain to allow for proper joint functioning as indicated by patients Functional Deficits. [] Progressing: [] Met: [] Not Met: [] Adjusted  3. Patient will demonstrate an increase in postural awareness and control and activation of  Deep cervical stabilizers to allow for proper functional mobility as indicated by patients Functional Deficits. [] Progressing: [] Met: [] Not Met: [] Adjusted  4. Patient will return to functional activities including sleeping without increased symptoms or restriction. [] Progressing: [] Met: [] Not Met: [] Adjusted  5. Patient will demonstrate ability to lift 25+# to table height without pain to progress toward returning to work. [] Progressing: [] Met: [] Not Met: [] Adjusted       ASSESSMENT:  See eval      Treatment/Activity Tolerance:  [x] Patient tolerated treatment well [] Patient limited by fatique  [] Patient limited by pain  [] Patient limited by other medical complications  [] Other:     Overall Progression Towards Functional goals/ Treatment Progress Update:  [] Patient is progressing as expected towards functional goals listed. [] Progression is slowed due to complexities/Impairments listed. [] Progression has been slowed due to co-morbidities.   [x] Plan just implemented, too soon to assess goals progression <30days   [] Goals require adjustment due to lack of progress  [] Patient is not progressing as expected and requires additional follow up with physician  [] Other    Return to Play: (if applicable)   []  Stage 1: Intro to Strength   []  Stage 2: Dynamic Strength and Intro to Plyometrics   []  Stage 3: Advanced Plyometrics and Intro to Throwing   []  Stage 4: Sport specific Training/Return to Sport     []  Ready to Return to Play, [employer]   []  Not Ready for Return to Sports   Comments:      Prognosis for POC: [x] Good [] Fair  [] Poor    Patient requires continued skilled intervention: [x] Yes  [] No      PLAN: See eval; 1-2x/wk for 6-8wks  [] Continue per plan of care [] Alter current plan (see comments)  [x] Plan of care initiated [] Hold pending MD visit [] Discharge    Electronically signed by: Sharyl Ganser, PT     Note: If patient does not return for scheduled/recommended follow up visits, this note will serve as a discharge from care along with the most recent update on progress.

## 2022-07-22 NOTE — PLAN OF CARE
[place] [address]  Phone [phone]    Fax [phone]                                                       Physical Therapy Certification    Dear Cecilia Marcelino*  ,    We had the pleasure of evaluating the following patient for physical therapy services at [address] [place] [address]. A summary of our findings can be found in the initial assessment below. This includes our plan of care. If you have any questions or concerns regarding these findings, please do not hesitate to contact me at the office phone number checked above. Thank you for the referral.       Physician Signature:_______________________________Date:__________________  By signing above (or electronic signature), therapists plan is approved by physician      Patient: Sadia Schwarz   : 1960   MRN: [id number]  Referring Physician: Cecilia Marcelino*        Evaluation Date: 2022      Medical Diagnosis Information:  Diagnosis: M50.20 (ICD-10-CM) - Cervical discogenic pain syndrome; M54.12 (ICD-10-CM) - Radiculitis of left cervical region; M50.30 (ICD-10-CM) - DDD (degenerative disc disease), cervical; M75.82 (ICD-10-CM) - Tendinitis of left rotator cuff; M47.812 (ICD-10-CM) - Cervical spondylosis   Treatment Diagnosis: cervical pain with radiating symptoms; decreased L shoulder strength and mobility                                         Insurance information: PT Insurance Information: [employer]    Precautions/ Contra-indications: none  Latex Allergy:  [x]NO      []YES  Preferred Language for Healthcare:   [x]English       []Other:    C-SSRS Triggered by Intake questionnaire (Past 2 wk assessment ):   [x] No, Questionnaire did not trigger screening.   [] Yes, Patient intake triggered C-SSRS Screening     [] Completed, no further action required.    [] Completed, PCP notified via Epic    SUBJECTIVE: Patient stated complaint: about 1 month ago pt. Woke up in the morning and she had a lot pain in her L arm and was unable to lift it without specific incident; saw PCP the following Monday and took muscle relaxers then saw referring MD; pt. Had about 2 weeks where pain was very bad then improved; pt. Returned to work this past Monday and feels that the lifting that she has to do at work (up to 50#) has further aggravated her arm; sleep disturbances especially when rolling onto left side; difficulty reaching behind the back    Relevant Medical History:HTN - controlled, heart disease, OA  Functional Outcome: FOTO physical FS primary measure score = 69; Risk adjusted = 52    Pain Scale: 3/10  Easing factors: muscle relaxers, tylenol, has taken tramadol in the past  Provocative factors: increased activity, lifting, shoulder movements     Type: []Constant   []Intermittent  []Radiating [x]Localized - base of neck into shoulder []other:     Numbness/Tingling: occasional abnormal sensation in L foream    Occupation/School:      Living Status/Prior Level of Function: Prior to this injury / incident, pt was independent with ADLs and IADLs, working 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 pain, household management    OBJECTIVE:   Palpation: tenderness, hypertonicity L UT    Functional Mobility/Transfers: independent    Posture: slight forward head    Bandages/Dressings/Incisions: n/a    Gait: (include devices/WB status):  WNL     Dermatomes Normal Abnormal Comments   Top of head (C1) x     Posterior occipital region (C2) x     Side of neck (C3) x     Top of shoulder (C4) x     Lateral deltoid (C5) x     Tip of thumb (C6) x     Distal middle finger (C7) x     Distal fifth finger (C8) x     Medial forearm (T1) x     Lower extremity x         CERV ROM     Cervical Flexion 55    Cervical Extension 35 pain    Cervical SB 35 30 stretch   Cervical rotation 60 pain 60        PROM Left Right   Shoulder Flex 145 165   Shoulder Abd 140 pain 160   Shoulder ER 70 95 Shoulder IR ~25 pain ~40             Strength / Myotomes Left Right   Cervical Flexion (C1-2)     Cervical Side-bending (C3)     Shoulder Shrug (C4)     Shoulder Flex 4 pain 4+   Shoulder Scap     Shoulder Abduction (C5) 4 pain 4+   Shoulder ER 4 pain 4+   Shoulder IR 4+ 5   Biceps (C6) 5 5   Triceps (C7) 4+ 4+   Wrist Extension (C6)     Wrist Flexion (C7)           Thumb Abduction (C8)     Finger Abduction (T1)       Joint mobility: mid cervical   []Normal    [x]Hypo   []Hyper    Orthopaedic Special Tests  Positive  Negative  NT Comments    Hautard's    x    Rhomberg   x    Sharps-Loretta   x    Cervical Torsion / Body Rotation    x    C2 Kick  x     Modified Shear   x    Compression x      Distraction   x                                   [x] Patient history, allergies, meds reviewed. Medical chart reviewed. See intake form. Review Of Systems (ROS):  [x]Performed Review of systems (Integumentary, CardioPulmonary, Neurological) by intake and observation. Intake form has been scanned into medical record. Patient has been instructed to contact their primary care physician regarding ROS issues if not already being addressed at this time.       Co-morbidities/Complexities (which will affect course of rehabilitation):   []None        []Hx of COVID   Arthritic conditions   []Rheumatoid arthritis (M05.9)  [x]Osteoarthritis (M19.91)  []Gout   Cardiovascular conditions   [x]Hypertension (I10)  []Hyperlipidemia (E78.5)  []Angina pectoris (I20)  [x]Atherosclerosis (I70)  []Pacemaker  []Hx of CABG/stent/  cardiac surgeries   Musculoskeletal conditions   []Disc pathology   []Congenital spine pathologies   []Osteoporosis (M81.8)  []Osteopenia (M85.8)  []Scoliosis       Endocrine conditions   []Hypothyroid (E03.9)  []Hyperthyroid Gastrointestinal conditions   []Constipation (Y68.17)   Metabolic conditions   []Morbid obesity (E66.01)  []Diabetes type 1(E10.65) or 2 (E11.65)   []Neuropathy (G60.9)     Cardio/Pulmonary conditions   []Asthma (J45)  []Coughing   []COPD (J44.9)  []CHF  []A-fib   Psychological Disorders  []Anxiety (F41.9)  []Depression (F32.9)   []Other:   Developmental Disorders  []Autism (F84.0)  []CP (G80)  []Down Syndrome (Q90.9)  []Developmental delay     Neurological conditions  []Prior Stroke (I69.30)  []Parkinson's (G20)  []Encephalopathy (G93.40)  []MS (G35)  []Post-polio (G14)  []SCI  []TBI  []ALS Other conditions  []Fibromyalgia (M79.7)  []Vertigo  []Syncope  []Kidney Failure  []Cancer      []currently undergoing                treatment  []Pregnancy  []Incontinence   Prior surgeries  []involved limb  []previous spinal surgery  [] section birth  []hysterectomy  []bowel / bladder surgery  []other relevant surgeries   []Other:              Barriers to/and or personal factors that will affect rehab potential:              [x]Age  []Sex   []Smoker              []Motivation/Lack of Motivation                        []Co-Morbidities              []Cognitive Function, education/learning barriers              []Environmental, home barriers              [x]profession/work barriers  []past PT/medical experience  []other:  Justification:     Falls Risk Assessment (30 days):   [x] Falls Risk assessed and no intervention required.   [] Falls Risk assessed and Patient requires intervention due to being higher risk   TUG score (>12s at risk):     [] Falls education provided, including         ASSESSMENT:    Functional Impairments:     [x]Noted cervical/thoracic/GHJ joint hypomobility   []Noted cervical/thoracic/GHJ joint hypermobility   [x]Decreased cervical/UE functional ROM   []Noted Headache pain aggravated by neck movements with/without dizziness   []Abnormal reflexes/sensation/myotomal/dermatomal deficits   [x]Decreased DCF control or ability to hold head up   [x]Decreased RC/scapular/core strength and neuromuscular control    [x]Decreased UE functional strength   []other:      Functional Activity Limitations (from functional questionnaire and intake)   []Reduced ability to tolerate prolonged functional positions   [x]Reduced ability or difficulty with changes of positions or transfers between positions   [x]Reduced ability to maintain good posture and demonstrate good body mechanics with sitting, bending, and lifting   [] Reduced ability or tolerance with driving and/or computer work   [x]Reduced ability to perform lifting, reaching, carrying tasks   []Reduced ability to concentrate   [x]Reduced ability to sleep    [x]Reduced ability to tolerate any impact through UE or spine   []Reduced ability to ambulate prolonged functional periods/distances   []other:    Participation Restrictions   []Reduced participation in self care activities   [x]Reduced participation in home management activities   [x]Reduced participation in work activities   [x]Reduced participation in social activities. []Reduced participation in sport/recreational activities. Classification/Subgrouping:   []signs/symptoms consistent with neck pain with mobility deficits     []signs/symptoms consistent with neck pain with movement coordinated impairments    [x]signs/symptoms consistent with neck pain with radiating pain    []signs/symptoms consistent with neck pain with headaches (cervicogenic)    []Signs/symptoms consistent with nerve root involvement including myotome & dermatome dysfunction   []sign/symptoms consistent with facet dysfunction of cervical and thoracic spine    []signs/symptoms consistent suggesting central cord compression/UMN syndromes   []signs/symptoms consistent with discogenic cervical pain   []signs/symptoms consistent with rib dysfunction   []signs/symptoms consistent with postural dysfunction   [x]signs/symptoms consistent with shoulder pathology    []signs/symptoms consistent with post-surgical status including decreased ROM, strength and function.    []signs/symptoms consistent with pathology which may benefit from Dry Needling   []signs/symptoms which may limit the use of advanced manual therapy techniques: (Hypertension, recent trauma, intolerance to end range positions, prior TIA, visual issues, UE myotomes loss )     Prognosis/Rehab Potential:      []Excellent   [x]Good    []Fair   []Poor    Tolerance of evaluation/treatment:    []Excellent   [x]Good    []Fair   []Poor    Physical Therapy Evaluation Complexity Justification  [x] A history of present problem with:  [] no personal factors and/or comorbidities that impact the plan of care;  [x]1-2 personal factors and/or comorbidities that impact the plan of care  []3 personal factors and/or comorbidities that impact the plan of care  [x] An examination of body systems using standardized tests and measures addressing any of the following: body structures and functions (impairments), activity limitations, and/or participation restrictions;:  [] a total of 1-2 or more elements   [] a total of 3 or more elements   [x] a total of 4 or more elements   [x] A clinical presentation with:  [x] stable and/or uncomplicated characteristics   [] evolving clinical presentation with changing characteristics  [] unstable and unpredictable characteristics;   [x] Clinical decision making of [x] low, [] moderate, [] high complexity using standardized patient assessment instrument and/or measurable assessment of functional outcome. [x] EVAL (LOW) 34359 (typically 20 minutes face-to-face)  [] EVAL (MOD) 87053 (typically 30 minutes face-to-face)  [] EVAL (HIGH) 49678 (typically 45 minutes face-to-face)  [] RE-EVAL     PLAN:   Frequency/Duration:  1-2 days per week for 6-8 Weeks:  Interventions:  [x]  Therapeutic exercise including: strength training, ROM, for cervical spine,scapula, core and Upper extremity, including postural re-education. [x]  NMR activation and proprioception for Deep cervical flexors, periscapular and RC muscles and Core, including postural re-education.     [x]  Manual therapy as indicated for C/T spine, ribs, Soft tissue to include: Dry Needling/IASTM, STM, PROM, Gr I-IV mobilizations, manipulation. [x] Modalities as needed that may include: thermal agents, E-stim, Biofeedback, US, iontophoresis as indicated  [x] Patient education on joint protection, postural re-education, activity modification, progression of HEP. HEP instruction: Written HEP instructions provided and reviewed. GOALS:  Patient stated goal: decrease pain  [] Progressing: [] Met: [] Not Met: [] Adjusted    Therapist goals for Patient:   Short Term Goals: To be achieved in: 2 weeks  1. Independent in HEP and progression per patient tolerance, in order to prevent re-injury. [] Progressing: [] Met: [] Not Met: [] Adjusted  2. Patient will have a decrease in pain <2/10 to facilitate improvement in movement, function, and ADLs as indicated by Functional Deficits. [] Progressing: [] Met: [] Not Met: [] Adjusted    Long Term Goals: To be achieved in: 6-8 weeks  1. FOTO score of at least 75 to assist with reaching prior level of function. [] Progressing: [] Met: [] Not Met: [] Adjusted  2. Patient will demonstrate increased AROM to [facility] of cervical/thoracic spine and L shoulder elevation to 160 without pain to allow for proper joint functioning as indicated by patients Functional Deficits. [] Progressing: [] Met: [] Not Met: [] Adjusted  3. Patient will demonstrate an increase in postural awareness and control and activation of  Deep cervical stabilizers to allow for proper functional mobility as indicated by patients Functional Deficits. [] Progressing: [] Met: [] Not Met: [] Adjusted  4. Patient will return to functional activities including sleeping without increased symptoms or restriction. [] Progressing: [] Met: [] Not Met: [] Adjusted  5. Patient will demonstrate ability to lift 25+# to table height without pain to progress toward returning to work.   [] Progressing: [] Met: [] Not Met: [] Adjusted     Electronically signed by:  Loly Song PT

## 2022-07-24 NOTE — PROGRESS NOTES
Chief Complaint:   Chief Complaint   Patient presents with    Neck Pain     Cervical, MRI TR, overall feels 80% better. Pain is all achy on the L side of her neck/shoulder all on the anterior side. Pain will sometimes radiate down arm but not as far as it used to. Feels like she has something on her upper arm but nothing is there. Shoulder Pain     L shoulder pain that is achy and radiates down          History of Present Illness:       Patient is a 57 y.o. female returns follow up for the above complaint. The patient was last seen approximately 3 weeksago. The symptoms are improving since the last visit. The patient has had further testing for the problem. MRI completed in the interim. good response to the trial of Steroids    Neck:Left arm pain 50: 50. Pain neck and arm is aching in quality. Pain levels:5. The patient denies new onset or progressive weakness of the upper extremities. The patient denies new onset gain disturbance.     She continues on medical pain management as per previous  inclusive or NSAID- o, muscle relaxant-no further reliance on Ultram     Past Medical History:        Past Medical History:   Diagnosis Date    Acquired hypothyroidism 11/25/2019    Asthma     Essential hypertension 11/25/2019    Osteoarthritis         Present Medications:         Current Outpatient Medications   Medication Sig Dispense Refill    vitamin D 25 MCG (1000 UT) CAPS Take 1,000 Units by mouth daily      methocarbamol (ROBAXIN) 750 MG tablet Take 1 tablet by mouth 3 times daily as needed (pain) 90 tablet 2    diclofenac (VOLTAREN) 50 MG EC tablet Take 1 tablet by mouth 2 times daily 60 tablet 1    metoprolol succinate (TOPROL XL) 25 MG extended release tablet TAKE 1/2 TABLET BY MOUTH DAILY 45 tablet 1    amLODIPine (NORVASC) 5 MG tablet TAKE ONE TABLET BY MOUTH DAILY 90 tablet 1    levothyroxine (SYNTHROID) 88 MCG tablet TAKE ONE TABLET BY MOUTH DAILY 90 tablet 1    traZODone (DESYREL) 150 MG tablet TAKE ONE TABLET BY MOUTH ONCE NIGHTLY 90 tablet 1    pravastatin (PRAVACHOL) 10 MG tablet TAKE ONE TABLET BY MOUTH EVERY OTHER DAY 45 tablet 1    losartan (COZAAR) 50 MG tablet Take 1 tablet by mouth daily      aspirin 81 MG EC tablet Take 1 tablet by mouth daily 30 tablet 3    progesterone (PROMETRIUM) 200 MG capsule Take 200 mg by mouth nightly       No current facility-administered medications for this visit. Allergies:      No Known Allergies        Review of Systems:    Pertinent items are noted in HPI         Vital Signs: There were no vitals filed for this visit. General Exam:     Constitutional: Patient is adequately groomed with no evidence of malnutrition    Physical Exam:  Cervical  neck      Primary Exam:    Inspection: No deformity atrophy appreciable curvature      Palpation: No focal trigger point tenderness      Range of Motion: Only mild global restriction      Strength: Normal upper extremity      Special Tests: Negative Joyner's      Skin: There are no rashes, ulcerations or lesions.       Gait: Nonantalgic      Neurovascular - non focal and intact       Additional Comments:        Additional Examinations:             Office Imaging Results/Procedures PerformedToday:          Office Procedures:     Orders Placed This Encounter   Procedures    [address] ([facility]     Referral Priority:   Routine     Referral Type:   Eval and Treat     Referral Reason:   Specialty Services Required     Requested Specialty:   Physical Therapist     Number of Visits Requested:   1           Other Outside Imaging and Testing Personally Reviewed:             Patient Name: Irvin Luong   Case ID: [id number]   Patient : 1960   Referring Physician: Mini Nichole MD   Exam Date: 2022   Exam Description: MR Cervical Spine w/o Contrast            HISTORY:  Cervical discogenic pain syndrome, left shoulder pain since        TECHNICAL FACTORS:  Long- and short-axis fat- and water-weighted images were performed. COMPARISON:  None. FINDINGS:  No evidence of cervical spine fracture and the vertebral body heights are normal.       Mild lower cervical kyphosis could be related to positioning or dystonia. Anterior and middle    columns are intact as well as the anterior and posterior longitudinal ligaments. No focal    ligamentous disruption or epidural fluid collection. Prespinal, paraspinal, and retrospinal soft tissues are normal.       Marrow signal appears normal.       Visualized posterior fossa and position of the cerebellar tonsils appear normal. Visualized    tevin, medulla, and spinal cord are normal in signal and contour. No evidence of an intradural    or extradural mass. C2-C3: Disc osteophyte complex and left uncovertebral joint hypertrophy with mild left    foraminal stenosis. C3-C4: Normal disc. Right facet hypertrophy without compressive sequelae. C4-C5: Disc osteophyte complex and left uncovertebral joint hypertrophy with mild bilateral    foraminal stenosis. C5-C6: Anterior osteophytes. Disc osteophyte complex and bilateral uncovertebral joint    hypertrophy with mild central canal and mild left foraminal stenosis. C6-C7:Anterior osteophytes. Disc osteophyte complex and bilateral uncovertebral joint    hypertrophy with mild bilateral foraminal stenosis. C7-T1: No focal disc herniation, spinal canal stenosis, or foraminal narrowing. Uncovertebral/facet joints appear normal.       Cervicothoracic junction is intact. Review of the nonspinal soft tissue structures demonstrates no acute or concerning abnormality. CONCLUSION:   1.  Multilevel degenerative changes. The following compressive sequelae are noted:   2.  Mild foraminal stenosis on the left at C2-3, bilaterally at C4-5, on the left at C5-6, and    bilaterally at C6-7.   3. Mild central canal stenosis at C5-6.   4. No evidence of cord pathology or exiting nerve root impingement. 5. Mild lower cervical kyphosis could be related to positioning or dystonia. Thank you for the opportunity to provide your interpretation. Artis Carpenter MD       A: ROSELIA 07/12/2022 2:35 PM           Assessment   Impression: . Encounter Diagnoses   Name Primary? DDD (degenerative disc disease), cervical     Cervical spondylosis     Radiculitis of left cervical region               Plan:       Formal course of PT cervical stabilization program  Continue as needed use of diclofenac with GI precaution along with as needed use of Robaxin  No indication for cervical spine intervention injection at this time  Activity modification cervical spine precautions and emphasize safe lifting techniques with physical activity in the workplace    Approximately 25 minutes was spent related to previewing pertinent medical documentation prior to the patient's visit along with counseling during the patient's visit with respect to treatment options inclusive of alternatives to treatment and the complications and risks related to those treatment options along with expectations of outcome related to those treatments and inclusive of time in the documentation and ordering of testing and treatment after the visit. Orders:        Orders Placed This Encounter   Procedures    [address] ([facility]     Referral Priority:   Routine     Referral Type:   Eval and Treat     Referral Reason:   Specialty Services Required     Requested Specialty:   Physical Therapist     Number of Visits Requested:   1         Elvin Blevins MD.      Aguila Mask: \"This note was dictated with voice recognition software. Though review and correction are routine, we apologize for any errors. \"

## 2022-07-29 ENCOUNTER — HOSPITAL ENCOUNTER (OUTPATIENT)
Dept: PHYSICAL THERAPY | Age: 62
Setting detail: THERAPIES SERIES
Discharge: HOME | End: 2022-07-29
Payer: COMMERCIAL

## 2022-07-29 PROCEDURE — 97110 THERAPEUTIC EXERCISES: CPT | Performed by: PHYSICAL THERAPIST

## 2022-07-29 PROCEDURE — 97140 MANUAL THERAPY 1/> REGIONS: CPT | Performed by: PHYSICAL THERAPIST

## 2022-07-29 NOTE — PLAN OF CARE
[employer]     Physical Therapy Discharge Summary    Dear Franchesca Radford*  ,    We had the pleasure of treating the following patient for physical therapy services at [facility]. A summary of our findings can be found in the discharge summary below. If you have any questions or concerns regarding these findings, please do not hesitate to contact me at the office phone number checked above. Thank you for the referral.     Physician Signature:________________________________Date:__________________  By signing above (or electronic signature), therapists plan is approved by physician      Functional Outcome:   FOTO physical FS primary measure score = 96       Overall Response to Treatment:   [x]Patient is responding well to treatment and improvement is noted with regards  to goals   [x]Patient should continue to improve in reasonable time if they continue HEP   []Patient has plateaued and is no longer responding to skilled PT intervention    []Patient is getting worse and would benefit from return to referring MD   []Patient unable to adhere to initial POC   [x]Other: Pt. Demonstrates significant improvements in shoulder mobility and strength with decreased pain levels and improved reported function. Pt. Is ready for d/c to independent home program.     Date range of Visits: 22-22  Total Visits: 2    Recommendation:    [x] Discharge to HEP. Follow up with PT or MD PRN.          Physical Therapy Treatment Note/ Progress Report:     Date:  2022    Patient Name:  Aleena Cardozo    :  1960  MRN: [id number]  Medical/Treatment Diagnosis Information:  Diagnosis: M50.20 (ICD-10-CM) - Cervical discogenic pain syndrome; M54.12 (ICD-10-CM) - Radiculitis of left cervical region; M50.30 (ICD-10-CM) - DDD (degenerative disc disease), cervical; M75.82 (ICD-10-CM) - Tendinitis of left rotator cuff; M47.812 (ICD-10-CM) - Cervical spondylosis  Treatment Diagnosis: cervical pain with radiating symptoms; decreased L shoulder strength and mobility  Insurance/Certification information:  PT Insurance Information: Susie  Physician Information:  Vicki Trejo [address] signed (Y/N): [x]  Yes []  No  Date sent: 22    Date of Patient follow up with Physician:      Progress Report: [x]  Yes  []  No     Functional Scale:           Date assessed:  FOTO physical FS primary measure score = 69; risk adjusted = 52  22  FOTO physical FS primary measure score = 96     22    Date Range for reporting period:  Beginnin22  Endin22    Progress report due (10 Rx/or 30 days whichever is less): dc    Recertification due (POC duration/ or 90 days whichever is less): dc    Visit # Insurance Allowable Auth Needed   2 30 []Yes    []No     Pain level:  0/10     SUBJECTIVE:  Pt. States that her shoulder is feeling better overall. She was able to lift boxes at work without increased pain. Pt. Reports compliance with HEP and states that she is ready for d/c.      OBJECTIVE:     CERV ROM       Cervical Flexion 55     Cervical Extension 45     Cervical SB 35 35   Cervical rotation 65 65           AROM Left Right   Shoulder Flex 150 Shoulder Abd 160 Shoulder ER  Shoulder IR ~T10                 Strength / Myotomes Left Right   Cervical Flexion (C1-2)       Cervical Side-bending (C3)       Shoulder Shrug (C4)       Shoulder Flex 4+ 4+   Shoulder Scap       Shoulder Abduction (C5) 4 4+   Shoulder ER 4 4+   Shoulder IR 4+ 5   Biceps (C6) 5 5   Triceps (C7) 4+ 4+   Wrist Extension (C6)       Wrist Flexion (C7)              Thumb Abduction (C8)       Finger Abduction (T1)           RESTRICTIONS/PRECAUTIONS: none    Exercises/Interventions:   Therapeutic Ex Wt / Resistance Sets/sec Reps Notes   pulley  3'     UT stretch  20\" 3    Genie stretch  20\" 3    IR towel stretch  10\" 10 Wall slides  1 10    B band ER green 2 10 Tactile scapular facilitation   SL ER 3# 2 10           Chin tuck seated 3\" 10                                                        Therapeutic Activities              Provided reassessment of progress and dc planning  5'                                                      Manual Intervention       Shld /GH Mobs       Post Cap mobs       Thoracic/Rib manipulation       CT MT/Mobs Mid cervical downglides GII 3'     Cervical tx  3'  gentle   PROM MT L shd 4'                   NMR re-education                                                                 Pt. Education:  -pt educated on diagnosis, prognosis and expectations for rehab  -all pt questions were answered    Home Exercise Program:  Access Code: [email or web address]  URL: [email or web address]  Date: 07/29/2022  Prepared by: Jorge Vickers    Exercises  Seated Gentle Upper Trapezius Stretch - 1 x daily - 7 x weekly - 3 reps - 20s hold  Neck Retraction - 1 x daily - 7 x weekly - 10 reps - 3s hold  Standing Cross Body Shoulder Stretch at Wall - 1 x daily - 7 x weekly - 3 reps - 20s hold  Standing Shoulder External Rotation with Resistance - 1 x daily - 7 x weekly - 2 sets - 10 reps  Standing shoulder flexion wall slides - 1 x daily - 7 x weekly - 10 reps - 3s hold  Sidelying Shoulder ER with Towel and Dumbbell - 1 x daily - 7 x weekly - 2 sets - 10 reps  Standing Shoulder Internal Rotation Stretch with Towel - 1 x daily - 7 x weekly - 10 reps - 10s hold        Therapeutic Exercise and NMR EXR  [x] (56131) Provided verbal/tactile cueing for activities related to strengthening, flexibility, endurance, ROM  for improvements in scapular, scapulothoracic and UE control with self care, reaching, carrying, lifting, house/yardwork, driving/computer work.    [] (92261) Provided verbal/tactile cueing for activities related to improving balance, coordination, kinesthetic sense, posture, motor skill, proprioception  to assist with scapular, scapulothoracic and UE control with self care, reaching, carrying, lifting, house/yardwork, driving/computer work. Therapeutic Activities:    [] (14990 or 50533) Provided verbal/tactile cueing for activities related to improving balance, coordination, kinesthetic sense, posture, motor skill, proprioception and motor activation to allow for proper function of scapular, scapulothoracic and UE control with self care, carrying, lifting, driving/computer work.      Home Exercise Program:    [x] (51786) Reviewed/Progressed HEP activities related to strengthening, flexibility, endurance, ROM of scapular, scapulothoracic and UE control with self care, reaching, carrying, lifting, house/yardwork, driving/computer work  [] (26942) Reviewed/Progressed HEP activities related to improving balance, coordination, kinesthetic sense, posture, motor skill, proprioception of scapular, scapulothoracic and UE control with self care, reaching, carrying, lifting, house/yardwork, driving/computer work      Manual Treatments:  PROM / STM / Oscillations-Mobs:  G-I, II, III, IV (PA's, Inf., Post.)  [x] (53555) Provided manual therapy to mobilize soft tissue/joints of cervical/CT, scapular GHJ and UE for the purpose of modulating pain, promoting relaxation,  increasing ROM, reducing/eliminating soft tissue swelling/inflammation/restriction, improving soft tissue extensibility and allowing for proper ROM for normal function with self care, reaching, carrying, lifting, house/yardwork, driving/computer work    Modalities:      Charges:  Timed Code Treatment Minutes: 40   Total Treatment Minutes: 40       [] EVAL (LOW) 37024 (typically 20 minutes face-to-face)  [] EVAL (MOD) 07295 (typically 30 minutes face-to-face)  [] EVAL (HIGH) 35275 (typically 45 minutes face-to-face)  [] RE-EVAL     [x] CE(75406) x  2  [] IONTO (62591)  [] NMR (88813) x     [] VASO (93456)  [x] Manual (89766) x  1   [] Other:  [] TA (97864)x     [] Mech Traction (41616)  [] ES(attended) (03732)     [] ES (un) (44443):          GOALS:  Patient stated goal: decrease pain  [] Progressing: [x] Met: [] Not Met: [] Adjusted    Therapist goals for Patient:   Short Term Goals: To be achieved in: 2 weeks  1. Independent in HEP and progression per patient tolerance, in order to prevent re-injury. [] Progressing: [x] Met: [] Not Met: [] Adjusted  2. Patient will have a decrease in pain <2/10 to facilitate improvement in movement, function, and ADLs as indicated by Functional Deficits. [] Progressing: [x] Met: [] Not Met: [] Adjusted    Long Term Goals: To be achieved in: 6-8 weeks  1. FOTO score of at least 75 to assist with reaching prior level of function. [] Progressing: [x] Met: [] Not Met: [] Adjusted  2. Patient will demonstrate increased AROM to [employer] [facility] of cervical/thoracic spine and L shoulder elevation to 160 without pain to allow for proper joint functioning as indicated by patients Functional Deficits. [x] Progressing: [] Met: [] Not Met: [] Adjusted  3. Patient will demonstrate an increase in postural awareness and control and activation of  Deep cervical stabilizers to allow for proper functional mobility as indicated by patients Functional Deficits. [] Progressing: [x] Met: [] Not Met: [] Adjusted  4. Patient will return to functional activities including sleeping without increased symptoms or restriction. [] Progressing: [x] Met: [] Not Met: [] Adjusted  5. Patient will demonstrate ability to lift 25+# to table height without pain to progress toward returning to work. [x] Progressing: [] Met: [] Not Met: [] Adjusted       ASSESSMENT: pt. Tolerated therapy today without complaints. Pt. Demonstrates significant improvements in shoulder mobility and strength with decreased pain levels and improved reported function. Reviewed and updated HEP.  Patient is currently independent with symptom management and home exercise program. Patient reports understanding of the importance of continued compliance with home exercise program after discharge. Patient should reach all long term goals with compliance to home exercise program upon discharge. Treatment/Activity Tolerance:  [x] Patient tolerated treatment well [] Patient limited by fatique  [] Patient limited by pain  [] Patient limited by other medical complications  [] Other:     Overall Progression Towards Functional goals/ Treatment Progress Update:  [x] Patient is progressing as expected towards functional goals listed. [] Progression is slowed due to complexities/Impairments listed. [] Progression has been slowed due to co-morbidities. [] Plan just implemented, too soon to assess goals progression <30days   [] Goals require adjustment due to lack of progress  [] Patient is not progressing as expected and requires additional follow up with physician  [] Other    Return to Play: (if applicable)   []  Stage 1: Intro to Strength   []  Stage 2: Dynamic Strength and Intro to Plyometrics   []  Stage 3: Advanced Plyometrics and Intro to Throwing   []  Stage 4: Sport specific Training/Return to Sport     []  Ready to Return to Play, [employer]   []  Not Ready for Return to Sports   Comments:      Prognosis for POC: [x] Good [] Fair  [] Poor    Patient requires continued skilled intervention: [] Yes  [x] No      PLAN: D/C to HEP. Pt to call with any questions and f/u with PT prn.    [] Continue per plan of care [] Alter current plan (see comments)  [] Plan of care initiated [] Hold pending MD visit [x] Discharge    Electronically signed by: Cynthia Palm PT     Note: If patient does not return for scheduled/recommended follow up visits, this note will serve as a discharge from care along with the most recent update on progress.

## 2022-08-24 NOTE — TELEPHONE ENCOUNTER
Medication:   Requested Prescriptions     Pending Prescriptions Disp Refills    traZODone (DESYREL) 100 MG tablet [Pharmacy Med Name: traZODone 100 MG TABLET] 30 tablet 2     Sig: TAKE ONE TABLET BY MOUTH ONCE NIGHTLY AS NEEDED FOR SLEEP     Last Filled: 3.13.20    Last appt: 8/10/2020   Next appt: Visit date not found    Last OARRS: No flowsheet data found. no abdominal pain, no bloating, no constipation, no diarrhea, no nausea and no vomiting.

## 2022-10-20 DIAGNOSIS — I10 ESSENTIAL HYPERTENSION: ICD-10-CM

## 2022-10-20 DIAGNOSIS — E03.9 ACQUIRED HYPOTHYROIDISM: ICD-10-CM

## 2022-10-20 DIAGNOSIS — F51.01 PRIMARY INSOMNIA: ICD-10-CM

## 2022-10-20 RX ORDER — LEVOTHYROXINE SODIUM 88 UG/1
TABLET ORAL
Qty: 30 TABLET | Refills: 0 | Status: SHIPPED | OUTPATIENT
Start: 2022-10-20 | End: 2022-11-04 | Stop reason: SDUPTHER

## 2022-10-20 RX ORDER — TRAZODONE HYDROCHLORIDE 150 MG/1
TABLET ORAL
Qty: 30 TABLET | Refills: 0 | Status: SHIPPED | OUTPATIENT
Start: 2022-10-20

## 2022-10-20 RX ORDER — AMLODIPINE BESYLATE 5 MG/1
TABLET ORAL
Qty: 30 TABLET | Refills: 0 | Status: SHIPPED | OUTPATIENT
Start: 2022-10-20 | End: 2022-11-04 | Stop reason: SDUPTHER

## 2022-10-21 RX ORDER — LOSARTAN POTASSIUM 50 MG/1
50 TABLET ORAL DAILY
Qty: 30 TABLET | Refills: 0 | Status: SHIPPED | OUTPATIENT
Start: 2022-10-21 | End: 2022-11-04 | Stop reason: SDUPTHER

## 2022-10-21 NOTE — TELEPHONE ENCOUNTER
Pt needs meds refill.  Lov 2/15/22  Next ov 11/4/22    losartan (COZAAR) 50 MG tablet  [address] [facility] [id number] [place] [address]   Phone:  [phone]  Fax:  [phone]

## 2022-10-21 NOTE — TELEPHONE ENCOUNTER
Medication:   Requested Prescriptions     Pending Prescriptions Disp Refills    losartan (COZAAR) 50 MG tablet 30 tablet      Sig: Take 1 tablet by mouth daily     Last Filled:  08/27/21    Last appt: 6/27/2022   Next appt: 11/4/2022    Last OARRS: No flowsheet data found.

## 2022-10-24 ENCOUNTER — OFFICE VISIT (OUTPATIENT)
Dept: ORTHOPEDIC SURGERY | Age: 62
End: 2022-10-24
Payer: COMMERCIAL

## 2022-10-24 VITALS — HEIGHT: 62 IN | WEIGHT: 173.94 LBS | BODY MASS INDEX: 32.01 KG/M2

## 2022-10-24 DIAGNOSIS — M51.36 DDD (DEGENERATIVE DISC DISEASE), LUMBAR: ICD-10-CM

## 2022-10-24 DIAGNOSIS — M54.10 RADICULAR PAIN OF RIGHT LOWER EXTREMITY: ICD-10-CM

## 2022-10-24 DIAGNOSIS — M47.816 LUMBAR SPONDYLOSIS: ICD-10-CM

## 2022-10-24 DIAGNOSIS — M54.10 RADICULAR PAIN OF LEFT LOWER EXTREMITY: ICD-10-CM

## 2022-10-24 DIAGNOSIS — M25.551 HIP PAIN, BILATERAL: ICD-10-CM

## 2022-10-24 DIAGNOSIS — M43.10 DEGENERATIVE SPONDYLOLISTHESIS: ICD-10-CM

## 2022-10-24 DIAGNOSIS — M25.552 HIP PAIN, BILATERAL: ICD-10-CM

## 2022-10-24 DIAGNOSIS — M43.16 SPONDYLOLISTHESIS OF LUMBAR REGION: ICD-10-CM

## 2022-10-24 PROCEDURE — 99214 OFFICE O/P EST MOD 30 MIN: CPT | Performed by: INTERNAL MEDICINE

## 2022-10-24 RX ORDER — METHYLPREDNISOLONE 4 MG/1
TABLET ORAL
Qty: 1 KIT | Refills: 0 | Status: SHIPPED | OUTPATIENT
Start: 2022-10-24

## 2022-10-24 RX ORDER — CYCLOBENZAPRINE HCL 10 MG
10 TABLET ORAL NIGHTLY PRN
Qty: 30 TABLET | Refills: 1 | Status: SHIPPED | OUTPATIENT
Start: 2022-10-24

## 2022-10-24 NOTE — PROGRESS NOTES
Take 1 tablet by mouth daily 30 tablet 0    traZODone (DESYREL) 150 MG tablet TAKE ONE TABLET BY MOUTH ONCE NIGHTLY 30 tablet 0    amLODIPine (NORVASC) 5 MG tablet TAKE ONE TABLET BY MOUTH DAILY 30 tablet 0    levothyroxine (SYNTHROID) 88 MCG tablet TAKE ONE TABLET BY MOUTH DAILY 30 tablet 0    vitamin D 25 MCG (1000 UT) CAPS Take 1,000 Units by mouth daily      diclofenac (VOLTAREN) 50 MG EC tablet Take 1 tablet by mouth 2 times daily 60 tablet 1    metoprolol succinate (TOPROL XL) 25 MG extended release tablet TAKE 1/2 TABLET BY MOUTH DAILY 45 tablet 1    pravastatin (PRAVACHOL) 10 MG tablet TAKE ONE TABLET BY MOUTH EVERY OTHER DAY 45 tablet 1    aspirin 81 MG EC tablet Take 1 tablet by mouth daily 30 tablet 3    progesterone (PROMETRIUM) 200 MG capsule Take 200 mg by mouth nightly       No current facility-administered medications for this visit. Allergies:      No Known Allergies     Social History:         Social History     Socioeconomic History    Marital status:      Spouse name: Not on file    Number of children: Not on file    Years of education: Not on file    Highest education level: Not on file   Occupational History    Not on file   Tobacco Use    Smoking status: Former     Packs/day: 0.00     Years: 0.00     Pack years: 0.00     Types: Cigarettes     Quit date:      Years since quittin.8    Smokeless tobacco: Never   Vaping Use    Vaping Use: Never used   Substance and Sexual Activity    Alcohol use:  Yes     Alcohol/week: 1.0 standard drink     Types: 1 Glasses of wine per week    Drug use: Never    Sexual activity: Not Currently   Other Topics Concern    Not on file   Social History Narrative    Not on file     Social Determinants of Health     Financial Resource Strain: Not on file   Food Insecurity: Not on file   Transportation Needs: Not on file   Physical Activity: Unknown    Days of Exercise per Week: 0 days    Minutes of Exercise per Session: Not on file   Stress: Not on file   Social Connections: Not on file   Intimate Partner Violence: Not At Risk    Fear of Current or Ex-Partner: No    Emotionally Abused: No    Physically Abused: No    Sexually Abused: No   Housing Stability: Not on file        Review of Symptoms:    Pertinent items are noted in HPI    Review of systems reviewed from Patient History Form dated on today's date and   available in the patient's chart under the Media tab. Vital Signs: There were no vitals filed for this visit. General Exam:     Constitutional: Patient is adequately groomed with no evidence of malnutrition  Mental Status: The patient is oriented to time, place and person. The patient's mood and affect are appropriate. Vascular: Examination reveals no swelling or calf tenderness. Peripheral pulses are palpable and 2+. Lymphatics: no lymphadenopathy of the inguinal region or lower extremity      Physical Exam: lower back      Primary Exam:    Inspection: No deformity atrophy appreciable curvature      Palpation: No focal trigger point tenderness      Range of Motion: 90/20 pain with flexion and extension phase greater than endrange extension      Strength: Normal lower extremity      Special Tests: Negative SLR      Skin: There are no rashes, ulcerations or lesions. Gait: Nonantalgic      Reflex intact lower     Additional Comments:        Additional Examinations:           Right Lower Extremity: Examination of the right does not show any tenderness, deformity or injury. Range of motion is unremarkable. There is no gross instability. There are no rashes, ulcerations or lesions. Strength and tone are normal.  Left Lower Extremity: Examination of the left hip show any tenderness, deformity or injury. Range of motion is unremarkable. There is no gross instability. There are no rashes, ulcerations or lesions. Strength and tone are normal.   Neurolgic -Light touch sensation and manual muscle testing normal L2-S1.  No fasiculations. Pattella tendon and Achilles tendon reflexes +2 bilaterally. Seated SLR negative        Office Imaging Results/Procedures PerformedToday:      Radiology:      X-rays obtained and reviewed in office:   Views 2 views left hips   Location left hip   Impression femoral acetabular joint has a normal radiographic appearance without stigmata of DDH or BAKARI. No enthesopathic changes involving the greater trochanter. No other soft tissue or osseous abnormalities    3 views lumbar spine: Lumbar spine there is evidence of grade 1 degenerative anterospondylolisthesis L4-L5 moderate hypertrophic facet arthropathy L4-L5 greater than L3/L4 there is moderate intervertebral to space narrowing L4/L5 greater than L5/S1 suggestion of foraminal narrowing at L5/S1 vertebral body heights are well-maintained   Office Procedures:     Orders Placed This Encounter   Procedures    XR HIP LEFT (2-3 VIEWS)     Standing Status:   Future     Number of Occurrences:   1     Standing Expiration Date:   10/24/2023     Order Specific Question:   Reason for exam:     Answer:   pain    XR LUMBAR SPINE (2-3 VIEWS)     Standing Status:   Future     Number of Occurrences:   1     Standing Expiration Date:   10/24/2023     Order Specific Question:   Reason for exam:     Answer:   pain    MRI LUMBAR SPINE WO CONTRAST     Standing Status:   Future     Standing Expiration Date:   10/24/2023     Scheduling Instructions:      Zane Closs, please contact pt to schedule, [address] will obtain auth and fwd to your facility. Pt advised to f/u in clinic 2-3 days after MRI for results. PATIENT REQUESTING OPEN MRI     Order Specific Question:   Reason for exam:     Answer:   R/O HNP / STENOSIS     Order Specific Question:   What is the sedation requirement? Answer:   None           Other Outside Imaging and Testing Personally Reviewed:      RIGHT HIP ON 7/5/2022       HISTORY: Chronic hip pain.        COMPARISON: Right hip 5/5/2020. FINDINGS:       2 views of the right hip were performed to include an AP pelvis. No soft tissue normality is seen on standard radiograph. No fracture or acute bony pathology is visualized. Alignment is anatomic. Joint spaces are normal and symmetric. There are mild degenerative changes at the sacroiliac joints. Impression       Negative right hip         Assessment   Impression: . Encounter Diagnoses   Name Primary? Spondylolisthesis of lumbar region     Degenerative spondylolisthesis     Radicular pain of right lower extremity     Radicular pain of left lower extremity     Lumbar spondylosis     DDD (degenerative disc disease), lumbar     Hip pain, bilateral               Plan:     MRI thoracic spine evaluate severity of discopathy/ stenosis suspected clinically  Consider Her a candidate for spine intervention injection  Activity modification, lumbar spine precautions  lumbar spinestabilization home exercise program  Medical pain management: Medrol and Flexeril nightly as needed  Consider LSO use for the workplace as needed    Overall it is my clinical opinion that her hip girdle pain is radicular in etiology. Proceed as outlined above. The nature of the finding, probable diagnosis and likely treatment was thoroughly discussed with the patient. The options, risks, complications, alternative treatment as well as some of the differential diagnosis was discussed. The patient was thoroughly informed and all questions were answered. the patient indicated understanding and satisfaction with the discussion.       Orders:        Orders Placed This Encounter   Procedures    XR HIP LEFT (2-3 VIEWS)     Standing Status:   Future     Number of Occurrences:   1     Standing Expiration Date:   10/24/2023     Order Specific Question:   Reason for exam:     Answer:   pain    XR LUMBAR SPINE (2-3 VIEWS)     Standing Status:   Future     Number of Occurrences:   1 Standing Expiration Date:   10/24/2023     Order Specific Question:   Reason for exam:     Answer:   pain    MRI LUMBAR SPINE WO CONTRAST     Standing Status:   Future     Standing Expiration Date:   10/24/2023     Scheduling Instructions:      Song Douglass, please contact pt to schedule, [address] will obtain auth and fwd to your facility. Pt advised to f/u in clinic 2-3 days after MRI for results. PATIENT REQUESTING OPEN MRI     Order Specific Question:   Reason for exam:     Answer:   R/O HNP / STENOSIS     Order Specific Question:   What is the sedation requirement? Answer:   None           Disclaimer: \"This note was dictated with voice recognition software. Though review and correction are routine, we apologize for any errors. \"

## 2022-11-04 ENCOUNTER — OFFICE VISIT (OUTPATIENT)
Dept: PRIMARY CARE CLINIC | Age: 62
End: 2022-11-04
Payer: COMMERCIAL

## 2022-11-04 VITALS
WEIGHT: 171 LBS | TEMPERATURE: 97.1 F | BODY MASS INDEX: 31.47 KG/M2 | HEIGHT: 62 IN | RESPIRATION RATE: 16 BRPM | OXYGEN SATURATION: 97 % | SYSTOLIC BLOOD PRESSURE: 126 MMHG | HEART RATE: 61 BPM | DIASTOLIC BLOOD PRESSURE: 84 MMHG

## 2022-11-04 DIAGNOSIS — I25.10 NONOCCLUSIVE CORONARY ATHEROSCLEROSIS OF NATIVE CORONARY ARTERY: ICD-10-CM

## 2022-11-04 DIAGNOSIS — Z00.00 ENCOUNTER FOR WELL ADULT EXAM WITHOUT ABNORMAL FINDINGS: Primary | ICD-10-CM

## 2022-11-04 DIAGNOSIS — I10 ESSENTIAL HYPERTENSION: ICD-10-CM

## 2022-11-04 DIAGNOSIS — R73.03 PREDIABETES: ICD-10-CM

## 2022-11-04 DIAGNOSIS — Z00.00 ENCOUNTER FOR WELL ADULT EXAM WITHOUT ABNORMAL FINDINGS: ICD-10-CM

## 2022-11-04 DIAGNOSIS — F51.01 PRIMARY INSOMNIA: ICD-10-CM

## 2022-11-04 DIAGNOSIS — J06.9 UPPER RESPIRATORY TRACT INFECTION, UNSPECIFIED TYPE: ICD-10-CM

## 2022-11-04 DIAGNOSIS — E03.9 ACQUIRED HYPOTHYROIDISM: ICD-10-CM

## 2022-11-04 DIAGNOSIS — J40 BRONCHITIS: ICD-10-CM

## 2022-11-04 LAB
A/G RATIO: 2.6 (ref 1.1–2.2)
ALBUMIN SERPL-MCNC: 4.6 G/DL (ref 3.4–5)
ALP BLD-CCNC: 101 U/L (ref 40–129)
ALT SERPL-CCNC: 10 U/L (ref 10–40)
ANION GAP SERPL CALCULATED.3IONS-SCNC: 10 MMOL/L (ref 3–16)
AST SERPL-CCNC: 13 U/L (ref 15–37)
BACTERIA: NORMAL /HPF
BASOPHILS ABSOLUTE: 0 K/UL (ref 0–0.2)
BASOPHILS RELATIVE PERCENT: 0.1 %
BILIRUB SERPL-MCNC: 0.4 MG/DL (ref 0–1)
BILIRUBIN URINE: NEGATIVE
BLOOD, URINE: NEGATIVE
BUN BLDV-MCNC: 11 MG/DL (ref 7–20)
CALCIUM SERPL-MCNC: 9.8 MG/DL (ref 8.3–10.6)
CHLORIDE BLD-SCNC: 103 MMOL/L (ref 99–110)
CHOLESTEROL, TOTAL: 134 MG/DL (ref 0–199)
CLARITY: CLEAR
CO2: 29 MMOL/L (ref 21–32)
COLOR: YELLOW
CREAT SERPL-MCNC: 0.8 MG/DL (ref 0.6–1.2)
EOSINOPHILS ABSOLUTE: 0 K/UL (ref 0–0.6)
EOSINOPHILS RELATIVE PERCENT: 0.9 %
EPITHELIAL CELLS, UA: 4 /HPF (ref 0–5)
ESTIMATED AVERAGE GLUCOSE: 114 MG/DL
GFR SERPL CREATININE-BSD FRML MDRD: >60 ML/MIN/{1.73_M2}
GLUCOSE BLD-MCNC: 101 MG/DL (ref 70–99)
GLUCOSE URINE: NEGATIVE MG/DL
HBA1C MFR BLD: 5.6 %
HCT VFR BLD CALC: 39.3 % (ref 36–48)
HDLC SERPL-MCNC: 45 MG/DL (ref 40–60)
HEMOGLOBIN: 13.4 G/DL (ref 12–16)
HYALINE CASTS: 0 /LPF (ref 0–8)
KETONES, URINE: NEGATIVE MG/DL
LDL CHOLESTEROL CALCULATED: 76 MG/DL
LEUKOCYTE ESTERASE, URINE: ABNORMAL
LYMPHOCYTES ABSOLUTE: 1.5 K/UL (ref 1–5.1)
LYMPHOCYTES RELATIVE PERCENT: 28.1 %
MCH RBC QN AUTO: 29.9 PG (ref 26–34)
MCHC RBC AUTO-ENTMCNC: 34.2 G/DL (ref 31–36)
MCV RBC AUTO: 87.4 FL (ref 80–100)
MICROSCOPIC EXAMINATION: YES
MONOCYTES ABSOLUTE: 0.5 K/UL (ref 0–1.3)
MONOCYTES RELATIVE PERCENT: 9.8 %
NEUTROPHILS ABSOLUTE: 3.3 K/UL (ref 1.7–7.7)
NEUTROPHILS RELATIVE PERCENT: 61.1 %
NITRITE, URINE: NEGATIVE
PDW BLD-RTO: 13.6 % (ref 12.4–15.4)
PH UA: 6.5 (ref 5–8)
PLATELET # BLD: 254 K/UL (ref 135–450)
PMV BLD AUTO: 8.4 FL (ref 5–10.5)
POTASSIUM SERPL-SCNC: 4.9 MMOL/L (ref 3.5–5.1)
PROTEIN UA: NEGATIVE MG/DL
RBC # BLD: 4.49 M/UL (ref 4–5.2)
RBC UA: 1 /HPF (ref 0–4)
SODIUM BLD-SCNC: 142 MMOL/L (ref 136–145)
SPECIFIC GRAVITY UA: 1.01 (ref 1–1.03)
TOTAL PROTEIN: 6.4 G/DL (ref 6.4–8.2)
TRIGL SERPL-MCNC: 64 MG/DL (ref 0–150)
TSH SERPL DL<=0.05 MIU/L-ACNC: 2.35 UIU/ML (ref 0.27–4.2)
URINE REFLEX TO CULTURE: ABNORMAL
URINE TYPE: ABNORMAL
UROBILINOGEN, URINE: 0.2 E.U./DL
VLDLC SERPL CALC-MCNC: 13 MG/DL
WBC # BLD: 5.4 K/UL (ref 4–11)
WBC UA: 3 /HPF (ref 0–5)

## 2022-11-04 PROCEDURE — 3074F SYST BP LT 130 MM HG: CPT | Performed by: INTERNAL MEDICINE

## 2022-11-04 PROCEDURE — 99396 PREV VISIT EST AGE 40-64: CPT | Performed by: INTERNAL MEDICINE

## 2022-11-04 PROCEDURE — 3078F DIAST BP <80 MM HG: CPT | Performed by: INTERNAL MEDICINE

## 2022-11-04 PROCEDURE — 81003 URINALYSIS AUTO W/O SCOPE: CPT | Performed by: INTERNAL MEDICINE

## 2022-11-04 RX ORDER — ALBUTEROL SULFATE 90 UG/1
2 AEROSOL, METERED RESPIRATORY (INHALATION) EVERY 6 HOURS PRN
Qty: 18 G | Refills: 0 | Status: SHIPPED | OUTPATIENT
Start: 2022-11-04

## 2022-11-04 RX ORDER — PRAVASTATIN SODIUM 10 MG
TABLET ORAL
Qty: 45 TABLET | Refills: 1 | Status: SHIPPED | OUTPATIENT
Start: 2022-11-04

## 2022-11-04 RX ORDER — BROMPHENIRAMINE MALEATE, PSEUDOEPHEDRINE HYDROCHLORIDE, AND DEXTROMETHORPHAN HYDROBROMIDE 2; 30; 10 MG/5ML; MG/5ML; MG/5ML
5 SYRUP ORAL 4 TIMES DAILY PRN
Qty: 120 ML | Refills: 0 | Status: SHIPPED | OUTPATIENT
Start: 2022-11-04

## 2022-11-04 RX ORDER — AMLODIPINE BESYLATE 5 MG/1
TABLET ORAL
Qty: 90 TABLET | Refills: 1 | Status: SHIPPED | OUTPATIENT
Start: 2022-11-04

## 2022-11-04 RX ORDER — AZITHROMYCIN 250 MG/1
TABLET, FILM COATED ORAL
Qty: 1 PACKET | Refills: 0 | Status: SHIPPED | OUTPATIENT
Start: 2022-11-04 | End: 2022-11-14

## 2022-11-04 RX ORDER — LEVOTHYROXINE SODIUM 88 UG/1
TABLET ORAL
Qty: 90 TABLET | Refills: 1 | Status: SHIPPED | OUTPATIENT
Start: 2022-11-04

## 2022-11-04 RX ORDER — LOSARTAN POTASSIUM 50 MG/1
50 TABLET ORAL DAILY
Qty: 90 TABLET | Refills: 1 | Status: SHIPPED | OUTPATIENT
Start: 2022-11-04

## 2022-11-04 RX ORDER — METOPROLOL SUCCINATE 25 MG/1
TABLET, EXTENDED RELEASE ORAL
Qty: 45 TABLET | Refills: 1 | Status: SHIPPED | OUTPATIENT
Start: 2022-11-04

## 2022-11-04 SDOH — ECONOMIC STABILITY: FOOD INSECURITY: WITHIN THE PAST 12 MONTHS, THE FOOD YOU BOUGHT JUST DIDN'T LAST AND YOU DIDN'T HAVE MONEY TO GET MORE.: NEVER TRUE

## 2022-11-04 SDOH — ECONOMIC STABILITY: FOOD INSECURITY: WITHIN THE PAST 12 MONTHS, YOU WORRIED THAT YOUR FOOD WOULD RUN OUT BEFORE YOU GOT MONEY TO BUY MORE.: NEVER TRUE

## 2022-11-04 ASSESSMENT — ENCOUNTER SYMPTOMS
BACK PAIN: 1
VOMITING: 0
EYE PAIN: 0
PHOTOPHOBIA: 0
FACIAL SWELLING: 0
RECTAL PAIN: 0
SORE THROAT: 1
CONSTIPATION: 0
SINUS PAIN: 0
SINUS PRESSURE: 1
EYE DISCHARGE: 0
TROUBLE SWALLOWING: 0
NAUSEA: 0
EYE REDNESS: 0
ABDOMINAL PAIN: 0
EYE ITCHING: 0
APNEA: 0
CHOKING: 0
BLOOD IN STOOL: 0
SHORTNESS OF BREATH: 0
ANAL BLEEDING: 0
RHINORRHEA: 1
COUGH: 1
DIARRHEA: 0
VOICE CHANGE: 0
CHEST TIGHTNESS: 0
WHEEZING: 1
ABDOMINAL DISTENTION: 0

## 2022-11-04 ASSESSMENT — SOCIAL DETERMINANTS OF HEALTH (SDOH): HOW HARD IS IT FOR YOU TO PAY FOR THE VERY BASICS LIKE FOOD, HOUSING, MEDICAL CARE, AND HEATING?: NOT HARD AT ALL

## 2022-11-04 NOTE — PROGRESS NOTES
Well Adult Note  Name: Iris Silva Date: 2022   MRN: [id number] Sex: Female   Age: 57 y.o. Ethnicity: Non- / Non    : 1960 Race: Black /       Chief Complaint   Patient presents with    Annual Exam    Other     Patient is sick. Has cough, chest congestion, headaches and coughing up green phlegm. No fever or body aches       Deana Ruiz is here for well adult exam.  History:  Patient presents for annual physical exam. Pap smear done on 22. Mammogram done on 22. Patient has hypertension. Patient takes Amlodipine 5mg once daily and Metoprolol ER 25mg 1/2 tablet once daily. Patient decreases salt. Patient does not exercise. Patient has insomnia. Patient takes Trazodone 150mg nightly. Patient wants to increase dose. Patient states it helps her fall asleep but not always stay asleep. Patient states when she has cut pill to take 200mg it works better and she sleeps well. Patient has hypothyroidism. Patient takes Levothyroxine 88mcg once daily. Patient denies fatigue, weight gain, constipation, dry skin, cold intolerance, and hair thinning. Patient has prediabetes. Patient does a low carbohydrate diet. Patient has coronary artery disease. Patient takes aspirin 81mg once daily and Pravastatin 10mg every other night. Patient states Pravastatin is the only statin she tolerates. Patient sees Cardiology. Patient states she had a sore throat on 10/16/22 and started coughing a little and headache. Symptoms lasted 4-5 days and resolved. Patient states on 10/29/22 she started with sore throat again, productive cough with green phlegm, headaches, wheezing with sleeping, runny nose, and sinus pressure. Patient states voice hoarse for 3-4 days. Patient has been taking Mucinex and Advil. Patient has degenerative disc disease. Patient sees Orthopedics. Patient declines flu shots.     Review of Systems   Constitutional:  Negative for activity change, appetite change, chills, diaphoresis, fatigue, fever and unexpected weight change. HENT:  Positive for rhinorrhea, sinus pressure and sore throat. Negative for congestion, ear discharge, ear pain, facial swelling, hearing loss, mouth sores, nosebleeds, postnasal drip, sinus pain, sneezing, tinnitus, trouble swallowing and voice change. Eyes:  Negative for photophobia, pain, discharge, redness, itching and visual disturbance. Respiratory:  Positive for cough and wheezing. Negative for apnea, choking, chest tightness and shortness of breath. Cardiovascular:  Negative for chest pain, palpitations and leg swelling. Gastrointestinal:  Negative for abdominal distention, abdominal pain, anal bleeding, blood in stool, constipation, diarrhea, nausea, rectal pain and vomiting. Endocrine: Negative for cold intolerance and heat intolerance. Genitourinary:  Negative for decreased urine volume, difficulty urinating, dysuria, flank pain, frequency, genital sores, hematuria, menstrual problem, pelvic pain, urgency, vaginal bleeding, vaginal discharge and vaginal pain. Musculoskeletal:  Positive for arthralgias, back pain and myalgias. Negative for gait problem, joint swelling, neck pain and neck stiffness. Patient sees Orthopedics for her pain   Skin:  Negative for rash and wound. Neurological:  Positive for headaches. Negative for dizziness, tremors, seizures, syncope, facial asymmetry, speech difficulty, weakness, light-headedness and numbness. Hematological:  Negative for adenopathy. Does not bruise/bleed easily. Psychiatric/Behavioral:  Positive for sleep disturbance. Negative for decreased concentration and dysphoric mood. The patient is not nervous/anxious. All other systems reviewed and are negative. No Known Allergies      Prior to Visit Medications    Medication Sig Taking? Authorizing Provider   methylPREDNISolone (MEDROL, WENDY,) 4 MG tablet By mouth.  Yes Sirisha Lennon Joanna Brandon MD   losartan (COZAAR) 50 MG tablet Take 1 tablet by mouth daily Yes Johanna cShmitt MD   traZODone (DESYREL) 150 MG tablet TAKE ONE TABLET BY MOUTH ONCE NIGHTLY Yes Johanna Schmitt MD   amLODIPine (NORVASC) 5 MG tablet TAKE ONE TABLET BY MOUTH DAILY Yes Johanna Schmitt MD   levothyroxine (SYNTHROID) 88 MCG tablet TAKE ONE TABLET BY MOUTH DAILY Yes Johanna Schmitt MD   vitamin D 25 MCG (1000 UT) CAPS Take 1,000 Units by mouth daily Yes Historical Provider, MD   diclofenac (VOLTAREN) 50 MG EC tablet Take 1 tablet by mouth 2 times daily Yes Arvind Medellin MD   metoprolol succinate (TOPROL XL) 25 MG extended release tablet TAKE 1/2 TABLET BY MOUTH DAILY Yes Johanna Schmitt MD   pravastatin (PRAVACHOL) 10 MG tablet TAKE ONE TABLET BY MOUTH EVERY OTHER DAY Yes Johanna Schmitt MD   aspirin 81 MG EC tablet Take 1 tablet by mouth daily Yes Trenton Tiwari DO   progesterone (PROMETRIUM) 200 MG capsule Take 200 mg by mouth nightly Yes Historical Provider, MD   cyclobenzaprine (FLEXERIL) 10 MG tablet Take 1 tablet by mouth nightly as needed for Muscle spasms  Patient not taking: Reported on 2022  Arvind Medellin MD         Past Medical History:   Diagnosis Date    Acquired hypothyroidism 2019    Asthma     Essential hypertension 2019    Osteoarthritis        Past Surgical History:   Procedure Laterality Date    DILATION AND CURETTAGE OF UTERUS           Family History   Problem Relation Age of Onset    Diabetes Mother     Hypertension Mother     Heart Attack Mother     Hypertension Father     Osteoarthritis Other     Stroke Other     Arthritis Sister        Social History     Tobacco Use    Smoking status: Former     Packs/day: 0.00     Years: 0.00     Pack years: 0.00     Types: Cigarettes     Quit date:      Years since quittin.8    Smokeless tobacco: Never   Vaping Use    Vaping Use: Never used   Substance Use Topics    Alcohol use:  Yes Alcohol/week: 1.0 standard drink     Types: 1 Glasses of wine per week    Drug use: Never       Objective   /84   Pulse 61   Temp 97.1 °F (36.2 °C)   Resp 16   Ht 5' 2\" (1.575 m)   Wt 171 lb (77.6 kg)   SpO2 97%   BMI 31.28 kg/m²   Wt Readings from Last 3 Encounters:   11/04/22 171 lb (77.6 kg)   10/24/22 173 lb 15.1 oz (78.9 kg)   07/19/22 173 lb 15.1 oz (78.9 kg)     Additional Measurements    11/04/22 0849   Waist (Inches): 42 in         Physical Exam  Constitutional:       General: She is not in acute distress. Appearance: Normal appearance. Comments: Hoarse voice   HENT:      Head: Normocephalic and atraumatic. Right Ear: Tympanic membrane, ear canal and external ear normal.      Left Ear: Tympanic membrane, ear canal and external ear normal.      Nose: Nose normal.   Eyes:      General: Lids are normal.      Extraocular Movements: Extraocular movements intact. Conjunctiva/sclera: Conjunctivae normal.      Pupils: Pupils are equal, round, and reactive to light. Neck:      Thyroid: No thyromegaly. Vascular: No carotid bruit. Cardiovascular:      Rate and Rhythm: Normal rate and regular rhythm. Heart sounds: Normal heart sounds, S1 normal and S2 normal. No murmur heard. Pulmonary:      Effort: Pulmonary effort is normal.      Breath sounds: Normal breath sounds. Abdominal:      General: Bowel sounds are normal.      Palpations: Abdomen is soft. There is no hepatomegaly or splenomegaly. Tenderness: There is no abdominal tenderness. Musculoskeletal:      Right shoulder: No tenderness. Normal range of motion. Left shoulder: No tenderness. Normal range of motion. Right elbow: No tenderness. Left elbow: No tenderness. Right wrist: No swelling or tenderness. Left wrist: No swelling or tenderness. Right hand: No swelling or tenderness. Left hand: No swelling or tenderness. Cervical back: Neck supple. No tenderness. Thoracic back: No tenderness. Lumbar back: No tenderness. Normal range of motion. Right hip: No tenderness. Left hip: No tenderness. Right knee: No tenderness. Left knee: No tenderness. Right lower leg: No edema. Left lower leg: No edema. Right ankle: No swelling. No tenderness. Left ankle: No swelling. No tenderness. Right foot: No swelling. Left foot: No swelling. Lymphadenopathy:      Head:      Right side of head: No submandibular adenopathy. Left side of head: No submandibular adenopathy. Skin:     General: Skin is warm and dry. Neurological:      Mental Status: She is alert and oriented to person, place, and time. Gait: Gait normal.      Deep Tendon Reflexes: Reflexes are normal and symmetric. Psychiatric:         Attention and Perception: Attention normal.         Mood and Affect: Mood normal.         Speech: Speech normal.         Lab Review   No visits with results within 6 Month(s) from this visit. Latest known visit with results is:   Orders Only on 02/15/2022   Component Date Value    Total CK 02/15/2022 176     Hemoglobin A1C 02/15/2022 5.7     eAG 02/15/2022 116.9     Cholesterol, Total 02/15/2022 161     Triglycerides 02/15/2022 79     HDL 02/15/2022 60     LDL Calculated 02/15/2022 85     VLDL Cholesterol Calcula* 02/15/2022 16     Sodium 02/15/2022 142     Potassium 02/15/2022 4.6     Chloride 02/15/2022 105     CO2 02/15/2022 24     Anion Gap 02/15/2022 13     Glucose 02/15/2022 110 (A)     BUN 02/15/2022 16     Creatinine 02/15/2022 0.7     GFR Non- 02/15/2022 >60     GFR  02/15/2022 >60     Calcium 02/15/2022 9.2     Total Protein 02/15/2022 6.4     Albumin 02/15/2022 4.5     Albumin/Globulin Ratio 02/15/2022 2.4 (A)     Total Bilirubin 02/15/2022 0.3     Alkaline Phosphatase 02/15/2022 98     ALT 02/15/2022 13     AST 02/15/2022 13 (A)          Assessment   Plan   1.  Encounter for well adult exam without abnormal findings  - CBC with Auto Differential; Future  - Comprehensive Metabolic Panel; Future  - Lipid Panel; Future  - TSH; Future  - Urinalysis with Reflex to Culture; Future  -Pap smear done on 1/28/22  -Mammogram done on 1/21/22  -COVID-19 vaccine done on 4/1/2021 and 4/29/2021  -Patient declines Shingrix vaccine  -Patient declines flu shot  -Tdap done on 3/16/2018  -Colonoscopy done on 4/12/2018    2. Essential hypertension  -stable  -Continue losartan (COZAAR) 50 MG tablet; Take 1 tablet by mouth daily  Dispense: 90 tablet; Refill: 1  - Continue amLODIPine (NORVASC) 5 MG tablet; TAKE ONE TABLET BY MOUTH DAILY  Dispense: 90 tablet; Refill: 1  -Continue metoprolol succinate (TOPROL XL) 25 MG extended release tablet; TAKE 1/2 TABLET BY MOUTH DAILY  Dispense: 45 tablet; Refill: 1  -Low sodium diet  -Regular aerobic exercise    3. Acquired hypothyroidism  -stable  -Continue levothyroxine (SYNTHROID) 88 MCG tablet; TAKE ONE TABLET BY MOUTH DAILY  Dispense: 90 tablet; Refill: 1  - TSH; Future    4. Prediabetes  -Stable  -Low carbohydrate diet  -Regular aerobic exercise  - Hemoglobin A1C; Future    5. Nonocclusive coronary atherosclerosis of native coronary artery  -Stable  -Continue pravastatin (PRAVACHOL) 10 MG tablet; TAKE ONE TABLET BY MOUTH EVERY OTHER DAY  Dispense: 45 tablet; Refill: 1  -Continue aspirin 81 mg once daily  -Continue care per Cardiology    6. Upper respiratory tract infection, unspecified type  - azithromycin (ZITHROMAX) 250 MG tablet; Take 2 tabs (500 mg) on Day 1, and take 1 tab (250 mg) on days 2 through 5. Dispense: 1 packet; Refill: 0  - brompheniramine-pseudoephedrine-DM (BROMFED DM) 2-30-10 MG/5ML syrup; Take 5 mLs by mouth 4 times daily as needed for Congestion or Cough  Dispense: 120 mL; Refill: 0    7. Bronchitis  - azithromycin (ZITHROMAX) 250 MG tablet; Take 2 tabs (500 mg) on Day 1, and take 1 tab (250 mg) on days 2 through 5. Dispense: 1 packet;  Refill: 0  - brompheniramine-pseudoephedrine-DM (BROMFED DM) 2-30-10 MG/5ML syrup; Take 5 mLs by mouth 4 times daily as needed for Congestion or Cough  Dispense: 120 mL; Refill: 0  - albuterol sulfate HFA (PROAIR HFA) 108 (90 Base) MCG/ACT inhaler; Inhale 2 puffs into the lungs every 6 hours as needed for Wheezing  Dispense: 18 g; Refill: 0    8. Primary insomnia  -Not controlled  -Increase trazodone to 200 mg nightly      Personalized Preventive Plan   Current Health Maintenance Status  Immunization History   Administered Date(s) Administered    COVID-19, MODERNA BLUE border, Primary or Immunocompromised, (age 12y+), IM, 100 mcg/0.5mL 04/01/2021, 04/29/2021    Tdap (Boostrix, Adacel) 03/10/2008, 03/10/2008, 03/16/2018        Health Maintenance   Topic Date Due    HIV screen  Never done    COVID-19 Vaccine (3 - Booster for Moderna series) 06/24/2021    Shingles vaccine (1 of 2) 02/15/2023 (Originally 2/6/2008)    Flu vaccine (1) 11/04/2023 (Originally 8/1/2022)    Depression Screen  02/15/2023    A1C test (Diabetic or Prediabetic)  11/04/2023    Lipids  11/04/2023    Breast cancer screen  01/21/2024    Cervical cancer screen  01/28/2025    DTaP/Tdap/Td vaccine (3 - Td or Tdap) 03/16/2028    Colorectal Cancer Screen  04/12/2028    Hepatitis C screen  Completed    Hepatitis A vaccine  Aged Out    Hib vaccine  Aged Out    Meningococcal (ACWY) vaccine  Aged Out    Pneumococcal 0-64 years Vaccine  Aged Out     Recommendations for [employer] Due: see orders and patient instructions/AVS.    Return in about 6 months (around 5/4/2023) for hypertension, insomnia, hypothyroidism, and prediabetes.

## 2022-11-04 NOTE — LETTER
[address] [place] [address]  Phone: [phone]  Fax: [phone]    Cheli Saleem MD        November 4, 2022     Patient: eMl Paul   YOB: 1960   Date of Visit: 11/4/2022       To Whom It May Concern: It is my medical opinion that Mai Simmons should remain out of work until 11/7/22 due to illness. She may return to work on 11/8/22. .    If you have any questions or concerns, please don't hesitate to call.     Sincerely,        Cheli Saleem MD

## 2022-11-04 NOTE — PATIENT INSTRUCTIONS
Well Visit, Ages 25 to 72: Care Instructions  Well visits can help you stay healthy. Your doctor has checked your overall health and may have suggested ways to take good care of yourself. Your doctor also may have recommended tests. You can help prevent illness with healthy eating, good sleep, vaccinations, regular exercise, and other steps. Get the tests that you and your doctor decide on. Depending on your age and risks, examples might include screening for diabetes; hepatitis C; HIV; and cervical, breast, lung, and colon cancer. Screening helps find diseases before any symptoms appear. Eat healthy foods. Choose fruits, vegetables, whole grains, lean protein, and low-fat dairy foods. Limit saturated fat and reduce salt. Limit alcohol. Men should have no more than 2 drinks a day. Women should have no more than 1. For some people, no alcohol is the best choice. Exercise. Get at least 30 minutes of exercise on most days of the week. Walking can be a good choice. Reach and stay at your healthy weight. This will lower your risk for many health problems. Take care of your mental health. Try to stay connected with friends, family, and community, and find ways to manage stress. If you're feeling depressed or hopeless, talk to someone. A counselor can help. If you don't have a counselor, talk to your doctor. Talk to your doctor if you think you may have a problem with alcohol or drug use. This includes prescription medicines and illegal drugs. Avoid tobacco and nicotine: Don't smoke, vape, or chew. If you need help quitting, talk to your doctor. Practice safer sex. Getting tested, using condoms or dental dams, and limiting sex partners can help prevent STIs. Use birth control if it's important to you to prevent pregnancy. Talk with your doctor about your choices and what might be best for you. Prevent problems where you can.  Protect your skin from too much sun, wash your hands, brush your teeth twice a day, and wear a seat belt in the car. Where can you learn more? Go to [email or web address] org and sign in to your [employer] account. Enter P072 in the [facility] box to learn more about \"Well Visit, Ages 25 to 72: Care Instructions. \"     If you do not have an account, please click on the \"Sign Up Now\" link. Current as of: March 9, 2022               Content Version: 13.4  © 3765-6531 [employer], [employer]. Care instructions adapted under license by [facility] ([facility]). If you have questions about a medical condition or this instruction, always ask your healthcare professional. [address] any warranty or liability for your use of this information.

## 2022-11-09 ENCOUNTER — TELEPHONE (OUTPATIENT)
Dept: ORTHOPEDIC SURGERY | Age: 62
End: 2022-11-09

## 2022-11-09 NOTE — TELEPHONE ENCOUNTER
General Question     Subject: MRI FOR LUMBAR  Patient and /or Facility Request: PATIENT STATES SHE NEEDS VOLUME CALLED IN FOR HER MRI Thursday TO [facility] FOR HER MRI ON Friday.  [address] [place] [address] [address] [facility]  Contact Number:  [phone]

## 2022-11-10 ENCOUNTER — TELEPHONE (OUTPATIENT)
Dept: ORTHOPEDIC SURGERY | Age: 62
End: 2022-11-10

## 2022-11-10 DIAGNOSIS — F40.240 CLAUSTROPHOBIA: Primary | ICD-10-CM

## 2022-11-10 RX ORDER — DIAZEPAM 5 MG/1
TABLET ORAL
Qty: 2 TABLET | Refills: 0 | Status: SHIPPED | OUTPATIENT
Start: 2022-11-10 | End: 2022-11-24

## 2022-11-10 NOTE — TELEPHONE ENCOUNTER
General Question     Subject: NEEDS SCRIPT FOR VALIUM FOR MRI  Patient and /or Facility Request: Mary Pruett  Contact Number: [phone]      THE PT CALLED AGAIN. SHE CALLED YESTERDAY. SHE NEEDS A SCRIPT FOR VALIUM CALLED IN TO HER PHARMACY TODAY, SO THAT SHE CAN TAKE IT BEFORE HER MRI TOMORROW MORNING AT 0730.     PLEASE RETURN HER CALL AT THE ABOVE PHONE #

## 2022-11-16 ENCOUNTER — TELEPHONE (OUTPATIENT)
Dept: ORTHOPEDIC SURGERY | Age: 62
End: 2022-11-16

## 2022-11-16 NOTE — TELEPHONE ENCOUNTER
General Question     Subject: RETURNING CHRISSY'S CALL  Patient and /or Facility Request: [facility] Number: [phone]      THE PT IS RETURNING CHRISSY'S PHONE CALL.   THE PATIENT CAN BE REACHED AT ANYTIME

## 2022-11-16 NOTE — TELEPHONE ENCOUNTER
JOVITA and she now has the valium and her MRI is scheduled for 12/02/2022. Will call to schedule a follow up after the MRI to go over the results.

## 2022-11-17 DIAGNOSIS — F51.01 PRIMARY INSOMNIA: ICD-10-CM

## 2022-11-17 RX ORDER — TRAZODONE HYDROCHLORIDE 150 MG/1
TABLET ORAL
Qty: 30 TABLET | Refills: 0 | OUTPATIENT
Start: 2022-11-17

## 2022-11-17 RX ORDER — TRAZODONE HYDROCHLORIDE 100 MG/1
200 TABLET ORAL NIGHTLY
Qty: 180 TABLET | Refills: 1 | Status: SHIPPED | OUTPATIENT
Start: 2022-11-17

## 2022-11-17 NOTE — TELEPHONE ENCOUNTER
Medication:   Requested Prescriptions     Pending Prescriptions Disp Refills    traZODone (DESYREL) 150 MG tablet [Pharmacy Med Name: traZODone 150 MG TABLET] 30 tablet 0     Sig: TAKE ONE TABLET BY MOUTH ONCE NIGHTLY     Last Filled: 10.20.22    Last appt: 11/4/2022   Next appt: 5/5/2023    Last OARRS: No flowsheet data found.

## 2022-11-18 NOTE — TELEPHONE ENCOUNTER
Prescription refill for Trazodone 150mg denied because dose increased to Trazodone 200mg nightly. Prescription for Trazodone 100mg 2 tablets nightly sent to patient's pharmacy.

## 2022-12-12 ENCOUNTER — OFFICE VISIT (OUTPATIENT)
Dept: ORTHOPEDIC SURGERY | Age: 62
End: 2022-12-12
Payer: COMMERCIAL

## 2022-12-12 DIAGNOSIS — M48.061 DEGENERATIVE LUMBAR SPINAL STENOSIS: Primary | ICD-10-CM

## 2022-12-12 DIAGNOSIS — M51.36 DDD (DEGENERATIVE DISC DISEASE), LUMBAR: ICD-10-CM

## 2022-12-12 DIAGNOSIS — M79.2 NEUROGENIC PAIN OF LOWER EXTREMITY, RIGHT: ICD-10-CM

## 2022-12-12 DIAGNOSIS — M47.816 LUMBAR SPONDYLOSIS: ICD-10-CM

## 2022-12-12 PROCEDURE — 99213 OFFICE O/P EST LOW 20 MIN: CPT | Performed by: INTERNAL MEDICINE

## 2022-12-15 NOTE — PROGRESS NOTES
Chief Complaint:   Chief Complaint   Patient presents with    Lower Back Pain          History of Present Illness:       Patient is a 57 y.o. female returns follow up for the above complaint. The patient was last seen approximately 6 weeksago. The symptoms show no change since the last visit. The patient has had further testing for the problem. She was unable to complete the course of Medrol related to concurrent illness. .  Use of Flexeril    MRI completed in the interim    Back:Right leg pain 10:90. Pain in back and leg is aching in quality. Lower limb symptoms seem to follow a neurogenic claudication pattern and L5 distribution radiating to the foreleg. Pain levels:6.     The patient does not continue with supervised PT. She continues with I HEP. The patient denies new onset or progressive weakness of the lower extremities. The patient denies new onset bowel or bladder dysfunction.     No reliance on analgesics     Past Medical History:        Past Medical History:   Diagnosis Date    Acquired hypothyroidism 11/25/2019    Asthma     Essential hypertension 11/25/2019    Osteoarthritis         Present Medications:         Current Outpatient Medications   Medication Sig Dispense Refill    traZODone (DESYREL) 100 MG tablet Take 2 tablets by mouth nightly 180 tablet 1    brompheniramine-pseudoephedrine-DM (BROMFED DM) 2-30-10 MG/5ML syrup Take 5 mLs by mouth 4 times daily as needed for Congestion or Cough 120 mL 0    albuterol sulfate HFA (PROAIR HFA) 108 (90 Base) MCG/ACT inhaler Inhale 2 puffs into the lungs every 6 hours as needed for Wheezing 18 g 0    losartan (COZAAR) 50 MG tablet Take 1 tablet by mouth daily 90 tablet 1    amLODIPine (NORVASC) 5 MG tablet TAKE ONE TABLET BY MOUTH DAILY 90 tablet 1    levothyroxine (SYNTHROID) 88 MCG tablet TAKE ONE TABLET BY MOUTH DAILY 90 tablet 1    metoprolol succinate (TOPROL XL) 25 MG extended release tablet TAKE 1/2 TABLET BY MOUTH DAILY 45 tablet 1 pravastatin (PRAVACHOL) 10 MG tablet TAKE ONE TABLET BY MOUTH EVERY OTHER DAY 45 tablet 1    methylPREDNISolone (MEDROL, WENDY,) 4 MG tablet By mouth. 1 kit 0    cyclobenzaprine (FLEXERIL) 10 MG tablet Take 1 tablet by mouth nightly as needed for Muscle spasms (Patient not taking: Reported on 11/4/2022) 30 tablet 1    traZODone (DESYREL) 150 MG tablet TAKE ONE TABLET BY MOUTH ONCE NIGHTLY 30 tablet 0    vitamin D 25 MCG (1000 UT) CAPS Take 1,000 Units by mouth daily      aspirin 81 MG EC tablet Take 1 tablet by mouth daily 30 tablet 3    progesterone (PROMETRIUM) 200 MG capsule Take 200 mg by mouth nightly       No current facility-administered medications for this visit. Allergies:      No Known Allergies        Review of Systems:    Pertinent items are noted in HPI        Vital Signs: There were no vitals filed for this visit. General Exam:     Constitutional: Patient is adequately groomed with no evidence of malnutrition    Physical Exam: lower back      Primary Exam:    Inspection: No deformity atrophy appreciable curvature      Palpation: No focal trigger point tenderness      Range of Motion: 80/30 pain with extension      Strength: Normal lower extremity      Special Tests: Negative SLR      Skin: There are no rashes, ulcerations or lesions.       Gait: Nonantalgic     Neurovascular - non focal and intact       Additional Comments:        Additional Examinations:               Office Imaging Results/Procedures PerformedToday:            Office Procedures:     Orders Placed This Encounter   Procedures    [address] ([facility])-[facility]     Referral Priority:   Routine     Referral Type:   Eval and Treat     Referral Reason:   Specialty Services Required     Requested Specialty:   Physical Therapist     Number of Visits Requested:   1           Other Outside Imaging and Testing Personally Reviewed:       [address] [facility]   [place] Patient Name: Fariha Romano   Case ID: [id number]   Patient : 1960   Referring Physician: Mai Good MD   Exam Date: 2022   Exam Description: MR Lumbar Spine w/o Contrast            HISTORY:  Low back pain. TECHNICAL FACTORS:  Long- and short-axis fat- and water-weighted images were performed. COMPARISON:  None. FINDINGS:  The L5-S1 level shows degenerative disease without focal disc herniation or central    spinal stenosis. Facet arthropathy. The neural foramina patent. The L4-5 level shows mild central spinal stenosis secondary to degenerative disease,    ligamentous hypertrophy and facet arthropathy. The L3-4, L2-3 levels show degenerative disease without focal disc herniation or central spinal    stenosis. The neural foramina patent. The L1-2 and T12 levels are normal.       The conus and cauda equina are normal.       Paravertebral soft tissues are normal.       No fracture dislocation identified. CONCLUSION:   1. Degenerative disc disease and spondylosis involving lumbar spine producing mild L4-5 central    spinal stenosis. 2. Normal conus and cauda equina. Thank you for the opportunity to provide your interpretation. Anurag Sanders MD       A: WENDY 2022 4:29 PM                 Assessment   Impression: . Encounter Diagnoses   Name Primary? Degenerative lumbar spinal stenosis Yes    Neurogenic pain of lower extremity, right     DDD (degenerative disc disease), lumbar     Lumbar spondylosis               Plan:       Formal course of PT lumbar stabilization program  Consider her candidate for lumbar spine intervention injection as needed  Lumbar spine stabilization home exercise program and activity modification lumbar spine precautions  Consider EMG evaluation right lower extremity.     Overall the lower symptoms seem to follow a neurogenic claudication pattern and was unlikely related to the spinal stenosis at L4/L5 proceed as outlined above     Orders:        Orders Placed This Encounter   Procedures    [address] ([facility]     Referral Priority:   Routine     Referral Type:   Eval and Treat     Referral Reason:   Specialty Services Required     Requested Specialty:   Physical Therapist     Number of Visits Requested:   1         Iker Rincon MD.      Ovidio Heaps: \"This note was dictated with voice recognition software. Though review and correction are routine, we apologize for any errors. \"

## 2023-03-31 ENCOUNTER — OFFICE VISIT (OUTPATIENT)
Dept: PRIMARY CARE CLINIC | Age: 63
End: 2023-03-31
Payer: COMMERCIAL

## 2023-03-31 VITALS
WEIGHT: 179.6 LBS | TEMPERATURE: 97.2 F | HEART RATE: 71 BPM | DIASTOLIC BLOOD PRESSURE: 80 MMHG | OXYGEN SATURATION: 96 % | BODY MASS INDEX: 32.85 KG/M2 | SYSTOLIC BLOOD PRESSURE: 120 MMHG

## 2023-03-31 DIAGNOSIS — J06.9 UPPER RESPIRATORY TRACT INFECTION, UNSPECIFIED TYPE: Primary | ICD-10-CM

## 2023-03-31 DIAGNOSIS — M54.2 NECK PAIN: ICD-10-CM

## 2023-03-31 DIAGNOSIS — J40 BRONCHITIS: ICD-10-CM

## 2023-03-31 PROCEDURE — 3078F DIAST BP <80 MM HG: CPT | Performed by: INTERNAL MEDICINE

## 2023-03-31 PROCEDURE — 1123F ACP DISCUSS/DSCN MKR DOCD: CPT | Performed by: INTERNAL MEDICINE

## 2023-03-31 PROCEDURE — 99213 OFFICE O/P EST LOW 20 MIN: CPT | Performed by: INTERNAL MEDICINE

## 2023-03-31 PROCEDURE — 3074F SYST BP LT 130 MM HG: CPT | Performed by: INTERNAL MEDICINE

## 2023-03-31 RX ORDER — ALBUTEROL SULFATE 90 UG/1
2 AEROSOL, METERED RESPIRATORY (INHALATION) EVERY 6 HOURS PRN
Qty: 18 G | Refills: 0 | Status: SHIPPED | OUTPATIENT
Start: 2023-03-31

## 2023-03-31 RX ORDER — BROMPHENIRAMINE MALEATE, PSEUDOEPHEDRINE HYDROCHLORIDE, AND DEXTROMETHORPHAN HYDROBROMIDE 2; 30; 10 MG/5ML; MG/5ML; MG/5ML
5 SYRUP ORAL 4 TIMES DAILY PRN
Qty: 120 ML | Refills: 0 | Status: SHIPPED | OUTPATIENT
Start: 2023-03-31

## 2023-03-31 RX ORDER — AZITHROMYCIN 250 MG/1
TABLET, FILM COATED ORAL
Qty: 1 PACKET | Refills: 0 | Status: SHIPPED | OUTPATIENT
Start: 2023-03-31 | End: 2023-04-10

## 2023-03-31 SDOH — ECONOMIC STABILITY: INCOME INSECURITY: HOW HARD IS IT FOR YOU TO PAY FOR THE VERY BASICS LIKE FOOD, HOUSING, MEDICAL CARE, AND HEATING?: SOMEWHAT HARD

## 2023-03-31 SDOH — ECONOMIC STABILITY: FOOD INSECURITY: WITHIN THE PAST 12 MONTHS, YOU WORRIED THAT YOUR FOOD WOULD RUN OUT BEFORE YOU GOT MONEY TO BUY MORE.: NEVER TRUE

## 2023-03-31 SDOH — ECONOMIC STABILITY: FOOD INSECURITY: WITHIN THE PAST 12 MONTHS, THE FOOD YOU BOUGHT JUST DIDN'T LAST AND YOU DIDN'T HAVE MONEY TO GET MORE.: NEVER TRUE

## 2023-03-31 ASSESSMENT — ENCOUNTER SYMPTOMS
VOMITING: 0
WHEEZING: 0
DIARRHEA: 0
SORE THROAT: 0
SINUS PAIN: 0
SHORTNESS OF BREATH: 0
SINUS PRESSURE: 0
NAUSEA: 0
ABDOMINAL PAIN: 0

## 2023-03-31 ASSESSMENT — PATIENT HEALTH QUESTIONNAIRE - PHQ9
SUM OF ALL RESPONSES TO PHQ9 QUESTIONS 1 & 2: 1
SUM OF ALL RESPONSES TO PHQ QUESTIONS 1-9: 1
2. FEELING DOWN, DEPRESSED OR HOPELESS: 0
1. LITTLE INTEREST OR PLEASURE IN DOING THINGS: 1

## 2023-03-31 NOTE — PROGRESS NOTES
oz (81.5 kg)      Body mass index is 32.85 kg/m². Wt Readings from Last 3 Encounters:   03/31/23 179 lb 9.6 oz (81.5 kg)   11/04/22 171 lb (77.6 kg)   10/24/22 173 lb 15.1 oz (78.9 kg)       Physical Exam  Nursing note reviewed. Constitutional:       General: She is not in acute distress. Appearance: Normal appearance. She is well-developed. HENT:      Right Ear: Tympanic membrane and ear canal normal.      Left Ear: Tympanic membrane and ear canal normal.      Mouth/Throat:      Pharynx: Oropharynx is clear. Eyes:      General: Lids are normal.      Extraocular Movements: Extraocular movements intact. Conjunctiva/sclera: Conjunctivae normal.      Pupils: Pupils are equal, round, and reactive to light. Neck:      Thyroid: No thyromegaly. Cardiovascular:      Rate and Rhythm: Normal rate and regular rhythm. Heart sounds: Normal heart sounds, S1 normal and S2 normal. No murmur heard. Pulmonary:      Effort: Pulmonary effort is normal.      Breath sounds: Normal breath sounds. No wheezing, rhonchi or rales. Musculoskeletal:      Cervical back: Neck supple. Tenderness (left neck) present. Neurological:      Mental Status: She is alert. No results found for this visit on 03/31/23.   Lab Review   Orders Only on 11/04/2022   Component Date Value    Cholesterol, Total 11/04/2022 134     Triglycerides 11/04/2022 64     HDL 11/04/2022 45     LDL Calculated 11/04/2022 76     VLDL Cholesterol Calcula* 11/04/2022 13     TSH 11/04/2022 2.35     Sodium 11/04/2022 142     Potassium 11/04/2022 4.9     Chloride 11/04/2022 103     CO2 11/04/2022 29     Anion Gap 11/04/2022 10     Glucose 11/04/2022 101 (H)     BUN 11/04/2022 11     Creatinine 11/04/2022 0.8     Est, Glom Filt Rate 11/04/2022 >60     Calcium 11/04/2022 9.8     Total Protein 11/04/2022 6.4     Albumin 11/04/2022 4.6     Albumin/Globulin Ratio 11/04/2022 2.6 (H)     Total Bilirubin 11/04/2022 0.4     Alkaline Phosphatase 11/04/2022

## 2023-04-04 ASSESSMENT — ENCOUNTER SYMPTOMS
RHINORRHEA: 1
COUGH: 1

## 2023-04-18 ENCOUNTER — TELEPHONE (OUTPATIENT)
Dept: PRIMARY CARE CLINIC | Age: 63
End: 2023-04-18

## 2023-04-18 ENCOUNTER — HOSPITAL ENCOUNTER (OUTPATIENT)
Dept: GENERAL RADIOLOGY | Age: 63
Discharge: HOME | End: 2023-04-18
Payer: COMMERCIAL

## 2023-04-18 DIAGNOSIS — R07.9 CHEST PAIN, UNSPECIFIED TYPE: ICD-10-CM

## 2023-04-18 DIAGNOSIS — R07.9 CHEST PAIN, UNSPECIFIED TYPE: Primary | ICD-10-CM

## 2023-04-18 PROCEDURE — 71046 X-RAY EXAM CHEST 2 VIEWS: CPT

## 2023-04-18 NOTE — TELEPHONE ENCOUNTER
Patient called into nurse triage c/o chest pain when breathing in and bending over. She was seen 3/31/22 for upper respiratory infection and bronchitis. She is asking if a chest xray can be ordered, said this was discussed at her appointment with PCP but they wanted to try the z pack first to see if it would help.  Please advise

## 2023-04-18 NOTE — TELEPHONE ENCOUNTER
Spoke with patient. Patient states the [facility] took a long time and she just stopped coughing up green phlegm last week. Patient states her chest starting a couple of days ago. Patient states chest hurts on the right side if she takes a deep breath. Cough resolved. Patient denies wheezing and SOB. Chest xray ordered. Patient will have done today.

## 2023-05-05 ENCOUNTER — OFFICE VISIT (OUTPATIENT)
Dept: PRIMARY CARE CLINIC | Age: 63
End: 2023-05-05
Payer: MEDICARE

## 2023-05-05 VITALS
WEIGHT: 175.4 LBS | OXYGEN SATURATION: 97 % | BODY MASS INDEX: 32.08 KG/M2 | SYSTOLIC BLOOD PRESSURE: 118 MMHG | DIASTOLIC BLOOD PRESSURE: 76 MMHG | HEART RATE: 67 BPM

## 2023-05-05 DIAGNOSIS — F51.01 PRIMARY INSOMNIA: ICD-10-CM

## 2023-05-05 DIAGNOSIS — Z78.0 POSTMENOPAUSE: ICD-10-CM

## 2023-05-05 DIAGNOSIS — I10 ESSENTIAL HYPERTENSION: ICD-10-CM

## 2023-05-05 DIAGNOSIS — E03.9 ACQUIRED HYPOTHYROIDISM: ICD-10-CM

## 2023-05-05 DIAGNOSIS — I10 ESSENTIAL HYPERTENSION: Primary | ICD-10-CM

## 2023-05-05 DIAGNOSIS — R73.03 PREDIABETES: ICD-10-CM

## 2023-05-05 DIAGNOSIS — I25.10 NONOCCLUSIVE CORONARY ATHEROSCLEROSIS OF NATIVE CORONARY ARTERY: ICD-10-CM

## 2023-05-05 LAB
ANION GAP SERPL CALCULATED.3IONS-SCNC: 9 MMOL/L (ref 3–16)
BUN SERPL-MCNC: 11 MG/DL (ref 7–20)
CALCIUM SERPL-MCNC: 9.6 MG/DL (ref 8.3–10.6)
CHLORIDE SERPL-SCNC: 101 MMOL/L (ref 99–110)
CO2 SERPL-SCNC: 26 MMOL/L (ref 21–32)
CREAT SERPL-MCNC: 0.8 MG/DL (ref 0.6–1.2)
GFR SERPLBLD CREATININE-BSD FMLA CKD-EPI: >60 ML/MIN/{1.73_M2}
GLUCOSE SERPL-MCNC: 108 MG/DL (ref 70–99)
POTASSIUM SERPL-SCNC: 4.3 MMOL/L (ref 3.5–5.1)
SODIUM SERPL-SCNC: 136 MMOL/L (ref 136–145)
TSH SERPL DL<=0.005 MIU/L-ACNC: 0.71 UIU/ML (ref 0.27–4.2)

## 2023-05-05 PROCEDURE — 99214 OFFICE O/P EST MOD 30 MIN: CPT | Performed by: INTERNAL MEDICINE

## 2023-05-05 PROCEDURE — 3078F DIAST BP <80 MM HG: CPT | Performed by: INTERNAL MEDICINE

## 2023-05-05 PROCEDURE — 1123F ACP DISCUSS/DSCN MKR DOCD: CPT | Performed by: INTERNAL MEDICINE

## 2023-05-05 PROCEDURE — 3074F SYST BP LT 130 MM HG: CPT | Performed by: INTERNAL MEDICINE

## 2023-05-05 RX ORDER — METOPROLOL SUCCINATE 25 MG/1
TABLET, EXTENDED RELEASE ORAL
Qty: 45 TABLET | Refills: 1 | Status: SHIPPED | OUTPATIENT
Start: 2023-05-05

## 2023-05-05 RX ORDER — TRAZODONE HYDROCHLORIDE 100 MG/1
200 TABLET ORAL NIGHTLY
Qty: 180 TABLET | Refills: 1 | Status: SHIPPED | OUTPATIENT
Start: 2023-05-05

## 2023-05-05 RX ORDER — AMLODIPINE BESYLATE 5 MG/1
TABLET ORAL
Qty: 90 TABLET | Refills: 1 | Status: SHIPPED | OUTPATIENT
Start: 2023-05-05

## 2023-05-05 RX ORDER — LEVOTHYROXINE SODIUM 88 UG/1
TABLET ORAL
Qty: 90 TABLET | Refills: 1 | Status: SHIPPED | OUTPATIENT
Start: 2023-05-05

## 2023-05-05 RX ORDER — LOSARTAN POTASSIUM 50 MG/1
50 TABLET ORAL DAILY
Qty: 90 TABLET | Refills: 1 | Status: SHIPPED | OUTPATIENT
Start: 2023-05-05

## 2023-05-05 RX ORDER — PRAVASTATIN SODIUM 10 MG
10 TABLET ORAL DAILY
COMMUNITY

## 2023-05-06 LAB
EST. AVERAGE GLUCOSE BLD GHB EST-MCNC: 114 MG/DL
HBA1C MFR BLD: 5.6 %

## 2023-05-15 ENCOUNTER — TELEPHONE (OUTPATIENT)
Dept: PRIMARY CARE CLINIC | Age: 63
End: 2023-05-15

## 2023-05-15 DIAGNOSIS — I10 ESSENTIAL HYPERTENSION: ICD-10-CM

## 2023-05-15 NOTE — TELEPHONE ENCOUNTER
Pt called and stated her insurance will start up mail in scripts. Pt stated the insurance company will be calling the office.

## 2023-05-16 PROBLEM — I20.9 ACUTE ANGINA (HCC): Status: RESOLVED | Noted: 2020-07-24 | Resolved: 2023-05-16

## 2023-05-16 ASSESSMENT — ENCOUNTER SYMPTOMS
SINUS PRESSURE: 0
RHINORRHEA: 0
SHORTNESS OF BREATH: 0
NAUSEA: 0
SORE THROAT: 0
ABDOMINAL PAIN: 0
SINUS PAIN: 0
CHEST TIGHTNESS: 0
BLOOD IN STOOL: 0
COUGH: 0
DIARRHEA: 0
CONSTIPATION: 0
WHEEZING: 0
TROUBLE SWALLOWING: 0
VOMITING: 0

## 2023-05-19 ENCOUNTER — HOSPITAL ENCOUNTER (OUTPATIENT)
Dept: GENERAL RADIOLOGY | Age: 63
Discharge: HOME | End: 2023-05-19
Payer: MEDICARE

## 2023-05-19 DIAGNOSIS — Z78.0 POSTMENOPAUSE: ICD-10-CM

## 2023-05-19 PROCEDURE — 77080 DXA BONE DENSITY AXIAL: CPT

## 2023-05-22 DIAGNOSIS — I25.10 NONOCCLUSIVE CORONARY ATHEROSCLEROSIS OF NATIVE CORONARY ARTERY: Primary | ICD-10-CM

## 2023-05-22 DIAGNOSIS — F51.01 PRIMARY INSOMNIA: ICD-10-CM

## 2023-05-22 DIAGNOSIS — E03.9 ACQUIRED HYPOTHYROIDISM: ICD-10-CM

## 2023-05-22 DIAGNOSIS — I10 ESSENTIAL HYPERTENSION: ICD-10-CM

## 2023-05-22 RX ORDER — METOPROLOL SUCCINATE 25 MG/1
TABLET, EXTENDED RELEASE ORAL
Qty: 45 TABLET | Refills: 1 | Status: SHIPPED | OUTPATIENT
Start: 2023-05-22

## 2023-05-22 RX ORDER — LOSARTAN POTASSIUM 50 MG/1
50 TABLET ORAL DAILY
Qty: 90 TABLET | Refills: 1 | Status: SHIPPED | OUTPATIENT
Start: 2023-05-22

## 2023-05-22 RX ORDER — AMLODIPINE BESYLATE 5 MG/1
TABLET ORAL
Qty: 90 TABLET | Refills: 1 | Status: SHIPPED | OUTPATIENT
Start: 2023-05-22

## 2023-05-22 RX ORDER — PRAVASTATIN SODIUM 10 MG
10 TABLET ORAL DAILY
Qty: 90 TABLET | Refills: 1 | Status: SHIPPED | OUTPATIENT
Start: 2023-05-22

## 2023-05-22 RX ORDER — LEVOTHYROXINE SODIUM 88 UG/1
TABLET ORAL
Qty: 90 TABLET | Refills: 1 | Status: SHIPPED | OUTPATIENT
Start: 2023-05-22

## 2023-05-22 RX ORDER — TRAZODONE HYDROCHLORIDE 100 MG/1
200 TABLET ORAL NIGHTLY
Qty: 180 TABLET | Refills: 1 | Status: SHIPPED | OUTPATIENT
Start: 2023-05-22

## 2023-05-22 NOTE — TELEPHONE ENCOUNTER
Medication:   Requested Prescriptions     Pending Prescriptions Disp Refills    levothyroxine (SYNTHROID) 88 MCG tablet 90 tablet 1     Sig: TAKE ONE TABLET BY MOUTH DAILY    amLODIPine (NORVASC) 5 MG tablet 90 tablet 1     Sig: TAKE ONE TABLET BY MOUTH DAILY    losartan (COZAAR) 50 MG tablet 90 tablet 1     Sig: Take 1 tablet by mouth daily    metoprolol succinate (TOPROL XL) 25 MG extended release tablet 45 tablet 1     Sig: TAKE 1/2 TABLET BY MOUTH DAILY    pravastatin (PRAVACHOL) 10 MG tablet 90 tablet 1     Sig: Take 1 tablet by mouth daily    traZODone (DESYREL) 100 MG tablet 180 tablet 1     Sig: Take 2 tablets by mouth nightly     Last Filled:  5/5/23 5/5/23 5/5/23 5/5/23 5/5/23 5/5/23    Last appt: 5/5/2023   Next appt: 11/17/2023    Last Labs DM:   Lab Results   Component Value Date/Time    LABA1C 5.6 05/05/2023 11:29 AM     Last Lipid:   Lab Results   Component Value Date/Time    CHOL 134 11/04/2022 09:43 AM    TRIG 64 11/04/2022 09:43 AM    HDL 45 11/04/2022 09:43 AM    LDLCALC 76 11/04/2022 09:43 AM     Last PSA: No results found for: PSA  Last Thyroid:   Lab Results   Component Value Date/Time    TSH 0.71 05/05/2023 11:29 AM    W2GCRXU <0.10 08/17/2010 09:00 PM    G1WSJTF <0.10 08/17/2010 09:00 PM    T4FREE 1.5 05/18/2020 10:27 AM    K7KOTYP 0.6 08/17/2010 09:00 PM

## 2023-10-06 ENCOUNTER — HOSPITAL ENCOUNTER (OUTPATIENT)
Dept: GENERAL RADIOLOGY | Age: 63
Discharge: HOME | End: 2023-10-06
Payer: MEDICARE

## 2023-10-06 ENCOUNTER — OFFICE VISIT (OUTPATIENT)
Dept: PRIMARY CARE CLINIC | Age: 63
End: 2023-10-06
Payer: MEDICARE

## 2023-10-06 VITALS
RESPIRATION RATE: 16 BRPM | TEMPERATURE: 97.5 F | DIASTOLIC BLOOD PRESSURE: 80 MMHG | SYSTOLIC BLOOD PRESSURE: 124 MMHG | HEART RATE: 60 BPM | WEIGHT: 180 LBS | BODY MASS INDEX: 33.13 KG/M2 | HEIGHT: 62 IN

## 2023-10-06 DIAGNOSIS — R14.0 ABDOMINAL BLOATING: ICD-10-CM

## 2023-10-06 DIAGNOSIS — R10.9 ABDOMINAL CRAMPING: Primary | ICD-10-CM

## 2023-10-06 DIAGNOSIS — R14.3 EXCESSIVE GAS: ICD-10-CM

## 2023-10-06 DIAGNOSIS — R10.9 ABDOMINAL CRAMPING: ICD-10-CM

## 2023-10-06 DIAGNOSIS — I25.10 NONOCCLUSIVE CORONARY ATHEROSCLEROSIS OF NATIVE CORONARY ARTERY: ICD-10-CM

## 2023-10-06 LAB
ALBUMIN SERPL-MCNC: 4.8 G/DL (ref 3.4–5)
ALBUMIN/GLOB SERPL: 2.7 {RATIO} (ref 1.1–2.2)
ALP SERPL-CCNC: 99 U/L (ref 40–129)
ALT SERPL-CCNC: 16 U/L (ref 10–40)
ANION GAP SERPL CALCULATED.3IONS-SCNC: 9 MMOL/L (ref 3–16)
AST SERPL-CCNC: 16 U/L (ref 15–37)
BASOPHILS # BLD: 0 K/UL (ref 0–0.2)
BASOPHILS NFR BLD: 0.2 %
BILIRUB SERPL-MCNC: 0.5 MG/DL (ref 0–1)
BILIRUBIN, POC: NORMAL
BLOOD URINE, POC: NORMAL
BUN SERPL-MCNC: 13 MG/DL (ref 7–20)
CALCIUM SERPL-MCNC: 9.2 MG/DL (ref 8.3–10.6)
CHLORIDE SERPL-SCNC: 102 MMOL/L (ref 99–110)
CLARITY, POC: CLEAR
CO2 SERPL-SCNC: 30 MMOL/L (ref 21–32)
COLOR, POC: YELLOW
CREAT SERPL-MCNC: 0.8 MG/DL (ref 0.6–1.2)
DEPRECATED RDW RBC AUTO: 13.9 % (ref 12.4–15.4)
EOSINOPHIL # BLD: 0.1 K/UL (ref 0–0.6)
EOSINOPHIL NFR BLD: 1.8 %
GFR SERPLBLD CREATININE-BSD FMLA CKD-EPI: >60 ML/MIN/{1.73_M2}
GLUCOSE SERPL-MCNC: 121 MG/DL (ref 70–99)
GLUCOSE URINE, POC: NORMAL
HCT VFR BLD AUTO: 42.2 % (ref 36–48)
HGB BLD-MCNC: 14 G/DL (ref 12–16)
KETONES, POC: NORMAL
LEUKOCYTE EST, POC: NORMAL
LYMPHOCYTES # BLD: 1.4 K/UL (ref 1–5.1)
LYMPHOCYTES NFR BLD: 25.4 %
MCH RBC QN AUTO: 29.7 PG (ref 26–34)
MCHC RBC AUTO-ENTMCNC: 33.2 G/DL (ref 31–36)
MCV RBC AUTO: 89.3 FL (ref 80–100)
MONOCYTES # BLD: 0.4 K/UL (ref 0–1.3)
MONOCYTES NFR BLD: 7.8 %
NEUTROPHILS # BLD: 3.6 K/UL (ref 1.7–7.7)
NEUTROPHILS NFR BLD: 64.8 %
NITRITE, POC: NORMAL
PH, POC: 6.5
PLATELET # BLD AUTO: 209 K/UL (ref 135–450)
PMV BLD AUTO: 9.2 FL (ref 5–10.5)
POTASSIUM SERPL-SCNC: 4.5 MMOL/L (ref 3.5–5.1)
PROT SERPL-MCNC: 6.6 G/DL (ref 6.4–8.2)
PROTEIN, POC: NORMAL
RBC # BLD AUTO: 4.72 M/UL (ref 4–5.2)
SODIUM SERPL-SCNC: 141 MMOL/L (ref 136–145)
SPECIFIC GRAVITY, POC: 1.02
UROBILINOGEN, POC: 0.2
WBC # BLD AUTO: 5.5 K/UL (ref 4–11)

## 2023-10-06 PROCEDURE — 3078F DIAST BP <80 MM HG: CPT | Performed by: INTERNAL MEDICINE

## 2023-10-06 PROCEDURE — 99214 OFFICE O/P EST MOD 30 MIN: CPT | Performed by: INTERNAL MEDICINE

## 2023-10-06 PROCEDURE — 1123F ACP DISCUSS/DSCN MKR DOCD: CPT | Performed by: INTERNAL MEDICINE

## 2023-10-06 PROCEDURE — 81002 URINALYSIS NONAUTO W/O SCOPE: CPT | Performed by: INTERNAL MEDICINE

## 2023-10-06 PROCEDURE — 3074F SYST BP LT 130 MM HG: CPT | Performed by: INTERNAL MEDICINE

## 2023-10-06 PROCEDURE — 74019 RADEX ABDOMEN 2 VIEWS: CPT

## 2023-10-06 RX ORDER — DICYCLOMINE HYDROCHLORIDE 10 MG/1
10 CAPSULE ORAL 3 TIMES DAILY PRN
Qty: 20 CAPSULE | Refills: 0 | Status: SHIPPED | OUTPATIENT
Start: 2023-10-06

## 2023-10-06 RX ORDER — LOVASTATIN 10 MG/1
10 TABLET ORAL NIGHTLY
Qty: 30 TABLET | Refills: 3 | Status: SHIPPED | OUTPATIENT
Start: 2023-10-06

## 2023-10-22 PROBLEM — R14.0 ABDOMINAL BLOATING: Status: ACTIVE | Noted: 2023-10-22

## 2023-10-22 PROBLEM — R10.9 ABDOMINAL CRAMPING: Status: ACTIVE | Noted: 2023-10-22

## 2023-10-22 PROBLEM — R14.3 EXCESSIVE GAS: Status: ACTIVE | Noted: 2023-10-22

## 2023-10-22 ASSESSMENT — ENCOUNTER SYMPTOMS
TROUBLE SWALLOWING: 0
CHEST TIGHTNESS: 0
ABDOMINAL DISTENTION: 1
SHORTNESS OF BREATH: 0
SORE THROAT: 0
BLOOD IN STOOL: 0
COUGH: 0
WHEEZING: 0
NAUSEA: 0
DIARRHEA: 0
CONSTIPATION: 0
BACK PAIN: 0
ABDOMINAL PAIN: 1
VOMITING: 0
RHINORRHEA: 0

## 2024-02-23 SDOH — HEALTH STABILITY: PHYSICAL HEALTH: ON AVERAGE, HOW MANY MINUTES DO YOU ENGAGE IN EXERCISE AT THIS LEVEL?: 30 MIN

## 2024-02-23 SDOH — HEALTH STABILITY: PHYSICAL HEALTH: ON AVERAGE, HOW MANY DAYS PER WEEK DO YOU ENGAGE IN MODERATE TO STRENUOUS EXERCISE (LIKE A BRISK WALK)?: 2 DAYS

## 2024-02-23 ASSESSMENT — PATIENT HEALTH QUESTIONNAIRE - PHQ9
SUM OF ALL RESPONSES TO PHQ QUESTIONS 1-9: 0
2. FEELING DOWN, DEPRESSED OR HOPELESS: 0
SUM OF ALL RESPONSES TO PHQ QUESTIONS 1-9: 0
SUM OF ALL RESPONSES TO PHQ QUESTIONS 1-9: 0
SUM OF ALL RESPONSES TO PHQ9 QUESTIONS 1 & 2: 0
1. LITTLE INTEREST OR PLEASURE IN DOING THINGS: 0
SUM OF ALL RESPONSES TO PHQ QUESTIONS 1-9: 0

## 2024-02-23 ASSESSMENT — LIFESTYLE VARIABLES
HOW OFTEN DO YOU HAVE A DRINK CONTAINING ALCOHOL: MONTHLY OR LESS
HOW MANY STANDARD DRINKS CONTAINING ALCOHOL DO YOU HAVE ON A TYPICAL DAY: 1 OR 2
HOW OFTEN DO YOU HAVE SIX OR MORE DRINKS ON ONE OCCASION: 1
HOW MANY STANDARD DRINKS CONTAINING ALCOHOL DO YOU HAVE ON A TYPICAL DAY: 1
HOW OFTEN DO YOU HAVE A DRINK CONTAINING ALCOHOL: 2

## 2024-02-26 ENCOUNTER — OFFICE VISIT (OUTPATIENT)
Dept: PRIMARY CARE CLINIC | Age: 64
End: 2024-02-26
Payer: MEDICARE

## 2024-02-26 VITALS
WEIGHT: 181.6 LBS | HEIGHT: 62 IN | RESPIRATION RATE: 16 BRPM | OXYGEN SATURATION: 96 % | HEART RATE: 64 BPM | TEMPERATURE: 97 F | DIASTOLIC BLOOD PRESSURE: 84 MMHG | SYSTOLIC BLOOD PRESSURE: 126 MMHG | BODY MASS INDEX: 33.42 KG/M2

## 2024-02-26 DIAGNOSIS — I10 ESSENTIAL HYPERTENSION: ICD-10-CM

## 2024-02-26 DIAGNOSIS — I25.10 NONOCCLUSIVE CORONARY ATHEROSCLEROSIS OF NATIVE CORONARY ARTERY: ICD-10-CM

## 2024-02-26 DIAGNOSIS — R73.03 PREDIABETES: ICD-10-CM

## 2024-02-26 DIAGNOSIS — R10.9 ABDOMINAL PAIN, UNSPECIFIED ABDOMINAL LOCATION: ICD-10-CM

## 2024-02-26 DIAGNOSIS — R41.3 MEMORY PROBLEM: ICD-10-CM

## 2024-02-26 DIAGNOSIS — M19.049 HAND ARTHRITIS: ICD-10-CM

## 2024-02-26 DIAGNOSIS — Z00.00 WELCOME TO MEDICARE PREVENTIVE VISIT: Primary | ICD-10-CM

## 2024-02-26 DIAGNOSIS — E03.9 ACQUIRED HYPOTHYROIDISM: ICD-10-CM

## 2024-02-26 DIAGNOSIS — F51.01 PRIMARY INSOMNIA: ICD-10-CM

## 2024-02-26 PROCEDURE — 1123F ACP DISCUSS/DSCN MKR DOCD: CPT | Performed by: INTERNAL MEDICINE

## 2024-02-26 PROCEDURE — G0402 INITIAL PREVENTIVE EXAM: HCPCS | Performed by: INTERNAL MEDICINE

## 2024-02-26 PROCEDURE — 3074F SYST BP LT 130 MM HG: CPT | Performed by: INTERNAL MEDICINE

## 2024-02-26 PROCEDURE — 3079F DIAST BP 80-89 MM HG: CPT | Performed by: INTERNAL MEDICINE

## 2024-02-26 RX ORDER — AMLODIPINE BESYLATE 5 MG/1
TABLET ORAL
Qty: 90 TABLET | Refills: 1 | Status: SHIPPED | OUTPATIENT
Start: 2024-02-26

## 2024-02-26 RX ORDER — LOSARTAN POTASSIUM 50 MG/1
50 TABLET ORAL DAILY
Qty: 90 TABLET | Refills: 1 | Status: SHIPPED | OUTPATIENT
Start: 2024-02-26

## 2024-02-26 RX ORDER — METOPROLOL SUCCINATE 25 MG/1
TABLET, EXTENDED RELEASE ORAL
Qty: 45 TABLET | Refills: 1 | Status: SHIPPED | OUTPATIENT
Start: 2024-02-26

## 2024-02-26 RX ORDER — LOVASTATIN 10 MG/1
10 TABLET ORAL NIGHTLY
Qty: 90 TABLET | Refills: 1 | Status: SHIPPED | OUTPATIENT
Start: 2024-02-26

## 2024-02-26 RX ORDER — TRAZODONE HYDROCHLORIDE 100 MG/1
200 TABLET ORAL NIGHTLY
Qty: 180 TABLET | Refills: 1 | Status: SHIPPED | OUTPATIENT
Start: 2024-02-26

## 2024-02-26 RX ORDER — LEVOTHYROXINE SODIUM 88 UG/1
TABLET ORAL
Qty: 90 TABLET | Refills: 1 | Status: SHIPPED | OUTPATIENT
Start: 2024-02-26

## 2024-02-26 NOTE — PATIENT INSTRUCTIONS
cholesterol levels.     Limit the amount of solid fat-butter, margarine, and shortening-you eat. Use olive, peanut, or canola oil when you cook. Bake, broil, and steam foods instead of frying them.     Eat a variety of fruit and vegetables every day. Dark green, deep orange, red, or yellow fruits and vegetables are especially good for you. Examples include spinach, carrots, peaches, and berries.     Foods high in fiber can reduce your cholesterol and provide important vitamins and minerals. High-fiber foods include whole-grain cereals and breads, oatmeal, beans, brown rice, citrus fruits, and apples.     Eat lean proteins. Heart-healthy proteins include seafood, lean meats and poultry, eggs, beans, peas, nuts, seeds, and soy products.     Limit drinks and foods with added sugar. These include candy, desserts, and soda pop.   Lifestyle changes    If your doctor recommends it, get more exercise. Walking is a good choice. Bit by bit, increase the amount you walk every day. Try for at least 30 minutes on most days of the week. You also may want to swim, bike, or do other activities.     Do not smoke. If you need help quitting, talk to your doctor about stop-smoking programs and medicines. These can increase your chances of quitting for good. Quitting smoking may be the most important step you can take to protect your heart. It is never too late to quit.     Limit alcohol to 2 drinks a day for men and 1 drink a day for women. Too much alcohol can cause health problems.     Manage other health problems such as diabetes, high blood pressure, and high cholesterol. If you think you may have a problem with alcohol or drug use, talk to your doctor.   Medicines    Take your medicines exactly as prescribed. Call your doctor if you think you are having a problem with your medicine.     If your doctor recommends aspirin, take the amount directed each day. Make sure you take aspirin and not another kind of pain reliever, such as

## 2024-02-26 NOTE — PROGRESS NOTES
Activity, Diet, and Weight:  On average, how many days per week do you engage in moderate to strenuous exercise (like a brisk walk)?: 2 days  On average, how many minutes do you engage in exercise at this level?: 30 min    Do you eat balanced/healthy meals regularly?: (!) No    Body mass index is 33.22 kg/m². (!) Abnormal    Do you eat balanced/healthy meals regularly Interventions:  Patient declines any further evaluation or treatment  Obesity Interventions:  I recommend patient start a low carbohydrate diet, increase water, and do regular aerobic exercise to a goal of 150 minutes per week               Hearing Screen:  Do you or your family notice any trouble with your hearing that hasn't been managed with hearing aids?: (!) Yes    Interventions:  Patient declines any further evaluation or treatment       Advanced Directives:  Do you have a Living Will?: (!) No    Intervention:  has NO advanced directive - information provided                     Objective   Vitals:    02/26/24 1108   BP: 126/84   Pulse: 64   Resp: 16   Temp: 97 °F (36.1 °C)   SpO2: 96%   Weight: 82.4 kg (181 lb 9.6 oz)   Height: 1.575 m (5' 2\")      Body mass index is 33.22 kg/m².      General Appearance: alert and oriented to person, place and time, well-developed and well-nourished, in no acute distress  Head: normocephalic and atraumatic  Eyes: pupils equal, round, and reactive to light, extraocular eye movements intact, conjunctivae normal  ENT: tympanic membrane, external ear and ear canal normal bilaterally, oropharynx clear and moist with normal mucous membranes  Neck: neck supple and non tender without mass, no thyromegaly or thyroid nodules, no cervical lymphadenopathy   Pulmonary/Chest: clear to auscultation bilaterally- no wheezes, rales or rhonchi, normal air movement, no respiratory distress  Cardiovascular: normal rate, regular rhythm, normal S1 and S2, no murmurs, and no carotid bruits  Abdomen: soft, non-tender,

## 2024-02-27 LAB
ALBUMIN SERPL-MCNC: 4.9 G/DL (ref 3.4–5)
ALBUMIN/GLOB SERPL: 2.5 {RATIO} (ref 1.1–2.2)
ALP SERPL-CCNC: 110 U/L (ref 40–129)
ALT SERPL-CCNC: 15 U/L (ref 10–40)
ANION GAP SERPL CALCULATED.3IONS-SCNC: 12 MMOL/L (ref 3–16)
AST SERPL-CCNC: 15 U/L (ref 15–37)
BILIRUB SERPL-MCNC: 0.3 MG/DL (ref 0–1)
BUN SERPL-MCNC: 19 MG/DL (ref 7–20)
CALCIUM SERPL-MCNC: 10.1 MG/DL (ref 8.3–10.6)
CHLORIDE SERPL-SCNC: 102 MMOL/L (ref 99–110)
CHOLEST SERPL-MCNC: 175 MG/DL (ref 0–199)
CO2 SERPL-SCNC: 27 MMOL/L (ref 21–32)
CREAT SERPL-MCNC: 0.9 MG/DL (ref 0.6–1.2)
EST. AVERAGE GLUCOSE BLD GHB EST-MCNC: 116.9 MG/DL
GFR SERPLBLD CREATININE-BSD FMLA CKD-EPI: >60 ML/MIN/{1.73_M2}
GLUCOSE SERPL-MCNC: 126 MG/DL (ref 70–99)
HBA1C MFR BLD: 5.7 %
HDLC SERPL-MCNC: 65 MG/DL (ref 40–60)
LDLC SERPL CALC-MCNC: 92 MG/DL
POTASSIUM SERPL-SCNC: 4.7 MMOL/L (ref 3.5–5.1)
PROT SERPL-MCNC: 6.9 G/DL (ref 6.4–8.2)
SODIUM SERPL-SCNC: 141 MMOL/L (ref 136–145)
TRIGL SERPL-MCNC: 89 MG/DL (ref 0–150)
TSH SERPL DL<=0.005 MIU/L-ACNC: 3.32 UIU/ML (ref 0.27–4.2)
VLDLC SERPL CALC-MCNC: 18 MG/DL

## 2024-02-28 NOTE — TELEPHONE ENCOUNTER
Received call from Devonte You at [facility] with [employer]. Subjective: Caller states \"left shoulder and arm pain that goes around to back. Started Friday night and has been constant since then. If I move my arm a certain way it gets worse. I don't remember an injury. But I worked Thursday and lifted heavy boxes. It was ok Friday but then started hurting Friday night. When holding it up it stops hurting. No swelling, numbness, weakness. No fever, rash or neck pain. Also hurts in the left shoulder in the back. \"     Current Symptoms: left shoulder/arm pain, also back of shoulder    Onset: 4 days ago; unchanged    Associated Symptoms: reduced activity, increased wakefulness    Pain Severity: 8/10; aching; constant    Temperature: denies fever by unknown method    What has been tried: Alleve    LMP: NA Pregnant: NA    Recommended disposition: Go to Office Now    Care advice provided, patient verbalizes understanding; denies any other questions or concerns; instructed to call back for any new or worsening symptoms. Patient/Caller agrees with recommended disposition; writer provided warm transfer to [facility] for appointment scheduling     Attention Provider: Thank you for allowing me to participate in the care of your patient. The patient was connected to triage in response to information provided to the ECC/PSC. Please do not respond through this encounter as the response is not directed to a shared pool.             Reason for Disposition   SEVERE pain (e.g., excruciating, unable to do any normal activities)    Additional Information   Negative: Age > 40 and no obvious cause and pain even when not moving the arm (Exception: pain is clearly made worse by moving arm or bending neck)     Made worse by moving arm    Protocols used: SHOULDER PAIN-ADULT-OH Female

## 2024-03-03 PROBLEM — M19.049 HAND ARTHRITIS: Status: ACTIVE | Noted: 2024-03-03

## 2024-03-03 PROBLEM — R41.3 MEMORY PROBLEM: Status: ACTIVE | Noted: 2024-03-03

## 2024-03-03 PROBLEM — R10.9 ABDOMINAL PAIN: Status: ACTIVE | Noted: 2024-03-03

## 2024-03-06 DIAGNOSIS — I25.10 NONOCCLUSIVE CORONARY ATHEROSCLEROSIS OF NATIVE CORONARY ARTERY: ICD-10-CM

## 2024-03-06 RX ORDER — LOVASTATIN 10 MG/1
10 TABLET ORAL NIGHTLY
Qty: 30 TABLET | OUTPATIENT
Start: 2024-03-06

## 2024-03-07 ENCOUNTER — TELEPHONE (OUTPATIENT)
Dept: PRIMARY CARE CLINIC | Age: 64
End: 2024-03-07

## 2024-03-07 NOTE — TELEPHONE ENCOUNTER
Call patient. I received a letter from [facility] that they do not take outside referrals for Neuropsychology for memory problems. I recommend she call [facility] at [phone] to schedule for memory problems.

## 2024-03-19 DIAGNOSIS — I25.10 NONOCCLUSIVE CORONARY ATHEROSCLEROSIS OF NATIVE CORONARY ARTERY: ICD-10-CM

## 2024-03-19 RX ORDER — LOVASTATIN 10 MG/1
10 TABLET ORAL NIGHTLY
Qty: 30 TABLET | OUTPATIENT
Start: 2024-03-19

## 2024-04-18 NOTE — PROGRESS NOTES
Date of Visit: 10/6/2023    Parris Moses (:  1960) is a 70 y.o. female,  Established patient here for evaluation of the following chief complaint(s):  Abdominal Cramping and Swelling (In the stomach )      ASSESSMENT/PLAN:    1. Abdominal cramping  - Comprehensive Metabolic Panel; Future  - CBC with Auto Differential; Future  - POCT Urinalysis no Micro  - dicyclomine (BENTYL) 10 MG capsule; Take 1 capsule by mouth 3 times daily as needed (abdominal cramping)  Dispense: 20 capsule; Refill: 0    2. Abdominal bloating  - XR ABDOMEN (2 VIEWS); Future    3. Excessive gas  - XR ABDOMEN (2 VIEWS); Future  - Can try over the counter Gas-X or Beano    4. Nonocclusive coronary atherosclerosis of native coronary artery  - patient is off Pravastatin due to side effects  - Start lovastatin (MEVACOR) 10 MG tablet; Take 1 tablet by mouth nightly  Dispense: 30 tablet; Refill: 3  - patient has also had side effects with Rosuvastatin, Simvastatin, and Atorvastatin in the past  - continue aspirin 81mg once daily      Return in about 2 weeks (around 10/20/2023) for abdominal bloating, abdominal cramping, and gas. SUBJECTIVE:    Patient states she saw cardiology 2-3 weeks ago or longer and Pravastatin stopped for 2 weeks. Patient states after 2 weeks her stomach swelled up and felt tight for 2 weeks. Patient states she took an enema because she thought she needed to be cleaned out due to bloating. Patient states she wasn't constipated. Patient states the first day nothing happened. Patient states the 2nd and 3rd day she passed stool but not a lot. Patient states her stomach went down a little. Patient states every now and then it still feels bloated even is she doesn't eat. Patient states she has abdominal cramping off and on. Patient states it was last crampy a couple of days ago. Patient denies diarrhea, constipation, nausea, vomiting, heartburn, reflex, urinary symptoms, and fever.  Patient states she has gained No

## 2024-04-29 ENCOUNTER — TELEPHONE (OUTPATIENT)
Dept: PRIMARY CARE CLINIC | Age: 64
End: 2024-04-29

## 2024-04-29 SDOH — ECONOMIC STABILITY: FOOD INSECURITY: WITHIN THE PAST 12 MONTHS, YOU WORRIED THAT YOUR FOOD WOULD RUN OUT BEFORE YOU GOT MONEY TO BUY MORE.: NEVER TRUE

## 2024-04-29 SDOH — ECONOMIC STABILITY: FOOD INSECURITY: WITHIN THE PAST 12 MONTHS, THE FOOD YOU BOUGHT JUST DIDN'T LAST AND YOU DIDN'T HAVE MONEY TO GET MORE.: NEVER TRUE

## 2024-04-29 SDOH — ECONOMIC STABILITY: INCOME INSECURITY: HOW HARD IS IT FOR YOU TO PAY FOR THE VERY BASICS LIKE FOOD, HOUSING, MEDICAL CARE, AND HEATING?: NOT HARD AT ALL

## 2024-04-29 NOTE — TELEPHONE ENCOUNTER
----- Message from Valorie Roper sent at 4/29/2024  2:45 PM EDT -----  Regarding: ECC Escalation To Practice  ECC Escalation To Practice      Type of Escalation: Acute Care Symptom  --------------------------------------------------------------------------------------------------------------------------    Information for Provider:  Patient is looking for appointment for: Symptom body pain.  Reasons for Message: Other  no available schedule   walk in clinic is too far from patient's area.  Additional Information right side of the upper body is painful been malinda on for a week  now.  --------------------------------------------------------------------------------------------------------------------------    Relationship to Patient: Self     Call Back Info: OK to leave message on voicemail  Preferred Call Back Number: [phone]

## 2024-04-29 NOTE — PROGRESS NOTES
ENCOUNTER DATE: 4/30/2024     NAME: Jordyn Manzano   AGE: 64 y.o.   GENDER: female   YOB: 1960    Chief Complaint   Patient presents with    Flank Pain     Right side pain since 2 weeks        ASSESSMENT/PLAN:  1. Right upper quadrant abdominal pain  Discussed differentials including GB etiology, kidney stone versus musculoskeletal pain  Due to tenderness present in RUQ and right flank upon exam, will check labs and imaging  Check labs today  Check US.  Appointment offered for 7 AM tomorrow morning since patient is not NPO..  Patient declined.  States she will call and schedule ultrasound herself.  Schedule 5/3/2024  Advised if symptoms worsen, fever develops, nausea/vomiting develop should go to ED  - CBC; Future  - Comprehensive Metabolic Panel; Future  - US ABDOMEN LIMITED; Future    2. Flank pain  Unclear etiology.  Urine dip negative for hematuria  Small amount of leukocytes present on urine dip.  Will send urine for culture to rule out infection  Check US  - POCT Urinalysis no Micro  - Culture, Urine  - US ABDOMEN LIMITED; Future      No follow-ups on file.     HPI:   Patient is here for a problem visit    Complains of right-sided abdominal pain x 2 weeks.  Worsened on Friday.   Pain is constant in RUQ and right flank area.   Worsens with walking, standing or sitting and when sleeping. Can't sleep on that side.   Pain not affected with eating. No n/v/d. No bowel changes.   No belching or excess gas. Some bloating.   No fevers. No urinary symptoms. Also has back pain. Does walk a lot.   No known injury. Has been taking Ibuprofen  800mg qhs and this does seem to help.   Has been on semaglutide.     Has GI apt in 2 weeks. Referred for abd distention and bloating.     ROS:  Review of Systems   Constitutional:  Negative for chills, diaphoresis, fatigue and fever.   Respiratory:  Negative for cough, shortness of breath and wheezing.    Cardiovascular:  Negative for chest pain.   Gastrointestinal:

## 2024-04-30 ENCOUNTER — OFFICE VISIT (OUTPATIENT)
Dept: PRIMARY CARE CLINIC | Age: 64
End: 2024-04-30
Payer: MEDICARE

## 2024-04-30 VITALS
DIASTOLIC BLOOD PRESSURE: 80 MMHG | BODY MASS INDEX: 31.64 KG/M2 | OXYGEN SATURATION: 98 % | WEIGHT: 173 LBS | TEMPERATURE: 97.3 F | SYSTOLIC BLOOD PRESSURE: 130 MMHG | HEART RATE: 80 BPM

## 2024-04-30 DIAGNOSIS — R10.11 RIGHT UPPER QUADRANT ABDOMINAL PAIN: Primary | ICD-10-CM

## 2024-04-30 DIAGNOSIS — R10.9 FLANK PAIN: ICD-10-CM

## 2024-04-30 DIAGNOSIS — R10.11 RIGHT UPPER QUADRANT ABDOMINAL PAIN: ICD-10-CM

## 2024-04-30 PROBLEM — J06.9 UPPER RESPIRATORY TRACT INFECTION: Status: RESOLVED | Noted: 2022-11-04 | Resolved: 2024-04-30

## 2024-04-30 LAB
BILIRUBIN, POC: NORMAL
BLOOD URINE, POC: NORMAL
CLARITY, POC: NORMAL
COLOR, POC: YELLOW
DEPRECATED RDW RBC AUTO: 13.9 % (ref 12.4–15.4)
GLUCOSE URINE, POC: NORMAL
HCT VFR BLD AUTO: 42.9 % (ref 36–48)
HGB BLD-MCNC: 14.3 G/DL (ref 12–16)
KETONES, POC: NORMAL
LEUKOCYTE EST, POC: NORMAL
MCH RBC QN AUTO: 29.5 PG (ref 26–34)
MCHC RBC AUTO-ENTMCNC: 33.3 G/DL (ref 31–36)
MCV RBC AUTO: 88.7 FL (ref 80–100)
NITRITE, POC: NORMAL
PH, POC: 5
PLATELET # BLD AUTO: 224 K/UL (ref 135–450)
PMV BLD AUTO: 9.6 FL (ref 5–10.5)
PROTEIN, POC: NORMAL
RBC # BLD AUTO: 4.84 M/UL (ref 4–5.2)
SPECIFIC GRAVITY, POC: 1
UROBILINOGEN, POC: 0.2
WBC # BLD AUTO: 7.5 K/UL (ref 4–11)

## 2024-04-30 PROCEDURE — 3075F SYST BP GE 130 - 139MM HG: CPT | Performed by: NURSE PRACTITIONER

## 2024-04-30 PROCEDURE — 1123F ACP DISCUSS/DSCN MKR DOCD: CPT | Performed by: NURSE PRACTITIONER

## 2024-04-30 PROCEDURE — 3079F DIAST BP 80-89 MM HG: CPT | Performed by: NURSE PRACTITIONER

## 2024-04-30 PROCEDURE — 99214 OFFICE O/P EST MOD 30 MIN: CPT | Performed by: NURSE PRACTITIONER

## 2024-04-30 PROCEDURE — 81002 URINALYSIS NONAUTO W/O SCOPE: CPT | Performed by: NURSE PRACTITIONER

## 2024-04-30 RX ORDER — CYANOCOBALAMIN 1000 UG/ML
1000 INJECTION, SOLUTION INTRAMUSCULAR; SUBCUTANEOUS
COMMUNITY
Start: 2024-03-01

## 2024-04-30 ASSESSMENT — ENCOUNTER SYMPTOMS
ABDOMINAL DISTENTION: 1
COUGH: 0
ABDOMINAL PAIN: 1
SHORTNESS OF BREATH: 0
BACK PAIN: 1
DIARRHEA: 0
WHEEZING: 0
NAUSEA: 0
VOMITING: 0
CONSTIPATION: 0

## 2024-05-01 LAB
ALBUMIN SERPL-MCNC: 4.8 G/DL (ref 3.4–5)
ALBUMIN/GLOB SERPL: 2.5 {RATIO} (ref 1.1–2.2)
ALP SERPL-CCNC: 108 U/L (ref 40–129)
ALT SERPL-CCNC: 14 U/L (ref 10–40)
ANION GAP SERPL CALCULATED.3IONS-SCNC: 14 MMOL/L (ref 3–16)
AST SERPL-CCNC: 17 U/L (ref 15–37)
BACTERIA UR CULT: NORMAL
BILIRUB SERPL-MCNC: 0.4 MG/DL (ref 0–1)
BUN SERPL-MCNC: 19 MG/DL (ref 7–20)
CALCIUM SERPL-MCNC: 10.3 MG/DL (ref 8.3–10.6)
CHLORIDE SERPL-SCNC: 103 MMOL/L (ref 99–110)
CO2 SERPL-SCNC: 24 MMOL/L (ref 21–32)
CREAT SERPL-MCNC: 0.8 MG/DL (ref 0.6–1.2)
GFR SERPLBLD CREATININE-BSD FMLA CKD-EPI: 81 ML/MIN/{1.73_M2}
GLUCOSE SERPL-MCNC: 88 MG/DL (ref 70–99)
POTASSIUM SERPL-SCNC: 4.7 MMOL/L (ref 3.5–5.1)
PROT SERPL-MCNC: 6.7 G/DL (ref 6.4–8.2)
SODIUM SERPL-SCNC: 141 MMOL/L (ref 136–145)

## 2024-05-03 ENCOUNTER — HOSPITAL ENCOUNTER (OUTPATIENT)
Dept: ULTRASOUND IMAGING | Age: 64
Discharge: HOME | End: 2024-05-03
Payer: MEDICARE

## 2024-05-03 DIAGNOSIS — R10.9 FLANK PAIN: ICD-10-CM

## 2024-05-03 DIAGNOSIS — R10.11 RIGHT UPPER QUADRANT ABDOMINAL PAIN: ICD-10-CM

## 2024-05-03 PROCEDURE — 76705 ECHO EXAM OF ABDOMEN: CPT

## 2024-09-09 NOTE — PROGRESS NOTES
Patient refusing Lovenox shot and IV Pepcid this morning. Educated on importance of medications but patient still declined. Medical resident notified per secure message.  Will continue to monitor and reassess None

## 2024-09-29 DIAGNOSIS — I25.10 NONOCCLUSIVE CORONARY ATHEROSCLEROSIS OF NATIVE CORONARY ARTERY: ICD-10-CM

## 2024-09-30 RX ORDER — LOVASTATIN 10 MG
10 TABLET ORAL NIGHTLY
Qty: 30 TABLET | Refills: 0 | Status: SHIPPED | OUTPATIENT
Start: 2024-09-30

## 2024-09-30 NOTE — TELEPHONE ENCOUNTER
Medication:   Requested Prescriptions     Pending Prescriptions Disp Refills    lovastatin (MEVACOR) 10 MG tablet [Pharmacy Med Name: LOVASTATIN 10 MG TABLET] 60 tablet      Sig: TAKE ONE TABLET BY MOUTH ONCE NIGHTLY     Last Filled:  2.26.24    Last appt: 2.26.24  Next appt: 10/7/2024    Last OARRS:        No data to display

## 2024-10-01 DIAGNOSIS — E03.9 ACQUIRED HYPOTHYROIDISM: ICD-10-CM

## 2024-10-01 DIAGNOSIS — I25.10 NONOCCLUSIVE CORONARY ATHEROSCLEROSIS OF NATIVE CORONARY ARTERY: ICD-10-CM

## 2024-10-01 RX ORDER — LOVASTATIN 10 MG
10 TABLET ORAL NIGHTLY
Qty: 60 TABLET | OUTPATIENT
Start: 2024-10-01

## 2024-10-01 NOTE — TELEPHONE ENCOUNTER
Medication:   Requested Prescriptions     Pending Prescriptions Disp Refills    levothyroxine (SYNTHROID) 88 MCG tablet [Pharmacy Med Name: LEVOTHYROXINE 88 MCG TABLET] 56 tablet      Sig: TAKE 1 TABLET BY MOUTH DAILY    lovastatin (MEVACOR) 10 MG tablet [Pharmacy Med Name: LOVASTATIN 10 MG TABLET] 60 tablet      Sig: TAKE ONE TABLET BY MOUTH ONCE NIGHTLY     Last Filled:  02/26/2024, 09/30/2024    Last appt: 4/30/2024   Next appt: 10/7/2024    Last OARRS:        No data to display

## 2024-10-02 RX ORDER — LEVOTHYROXINE SODIUM 88 UG/1
TABLET ORAL
Qty: 90 TABLET | Refills: 0 | Status: SHIPPED | OUTPATIENT
Start: 2024-10-02

## 2024-10-06 SDOH — HEALTH STABILITY: PHYSICAL HEALTH: ON AVERAGE, HOW MANY MINUTES DO YOU ENGAGE IN EXERCISE AT THIS LEVEL?: 30 MIN

## 2024-10-06 SDOH — HEALTH STABILITY: PHYSICAL HEALTH: ON AVERAGE, HOW MANY DAYS PER WEEK DO YOU ENGAGE IN MODERATE TO STRENUOUS EXERCISE (LIKE A BRISK WALK)?: 3 DAYS

## 2024-10-06 ASSESSMENT — LIFESTYLE VARIABLES
HOW MANY STANDARD DRINKS CONTAINING ALCOHOL DO YOU HAVE ON A TYPICAL DAY: 1
HOW OFTEN DO YOU HAVE A DRINK CONTAINING ALCOHOL: 2
HOW OFTEN DO YOU HAVE SIX OR MORE DRINKS ON ONE OCCASION: 1
HOW MANY STANDARD DRINKS CONTAINING ALCOHOL DO YOU HAVE ON A TYPICAL DAY: 1 OR 2
HOW OFTEN DO YOU HAVE A DRINK CONTAINING ALCOHOL: MONTHLY OR LESS

## 2024-10-06 ASSESSMENT — PATIENT HEALTH QUESTIONNAIRE - PHQ9
SUM OF ALL RESPONSES TO PHQ QUESTIONS 1-9: 0
SUM OF ALL RESPONSES TO PHQ QUESTIONS 1-9: 0
SUM OF ALL RESPONSES TO PHQ9 QUESTIONS 1 & 2: 0
SUM OF ALL RESPONSES TO PHQ QUESTIONS 1-9: 0
2. FEELING DOWN, DEPRESSED OR HOPELESS: NOT AT ALL
1. LITTLE INTEREST OR PLEASURE IN DOING THINGS: NOT AT ALL
SUM OF ALL RESPONSES TO PHQ QUESTIONS 1-9: 0

## 2024-10-07 ENCOUNTER — OFFICE VISIT (OUTPATIENT)
Dept: PRIMARY CARE CLINIC | Age: 64
End: 2024-10-07
Payer: MEDICARE

## 2024-10-07 VITALS
OXYGEN SATURATION: 97 % | DIASTOLIC BLOOD PRESSURE: 84 MMHG | BODY MASS INDEX: 31.28 KG/M2 | SYSTOLIC BLOOD PRESSURE: 132 MMHG | HEART RATE: 70 BPM | WEIGHT: 171 LBS

## 2024-10-07 DIAGNOSIS — E03.9 ACQUIRED HYPOTHYROIDISM: ICD-10-CM

## 2024-10-07 DIAGNOSIS — R73.03 PREDIABETES: ICD-10-CM

## 2024-10-07 DIAGNOSIS — M19.041 ARTHRITIS OF RIGHT HAND: ICD-10-CM

## 2024-10-07 DIAGNOSIS — F51.01 PRIMARY INSOMNIA: ICD-10-CM

## 2024-10-07 DIAGNOSIS — L60.3 BRITTLE NAILS: ICD-10-CM

## 2024-10-07 DIAGNOSIS — I25.10 NONOCCLUSIVE CORONARY ATHEROSCLEROSIS OF NATIVE CORONARY ARTERY: ICD-10-CM

## 2024-10-07 DIAGNOSIS — R42 LIGHTHEADED: ICD-10-CM

## 2024-10-07 DIAGNOSIS — R51.9 NONINTRACTABLE HEADACHE, UNSPECIFIED CHRONICITY PATTERN, UNSPECIFIED HEADACHE TYPE: ICD-10-CM

## 2024-10-07 DIAGNOSIS — I10 ESSENTIAL HYPERTENSION: Primary | ICD-10-CM

## 2024-10-07 PROCEDURE — 1123F ACP DISCUSS/DSCN MKR DOCD: CPT | Performed by: INTERNAL MEDICINE

## 2024-10-07 PROCEDURE — 99214 OFFICE O/P EST MOD 30 MIN: CPT | Performed by: INTERNAL MEDICINE

## 2024-10-07 PROCEDURE — 3075F SYST BP GE 130 - 139MM HG: CPT | Performed by: INTERNAL MEDICINE

## 2024-10-07 PROCEDURE — 3079F DIAST BP 80-89 MM HG: CPT | Performed by: INTERNAL MEDICINE

## 2024-10-07 NOTE — PATIENT INSTRUCTIONS
-Fast 8-10 hours for labs  -Take Losartan in the morning with food  -Take amlodipine in the evening    -Voltaren gel to affected hand as needed  -Tylenol arthritis as needed    -Low sodium diet  -Low fat, low cholesterol diet  -Low fat, low cholesterol diet  -Regular aerobic exercise

## 2024-10-07 NOTE — PROGRESS NOTES
Date of Visit: 10/7/2024    Jordyn Manzano (:  1960) is a 64 y.o. female,  Established patient here for evaluation of the following chief complaint(s):  Hypertension, Insomnia, Hypothyroidism, Coronary Artery Disease, Prediabetes, Brittle nails, Discuss Medications (Patient states she take levothyroxine first thing in the AM and waits one hour before taking other morning medication. Once she takes the blood pressure pills and ASA states head gets fuzzy and she gets a headache. ), and Arthritis (Digits 3 and 4 on right hand are stiffing. Sometimes pain radiates up arm to the shoulder. )      ASSESSMENT/PLAN:    1. Essential hypertension  Assessment & Plan:  -stable  -Continue amlodipine 5mg once daily and change to every evening  -Continue Losartan 50mg once daily in the morning  -Low sodium diet  -Regular aerobic exercise  Orders:  -     Comprehensive Metabolic Panel; Future  2. Acquired hypothyroidism  Assessment & Plan:  -stable  -Continue Levothyroxine 88mcg once daily   Orders:  -     TSH; Future  3. Prediabetes  Assessment & Plan:  -stable  -Low carbohydrate diet  -Regular aerobic exercise   Orders:  -     Hemoglobin A1C; Future  4. Primary insomnia  Assessment & Plan:  -stable  -Continue Trazodone 200mg nightly   5. Nonocclusive coronary atherosclerosis of native coronary artery  Assessment & Plan:  -stable  -Continue Lovastatin 10mg nightly  -Continue aspirin 81mg once daily  -Continue care per Cardiology   Orders:  -     Lipid Panel; Future  6. Nonintractable headache, unspecified chronicity pattern, unspecified headache type  Assessment & Plan:  -patient declines imaging  -patient thinks headache occurs with taking her BP medications and aspirin together in the morning  -patient to change amlodipine to 5mg once daily in the evening  -Tylenol as needed  7. Lightheaded  Assessment & Plan:  -patient thinks lightheadedness occurs with taking blood pressure medications and aspirin together in the

## 2024-10-11 DIAGNOSIS — R42 LIGHTHEADED: ICD-10-CM

## 2024-10-11 DIAGNOSIS — I25.10 NONOCCLUSIVE CORONARY ATHEROSCLEROSIS OF NATIVE CORONARY ARTERY: ICD-10-CM

## 2024-10-11 DIAGNOSIS — I10 ESSENTIAL HYPERTENSION: ICD-10-CM

## 2024-10-11 DIAGNOSIS — R73.03 PREDIABETES: ICD-10-CM

## 2024-10-11 DIAGNOSIS — E03.9 ACQUIRED HYPOTHYROIDISM: ICD-10-CM

## 2024-10-11 LAB
ALBUMIN SERPL-MCNC: 4.4 G/DL (ref 3.4–5)
ALBUMIN/GLOB SERPL: 2.6 {RATIO} (ref 1.1–2.2)
ALP SERPL-CCNC: 114 U/L (ref 40–129)
ALT SERPL-CCNC: 20 U/L (ref 10–40)
ANION GAP SERPL CALCULATED.3IONS-SCNC: 9 MMOL/L (ref 3–16)
AST SERPL-CCNC: 20 U/L (ref 15–37)
BILIRUB SERPL-MCNC: 0.5 MG/DL (ref 0–1)
BUN SERPL-MCNC: 15 MG/DL (ref 7–20)
CALCIUM SERPL-MCNC: 9.9 MG/DL (ref 8.3–10.6)
CHLORIDE SERPL-SCNC: 105 MMOL/L (ref 99–110)
CHOLEST SERPL-MCNC: 153 MG/DL (ref 0–199)
CO2 SERPL-SCNC: 28 MMOL/L (ref 21–32)
CREAT SERPL-MCNC: 0.8 MG/DL (ref 0.6–1.2)
DEPRECATED RDW RBC AUTO: 14.2 % (ref 12.4–15.4)
GFR SERPLBLD CREATININE-BSD FMLA CKD-EPI: 81 ML/MIN/{1.73_M2}
GLUCOSE SERPL-MCNC: 114 MG/DL (ref 70–99)
HCT VFR BLD AUTO: 44 % (ref 36–48)
HDLC SERPL-MCNC: 53 MG/DL (ref 40–60)
HGB BLD-MCNC: 14.6 G/DL (ref 12–16)
LDLC SERPL CALC-MCNC: 83 MG/DL
MCH RBC QN AUTO: 30.1 PG (ref 26–34)
MCHC RBC AUTO-ENTMCNC: 33.2 G/DL (ref 31–36)
MCV RBC AUTO: 90.7 FL (ref 80–100)
PLATELET # BLD AUTO: 224 K/UL (ref 135–450)
PMV BLD AUTO: 9.2 FL (ref 5–10.5)
POTASSIUM SERPL-SCNC: 4.2 MMOL/L (ref 3.5–5.1)
PROT SERPL-MCNC: 6.1 G/DL (ref 6.4–8.2)
RBC # BLD AUTO: 4.85 M/UL (ref 4–5.2)
SODIUM SERPL-SCNC: 142 MMOL/L (ref 136–145)
TRIGL SERPL-MCNC: 87 MG/DL (ref 0–150)
TSH SERPL DL<=0.005 MIU/L-ACNC: 1.02 UIU/ML (ref 0.27–4.2)
VLDLC SERPL CALC-MCNC: 17 MG/DL
WBC # BLD AUTO: 5 K/UL (ref 4–11)

## 2024-10-12 LAB
EST. AVERAGE GLUCOSE BLD GHB EST-MCNC: 116.9 MG/DL
HBA1C MFR BLD: 5.7 %

## 2024-10-15 PROBLEM — R51.9 NONINTRACTABLE HEADACHE: Status: ACTIVE | Noted: 2024-10-15

## 2024-10-15 PROBLEM — M19.041 ARTHRITIS OF RIGHT HAND: Status: ACTIVE | Noted: 2024-10-15

## 2024-10-15 PROBLEM — L60.3 BRITTLE NAILS: Status: ACTIVE | Noted: 2024-10-15

## 2024-10-15 PROBLEM — R42 LIGHTHEADED: Status: ACTIVE | Noted: 2024-10-15

## 2024-10-15 ASSESSMENT — ENCOUNTER SYMPTOMS
SORE THROAT: 0
RHINORRHEA: 0
SHORTNESS OF BREATH: 0
CHEST TIGHTNESS: 0
SINUS PAIN: 0
WHEEZING: 0
DIARRHEA: 0
SINUS PRESSURE: 0
CONSTIPATION: 0
TROUBLE SWALLOWING: 0
BLOOD IN STOOL: 0
ABDOMINAL PAIN: 0
VOMITING: 0
NAUSEA: 0
COUGH: 0

## 2024-10-15 NOTE — ASSESSMENT & PLAN NOTE
-patient thinks lightheadedness occurs with taking blood pressure medications and aspirin together in the morning  -patient declines imaging  -patient to change amlodipine to once daily in the evening   -Comprehensive metabolic panel  -CBC  -stay hydrated

## 2024-10-15 NOTE — ASSESSMENT & PLAN NOTE
-stable  -Continue Lovastatin 10mg nightly  -Continue aspirin 81mg once daily  -Continue care per Cardiology

## 2024-10-15 NOTE — ASSESSMENT & PLAN NOTE
-patient declines imaging  -patient thinks headache occurs with taking her BP medications and aspirin together in the morning  -patient to change amlodipine to 5mg once daily in the evening  -Tylenol as needed

## 2024-10-15 NOTE — ASSESSMENT & PLAN NOTE
-not controlled  -Voltaren gel to affect area 4 times daily as needed  -Tylenol Arthritis 3 times daily as needed   -Referral to Rheumatology

## 2024-10-15 NOTE — ASSESSMENT & PLAN NOTE
-stable  -Continue amlodipine 5mg once daily and change to every evening  -Continue Losartan 50mg once daily in the morning  -Low sodium diet  -Regular aerobic exercise

## 2024-10-28 ENCOUNTER — OFFICE VISIT (OUTPATIENT)
Dept: PRIMARY CARE CLINIC | Age: 64
End: 2024-10-28
Payer: MEDICARE

## 2024-10-28 VITALS
OXYGEN SATURATION: 97 % | BODY MASS INDEX: 31.47 KG/M2 | SYSTOLIC BLOOD PRESSURE: 112 MMHG | HEART RATE: 69 BPM | RESPIRATION RATE: 16 BRPM | TEMPERATURE: 96.9 F | DIASTOLIC BLOOD PRESSURE: 80 MMHG | WEIGHT: 171 LBS | HEIGHT: 62 IN

## 2024-10-28 DIAGNOSIS — R42 LIGHTHEADED: Primary | ICD-10-CM

## 2024-10-28 DIAGNOSIS — R41.3 MEMORY PROBLEM: ICD-10-CM

## 2024-10-28 PROCEDURE — 1123F ACP DISCUSS/DSCN MKR DOCD: CPT | Performed by: INTERNAL MEDICINE

## 2024-10-28 PROCEDURE — 99213 OFFICE O/P EST LOW 20 MIN: CPT | Performed by: INTERNAL MEDICINE

## 2024-10-28 PROCEDURE — 3079F DIAST BP 80-89 MM HG: CPT | Performed by: INTERNAL MEDICINE

## 2024-10-28 PROCEDURE — 3074F SYST BP LT 130 MM HG: CPT | Performed by: INTERNAL MEDICINE

## 2024-10-28 SDOH — ECONOMIC STABILITY: FOOD INSECURITY: WITHIN THE PAST 12 MONTHS, THE FOOD YOU BOUGHT JUST DIDN'T LAST AND YOU DIDN'T HAVE MONEY TO GET MORE.: NEVER TRUE

## 2024-10-28 SDOH — ECONOMIC STABILITY: FOOD INSECURITY: WITHIN THE PAST 12 MONTHS, YOU WORRIED THAT YOUR FOOD WOULD RUN OUT BEFORE YOU GOT MONEY TO BUY MORE.: NEVER TRUE

## 2024-10-28 SDOH — ECONOMIC STABILITY: INCOME INSECURITY: HOW HARD IS IT FOR YOU TO PAY FOR THE VERY BASICS LIKE FOOD, HOUSING, MEDICAL CARE, AND HEATING?: NOT HARD AT ALL

## 2024-10-28 ASSESSMENT — PATIENT HEALTH QUESTIONNAIRE - PHQ9
SUM OF ALL RESPONSES TO PHQ QUESTIONS 1-9: 0
2. FEELING DOWN, DEPRESSED OR HOPELESS: NOT AT ALL
SUM OF ALL RESPONSES TO PHQ QUESTIONS 1-9: 0
SUM OF ALL RESPONSES TO PHQ9 QUESTIONS 1 & 2: 0
1. LITTLE INTEREST OR PLEASURE IN DOING THINGS: NOT AT ALL
SUM OF ALL RESPONSES TO PHQ QUESTIONS 1-9: 0
SUM OF ALL RESPONSES TO PHQ QUESTIONS 1-9: 0

## 2024-10-28 NOTE — PROGRESS NOTES
Date of Visit: 10/28/2024    Jordyn Manzano (:  1960) is a 64 y.o. female,  Established patient here for evaluation of the following chief complaint(s):  Light headedness      ASSESSMENT/PLAN:    1. Lightheaded  Assessment & Plan:  -Resolved with change in the way patient takes her blood pressure medication   -Labs unremarkable  -Stay hydrated   2. Memory problem  Assessment & Plan:  -Discussed  -Patient to schedule with [facility] for neuropsych testing      Return in about 4 months (around 2025) for Medicare AW.    SUBJECTIVE:    Patient states her lightheadedness resolved with changing where she takes her blood pressure medication. Patient had labs done which were unremarkable.    Patient states she has memory problems. Patient states she couldn't remember her neighbors name and it took her an hour to remember it and it upset her because she coulnd't remember. Patient states her sister called and told her something and she could not remember what she told her and had to call her back within an hour. Patient states she has difficulty remembering short-term things. Patient states she puts appointments in her phone. Patient states she writes down what she learns at work so she remembers. Patient was previously referred to Neuropsychology for evaluation but hasn't scheduled.      Review of Systems   Eyes:  Negative for visual disturbance.   Respiratory:  Negative for chest tightness and shortness of breath.    Cardiovascular:  Negative for chest pain, palpitations and leg swelling.   Genitourinary:  Negative for frequency and hematuria.   Neurological:  Negative for dizziness, syncope, light-headedness and headaches.       No Known Allergies    Prior to Visit Medications    Medication Sig Taking? Authorizing Provider   levothyroxine (SYNTHROID) 88 MCG tablet TAKE 1 TABLET BY MOUTH DAILY Yes No Miller MD   lovastatin (MEVACOR) 10 MG tablet TAKE ONE TABLET BY MOUTH ONCE

## 2024-11-06 ASSESSMENT — ENCOUNTER SYMPTOMS
CHEST TIGHTNESS: 0
SHORTNESS OF BREATH: 0

## 2024-11-06 NOTE — ASSESSMENT & PLAN NOTE
-Discussed  -Patient to schedule with [facility] for neuropsych testing

## 2024-11-06 NOTE — ASSESSMENT & PLAN NOTE
-Resolved with change in the way patient takes her blood pressure medication   -Labs unremarkable  -Stay hydrated

## 2024-11-10 DIAGNOSIS — I10 ESSENTIAL HYPERTENSION: ICD-10-CM

## 2024-11-10 DIAGNOSIS — I25.10 NONOCCLUSIVE CORONARY ATHEROSCLEROSIS OF NATIVE CORONARY ARTERY: ICD-10-CM

## 2024-11-11 NOTE — TELEPHONE ENCOUNTER
Medication:   Requested Prescriptions     Pending Prescriptions Disp Refills    lovastatin (MEVACOR) 10 MG tablet [Pharmacy Med Name: LOVASTATIN 10 MG TABLET] 30 tablet 0     Sig: TAKE ONE TABLET BY MOUTH ONCE NIGHTLY    amLODIPine (NORVASC) 5 MG tablet [Pharmacy Med Name: AMLODIPINE BESYLATE 5 MG TAB] 90 tablet 1     Sig: TAKE 1 TABLET BY MOUTH DAILY     Last Filled:  9/30/24 2/26/24    Last appt: 10/28/2024   Next appt: 2/28/2025    Last Lipid:   Lab Results   Component Value Date/Time    CHOL 153 10/11/2024 09:00 AM    TRIG 87 10/11/2024 09:00 AM    HDL 53 10/11/2024 09:00 AM

## 2024-11-12 RX ORDER — AMLODIPINE BESYLATE 5 MG/1
TABLET ORAL
Qty: 90 TABLET | Refills: 1 | Status: SHIPPED | OUTPATIENT
Start: 2024-11-12

## 2024-11-12 RX ORDER — LOVASTATIN 10 MG/1
10 TABLET ORAL NIGHTLY
Qty: 90 TABLET | Refills: 1 | Status: SHIPPED | OUTPATIENT
Start: 2024-11-12

## 2024-11-14 DIAGNOSIS — I10 ESSENTIAL HYPERTENSION: ICD-10-CM

## 2024-11-14 RX ORDER — LOSARTAN POTASSIUM 50 MG/1
50 TABLET ORAL DAILY
Qty: 90 TABLET | Refills: 1 | Status: SHIPPED | OUTPATIENT
Start: 2024-11-14

## 2024-11-14 NOTE — TELEPHONE ENCOUNTER
Medication:   Requested Prescriptions     Pending Prescriptions Disp Refills    losartan (COZAAR) 50 MG tablet [Pharmacy Med Name: LOSARTAN POTASSIUM 50 MG TAB] 90 tablet 1     Sig: TAKE 1 TABLET BY MOUTH DAILY     Last Filled:  2/26/2024    Last appt: 10/28/2024   Next appt: 2/28/2025    Last OARRS:        No data to display

## 2024-11-28 DIAGNOSIS — F51.01 PRIMARY INSOMNIA: ICD-10-CM

## 2024-12-02 RX ORDER — TRAZODONE HYDROCHLORIDE 100 MG/1
TABLET ORAL
Qty: 180 TABLET | Refills: 0 | Status: SHIPPED | OUTPATIENT
Start: 2024-12-02

## 2024-12-02 NOTE — TELEPHONE ENCOUNTER
Medication:   Requested Prescriptions     Pending Prescriptions Disp Refills    traZODone (DESYREL) 100 MG tablet [Pharmacy Med Name: traZODone 100 MG TABLET] 180 tablet 1     Sig: TAKE TWO TABLETS BY MOUTH NIGHTLY     Last filled: 2/26/24  Last appt: 10/28/2024   Next appt: 2/28/2025    Last OARRS:        No data to display

## 2025-01-09 DIAGNOSIS — E03.9 ACQUIRED HYPOTHYROIDISM: ICD-10-CM

## 2025-01-09 RX ORDER — LEVOTHYROXINE SODIUM 88 UG/1
TABLET ORAL
Qty: 90 TABLET | Refills: 0 | Status: SHIPPED | OUTPATIENT
Start: 2025-01-09

## 2025-01-09 NOTE — TELEPHONE ENCOUNTER
Medication:   Requested Prescriptions     Pending Prescriptions Disp Refills    levothyroxine (SYNTHROID) 88 MCG tablet [Pharmacy Med Name: LEVOTHYROXINE 88 MCG TABLET] 90 tablet 0     Sig: TAKE 1 TABLET BY MOUTH DAILY     Last Filled:  10.2.24    Last appt: 10/28/2024   Next appt: 2/28/2025    Last Thyroid:   Lab Results   Component Value Date/Time    TSH 1.02 10/11/2024 09:00 AM    TSH 2.38 07/27/2020 05:25 AM    U4GYHIG <0.10 08/17/2010 09:00 PM    B9YOILO <0.10 08/17/2010 09:00 PM    T4FREE 1.5 05/18/2020 10:27 AM

## 2025-01-28 ENCOUNTER — HOSPITAL ENCOUNTER (OUTPATIENT)
Dept: GENERAL RADIOLOGY | Age: 65
Discharge: HOME | End: 2025-01-28
Payer: MEDICARE

## 2025-01-28 DIAGNOSIS — M25.559 HIP PAIN, UNSPECIFIED LATERALITY: ICD-10-CM

## 2025-01-28 PROCEDURE — 73521 X-RAY EXAM HIPS BI 2 VIEWS: CPT

## 2025-02-27 SDOH — ECONOMIC STABILITY: INCOME INSECURITY: IN THE LAST 12 MONTHS, WAS THERE A TIME WHEN YOU WERE NOT ABLE TO PAY THE MORTGAGE OR RENT ON TIME?: NO

## 2025-02-27 SDOH — HEALTH STABILITY: PHYSICAL HEALTH: ON AVERAGE, HOW MANY MINUTES DO YOU ENGAGE IN EXERCISE AT THIS LEVEL?: 30 MIN

## 2025-02-27 SDOH — HEALTH STABILITY: PHYSICAL HEALTH: ON AVERAGE, HOW MANY DAYS PER WEEK DO YOU ENGAGE IN MODERATE TO STRENUOUS EXERCISE (LIKE A BRISK WALK)?: 2 DAYS

## 2025-02-27 SDOH — ECONOMIC STABILITY: FOOD INSECURITY: WITHIN THE PAST 12 MONTHS, THE FOOD YOU BOUGHT JUST DIDN'T LAST AND YOU DIDN'T HAVE MONEY TO GET MORE.: NEVER TRUE

## 2025-02-27 SDOH — ECONOMIC STABILITY: FOOD INSECURITY: WITHIN THE PAST 12 MONTHS, YOU WORRIED THAT YOUR FOOD WOULD RUN OUT BEFORE YOU GOT MONEY TO BUY MORE.: NEVER TRUE

## 2025-02-27 ASSESSMENT — LIFESTYLE VARIABLES
HOW OFTEN DO YOU HAVE A DRINK CONTAINING ALCOHOL: 2
HOW OFTEN DO YOU HAVE A DRINK CONTAINING ALCOHOL: MONTHLY OR LESS
HOW OFTEN DO YOU HAVE SIX OR MORE DRINKS ON ONE OCCASION: 1
HOW MANY STANDARD DRINKS CONTAINING ALCOHOL DO YOU HAVE ON A TYPICAL DAY: 1
HOW MANY STANDARD DRINKS CONTAINING ALCOHOL DO YOU HAVE ON A TYPICAL DAY: 1 OR 2

## 2025-02-27 ASSESSMENT — PATIENT HEALTH QUESTIONNAIRE - PHQ9
2. FEELING DOWN, DEPRESSED OR HOPELESS: NOT AT ALL
SUM OF ALL RESPONSES TO PHQ QUESTIONS 1-9: 0
1. LITTLE INTEREST OR PLEASURE IN DOING THINGS: NOT AT ALL

## 2025-02-28 ENCOUNTER — OFFICE VISIT (OUTPATIENT)
Dept: PRIMARY CARE CLINIC | Age: 65
End: 2025-02-28
Payer: MEDICARE

## 2025-02-28 VITALS
SYSTOLIC BLOOD PRESSURE: 120 MMHG | DIASTOLIC BLOOD PRESSURE: 86 MMHG | OXYGEN SATURATION: 97 % | WEIGHT: 176 LBS | BODY MASS INDEX: 32.39 KG/M2 | HEART RATE: 78 BPM | TEMPERATURE: 97.1 F | HEIGHT: 62 IN | RESPIRATION RATE: 16 BRPM

## 2025-02-28 DIAGNOSIS — R73.03 PREDIABETES: ICD-10-CM

## 2025-02-28 DIAGNOSIS — I10 ESSENTIAL HYPERTENSION: ICD-10-CM

## 2025-02-28 DIAGNOSIS — Z78.0 POSTMENOPAUSE: ICD-10-CM

## 2025-02-28 DIAGNOSIS — Z00.00 INITIAL MEDICARE ANNUAL WELLNESS VISIT: Primary | ICD-10-CM

## 2025-02-28 DIAGNOSIS — M79.604 RIGHT LEG PAIN: ICD-10-CM

## 2025-02-28 DIAGNOSIS — I25.10 NONOCCLUSIVE CORONARY ATHEROSCLEROSIS OF NATIVE CORONARY ARTERY: ICD-10-CM

## 2025-02-28 DIAGNOSIS — E03.9 ACQUIRED HYPOTHYROIDISM: Primary | ICD-10-CM

## 2025-02-28 DIAGNOSIS — M48.061 SPINAL STENOSIS OF LUMBAR REGION, UNSPECIFIED WHETHER NEUROGENIC CLAUDICATION PRESENT: ICD-10-CM

## 2025-02-28 PROCEDURE — 1123F ACP DISCUSS/DSCN MKR DOCD: CPT | Performed by: INTERNAL MEDICINE

## 2025-02-28 PROCEDURE — G0438 PPPS, INITIAL VISIT: HCPCS | Performed by: INTERNAL MEDICINE

## 2025-02-28 PROCEDURE — 3079F DIAST BP 80-89 MM HG: CPT | Performed by: INTERNAL MEDICINE

## 2025-02-28 PROCEDURE — 3074F SYST BP LT 130 MM HG: CPT | Performed by: INTERNAL MEDICINE

## 2025-02-28 NOTE — PROGRESS NOTES
No YARBROUGH MD   lovastatin (MEVACOR) 10 MG tablet TAKE ONE TABLET BY MOUTH ONCE NIGHTLY Yes No Miller MD   amLODIPine (NORVASC) 5 MG tablet TAKE 1 TABLET BY MOUTH DAILY Yes No Miller MD   aspirin 81 MG EC tablet Take 1 tablet by mouth daily Yes Al Jin DO   progesterone (PROMETRIUM) 200 MG capsule Take 1 capsule by mouth nightly Yes Provider, MD Tyler   losartan (COZAAR) 50 MG tablet TAKE 1 TABLET BY MOUTH DAILY  Patient not taking: Reported on 2/28/2025  No Miller MD       [facility] (Including outside providers/suppliers regularly involved in providing care):   Patient Care Team:  No Miller MD as PCP - General (Internal Medicine)  No Miller MD as PCP - Empaneled Provider     Recommendations for Preventive Services Due: see orders and patient instructions/AVS.  Recommended screening schedule for the next 5-10 years is provided to the patient in written form: see Patient Instructions/AVS.     Reviewed and updated this visit:  Tobacco  Allergies  Meds  Med Hx  Surg Hx  Fam Hx  Sexual Hx

## 2025-02-28 NOTE — PATIENT INSTRUCTIONS
having meals with you, even if they may be eating something different.  Find what works best for you. If you do not have time or do not like to cook, a program that offers meal replacement bars or shakes may be better for you. Or if you like to prepare meals, finding a plan that includes daily menus and recipes may be best.  Ask your doctor about other health professionals who can help you achieve your weight-loss goals.  A dietitian can help you make healthy changes in your diet.  An exercise specialist or  can help you develop a safe and effective exercise program.  A counselor or psychiatrist can help you cope with issues such as depression, anxiety, or family problems that can make it hard to focus on weight loss.  Consider joining a support group for people who are trying to lose weight. Your doctor can suggest groups in your area.  Where can you learn more?  Go to https://www.[employer]/patientEd and enter U357 to learn more about \"Starting a Weight-Loss Plan: Care Instructions.\"  Current as of: April 30, 2024  Content Version: 14.3  © 2024 [employer].   Care instructions adapted under license by [employer]. If you have questions about a medical condition or this instruction, always ask your healthcare professional. [employer], disclaims any warranty or liability for your use of this information.         Advance Directives: Care Instructions  Overview  An advance directive is a legal way to state your wishes at the end of your life. It tells your family and your doctor what to do if you can't say what you want.  There are two main types of advance directives. You can change them any time your wishes change.  Living will.  This form tells your family and your doctor your wishes about life support and other treatment. The form is also called a declaration.  Medical power of .  This form lets you name a person to make treatment decisions for you when you can't

## 2025-03-08 DIAGNOSIS — F51.01 PRIMARY INSOMNIA: ICD-10-CM

## 2025-03-08 PROBLEM — Z00.00 INITIAL MEDICARE ANNUAL WELLNESS VISIT: Status: ACTIVE | Noted: 2025-03-08

## 2025-03-08 PROBLEM — Z78.0 POSTMENOPAUSE: Status: ACTIVE | Noted: 2025-03-08

## 2025-03-08 PROBLEM — M48.061 SPINAL STENOSIS OF LUMBAR REGION: Status: ACTIVE | Noted: 2025-03-08

## 2025-03-10 RX ORDER — TRAZODONE HYDROCHLORIDE 100 MG/1
TABLET ORAL
Qty: 180 TABLET | Refills: 1 | Status: SHIPPED | OUTPATIENT
Start: 2025-03-10

## 2025-03-10 NOTE — TELEPHONE ENCOUNTER
Medication:   Requested Prescriptions     Pending Prescriptions Disp Refills    traZODone (DESYREL) 100 MG tablet [Pharmacy Med Name: traZODone 100 MG TABLET] 180 tablet 0     Sig: TAKE TWO TABLETS BY MOUTH EVERY NIGHT AT BEDTIME     Last Filled:  12/07/2024    Last appt: 2/28/2025   Next appt: 4/28/2025    Last OARRS:        No data to display

## 2025-03-19 ENCOUNTER — OFFICE VISIT (OUTPATIENT)
Dept: PRIMARY CARE CLINIC | Age: 65
End: 2025-03-19
Payer: MEDICARE

## 2025-03-19 VITALS
WEIGHT: 177 LBS | DIASTOLIC BLOOD PRESSURE: 92 MMHG | BODY MASS INDEX: 32.37 KG/M2 | HEART RATE: 73 BPM | OXYGEN SATURATION: 97 % | TEMPERATURE: 97.6 F | SYSTOLIC BLOOD PRESSURE: 148 MMHG | RESPIRATION RATE: 16 BRPM

## 2025-03-19 DIAGNOSIS — I25.10 NONOCCLUSIVE CORONARY ATHEROSCLEROSIS OF NATIVE CORONARY ARTERY: ICD-10-CM

## 2025-03-19 DIAGNOSIS — H93.13 TINNITUS OF BOTH EARS: Primary | ICD-10-CM

## 2025-03-19 DIAGNOSIS — I10 ESSENTIAL HYPERTENSION: ICD-10-CM

## 2025-03-19 PROCEDURE — 1160F RVW MEDS BY RX/DR IN RCRD: CPT | Performed by: NURSE PRACTITIONER

## 2025-03-19 PROCEDURE — 1159F MED LIST DOCD IN RCRD: CPT | Performed by: NURSE PRACTITIONER

## 2025-03-19 PROCEDURE — 3080F DIAST BP >= 90 MM HG: CPT | Performed by: NURSE PRACTITIONER

## 2025-03-19 PROCEDURE — 1123F ACP DISCUSS/DSCN MKR DOCD: CPT | Performed by: NURSE PRACTITIONER

## 2025-03-19 PROCEDURE — 3077F SYST BP >= 140 MM HG: CPT | Performed by: NURSE PRACTITIONER

## 2025-03-19 PROCEDURE — 99214 OFFICE O/P EST MOD 30 MIN: CPT | Performed by: NURSE PRACTITIONER

## 2025-03-19 ASSESSMENT — ENCOUNTER SYMPTOMS
WHEEZING: 0
VOMITING: 0
ABDOMINAL PAIN: 0
COUGH: 0
TROUBLE SWALLOWING: 0
SORE THROAT: 0
SINUS PAIN: 0
VOICE CHANGE: 0
DIARRHEA: 0
NAUSEA: 0
RHINORRHEA: 0
SHORTNESS OF BREATH: 0
SINUS PRESSURE: 0
CHEST TIGHTNESS: 0

## 2025-03-19 NOTE — PROGRESS NOTES
medications on file prior to visit.      Social History     Tobacco Use    Smoking status: Former     Current packs/day: 0.00     Average packs/day: 1 pack/day for 26.0 years (26.0 ttl pk-yrs)     Types: Cigarettes     Start date:      Quit date:      Years since quittin.2    Smokeless tobacco: Never    Tobacco comments:     no smoking about 15 years   Substance Use Topics    Alcohol use: Yes     Alcohol/week: 1.0 standard drink of alcohol     Types: 1 Glasses of wine per week        CARE TEAM  Patient Care Team:  No Miller MD as PCP - General (Internal Medicine)  No Miller MD as PCP - Empaneled Provider    Electronically signed by MARY Orozco CNP on 3/19/2025 at 3:40 PM     This dictation was generated by voice recognition computer software.  Although all attempts are made to edit the dictation for accuracy, there may be errors in the transcription that are not intended.

## 2025-03-24 ENCOUNTER — TELEPHONE (OUTPATIENT)
Dept: PRIMARY CARE CLINIC | Age: 65
End: 2025-03-24

## 2025-03-24 DIAGNOSIS — H93.13 TINNITUS OF BOTH EARS: Primary | ICD-10-CM

## 2025-03-24 NOTE — TELEPHONE ENCOUNTER
Pt called in asking for a referral to go to an ENT office so she can have a hearing test done. Pt saw Maia 3/19/25 for tinnitus. Pt is asking for one to be put in for [facility]     Located in: [facility]  Address: [address], [place] [id number]  Hours: Open now ·   Add full hours  Phone: [phone]

## 2025-03-24 NOTE — TELEPHONE ENCOUNTER
Fax external ENT referral to [facility]. Call for fax number.

## 2025-03-25 NOTE — TELEPHONE ENCOUNTER
Spoke with pt and she actually was able to get in sooner at [facility] the soonest. Pt now needs a referral for them!    Located in: [facility]  Address: [address], [place] [address]

## 2025-04-07 PROBLEM — Z00.00 INITIAL MEDICARE ANNUAL WELLNESS VISIT: Status: RESOLVED | Noted: 2025-03-08 | Resolved: 2025-04-07

## 2025-04-11 ENCOUNTER — OFFICE VISIT (OUTPATIENT)
Dept: ENT CLINIC | Age: 65
End: 2025-04-11

## 2025-04-11 ENCOUNTER — PROCEDURE VISIT (OUTPATIENT)
Dept: AUDIOLOGY | Age: 65
End: 2025-04-11
Payer: MEDICARE

## 2025-04-11 VITALS
RESPIRATION RATE: 16 BRPM | DIASTOLIC BLOOD PRESSURE: 80 MMHG | WEIGHT: 179 LBS | HEIGHT: 62 IN | TEMPERATURE: 97.4 F | BODY MASS INDEX: 32.94 KG/M2 | SYSTOLIC BLOOD PRESSURE: 132 MMHG | HEART RATE: 63 BPM

## 2025-04-11 DIAGNOSIS — H90.3 SENSORINEURAL HEARING LOSS (SNHL) OF BOTH EARS: Primary | ICD-10-CM

## 2025-04-11 DIAGNOSIS — H90.3 SENSORINEURAL HEARING LOSS, BILATERAL: Primary | ICD-10-CM

## 2025-04-11 DIAGNOSIS — H93.13 TINNITUS OF BOTH EARS: ICD-10-CM

## 2025-04-11 DIAGNOSIS — H93.11 TINNITUS OF RIGHT EAR: ICD-10-CM

## 2025-04-11 PROCEDURE — 92557 COMPREHENSIVE HEARING TEST: CPT

## 2025-04-11 PROCEDURE — 92567 TYMPANOMETRY: CPT

## 2025-04-11 ASSESSMENT — ENCOUNTER SYMPTOMS
SINUS PAIN: 0
STRIDOR: 0
CHOKING: 0
VOICE CHANGE: 0
EYE ITCHING: 0
TROUBLE SWALLOWING: 0
NAUSEA: 0
COLOR CHANGE: 0
PHOTOPHOBIA: 0
SINUS PRESSURE: 0
WHEEZING: 0
SORE THROAT: 0
BLOOD IN STOOL: 0
SHORTNESS OF BREATH: 0
RHINORRHEA: 0
VOMITING: 0
CONSTIPATION: 0
DIARRHEA: 0
COUGH: 0
BACK PAIN: 0
EYE DISCHARGE: 0
FACIAL SWELLING: 0

## 2025-04-11 NOTE — PROGRESS NOTES
The tympanic membrane is intact. Ossicles appear intact. No fluid visualized in the middle ear.        Assessment and Plan     1. Sensorineural hearing loss (SNHL) of both ears  Audiogram findings reviewed with the patient.  She does have some high-frequency hearing loss that could be the inciting factor for her tinnitus in combination with Tylenol use.  There is a very slight asymmetry in the right ear compared to the left.  Tinnitus symptoms have improved.  She only notices at night now.  Discussed tinnitus masking measures and Lipoflavonoid's.  Discussed tinnitus exacerbating factors such as high sodium, sugar, caffeine, nicotine, alcohol, stress and lack of sleep.  Recommend a repeat audiogram in 1 year.  If symptoms change in the meantime she should follow-up sooner    2. Tinnitus of both ears        Return in about 1 year (around 4/11/2026) for repeat hearing test.      [ ] Review/order radiology tests   [ ] Independent interpretation of diagnostic test by another provider  [ ] Discussed case with another provider  [ ] High risk of loss of major body function  [ ] Elective major surgery with risk factors    Portions of this note were dictated using Dragon. There may be linguistic errors secondary to the use of this program.

## 2025-04-11 NOTE — PATIENT INSTRUCTIONS
Raise your voice slightly (do not scream)   - Speak slowly and distinctly   - Use short, simple sentences   - Rephrase your words if the person is having a hard time understanding you    - To avoid distortion, don’t speak directly into a person’s ear      Some additional items that may be helpful:   - Remain patient - this is important for both parties   - Write down items that still cannot be heard/understood.  You may write with pen/paper or consider typing/texting on a cell phone or smart device.   - If background noise is unavoidable, try to keep yourself in a good position in the room.  By sitting at a gong on the side of the restaurant (preferably a corner), it will be easier to communicate than if you were sitting at a table in the middle with background noise surrounding you.  Keep yourself positioned away from music speakers or heavy foot traffic.

## 2025-04-11 NOTE — PROGRESS NOTES
Jordyn Manzano   1960, 65 y.o. female   [id number]       Referring Provider: No Miller MD   Referral Type: In an order in Epic    Reason for Visit: Evaluation of suspected change in hearing, tinnitus, or balance.    ADULT AUDIOLOGIC EVALUATION      Jordyn Manzano is a 65 y.o. female seen today, 4/11/2025 , for an initial audiologic evaluation.  Patient was seen by Carlos Jimenes DO following today's evaluation.    AUDIOLOGIC AND OTHER PERTINENT MEDICAL HISTORY:      Jordyn Manzano reports having right sided constant tinnitus described as a buzzing sound for the last couple of months. She notes having some life stress, lack of sleep, and medication change around the time of onset. She notes she was prescribed 650mg of Tylenol for 3x/day. She has since discontinued taking the Tylenol in hopes that the tinnitus would subside.  At recent PCP visit, it was noted she had elevated BP which she contributed to this new onset of tinnitus and some jaw pain. Both of these issues have subsided since that visit.    She denied otalgia, aural fullness, otorrhea, dizziness, imbalance, history of falls, history of noise exposure, history of head trauma, history of ear surgery, and family history of hearing loss    Date: 4/11/2025     IMPRESSIONS:      Today's results revealed sensorineural hearing loss with excellent speech understanding when in quiet, bilaterally. Tympanometry indicates normal middle ear function. Discussed test results and implications with patient. Discussed possible benefits of amplification.  Discussed use of tinnitus management strategies.  Follow medical recommendations of Carlos Jimenes DO.     ASSESSMENT AND FINDINGS:     Otoscopy unremarkable.    RIGHT EAR:  Hearing Sensitivity: Normal to moderate sensorineural hearing loss  Speech Recognition Threshold: 15 dB HL  Word Recognition: Excellent 90%, based on NU-6 by difficulty-word list at 55 dBHL using recorded speech stimuli.    Tympanometry:

## 2025-04-17 DIAGNOSIS — E03.9 ACQUIRED HYPOTHYROIDISM: ICD-10-CM

## 2025-04-17 NOTE — TELEPHONE ENCOUNTER
Medication:   Requested Prescriptions     Pending Prescriptions Disp Refills    levothyroxine (SYNTHROID) 88 MCG tablet [Pharmacy Med Name: LEVOTHYROXINE 88 MCG TABLET] 90 tablet 0     Sig: TAKE 1 TABLET BY MOUTH DAILY     Last Filled:  01/09/2025    Last appt: 3/19/2025   Next appt: 4/28/2025    Last OARRS:        No data to display

## 2025-04-19 RX ORDER — LEVOTHYROXINE SODIUM 88 UG/1
88 TABLET ORAL DAILY
Qty: 90 TABLET | Refills: 1 | Status: SHIPPED | OUTPATIENT
Start: 2025-04-19

## 2025-04-25 DIAGNOSIS — I10 ESSENTIAL HYPERTENSION: ICD-10-CM

## 2025-04-25 DIAGNOSIS — E03.9 ACQUIRED HYPOTHYROIDISM: ICD-10-CM

## 2025-04-25 DIAGNOSIS — I25.10 NONOCCLUSIVE CORONARY ATHEROSCLEROSIS OF NATIVE CORONARY ARTERY: ICD-10-CM

## 2025-04-25 DIAGNOSIS — R73.03 PREDIABETES: ICD-10-CM

## 2025-04-25 LAB
ALBUMIN SERPL-MCNC: 4.4 G/DL (ref 3.4–5)
ALBUMIN/GLOB SERPL: 2.6 {RATIO} (ref 1.1–2.2)
ALP SERPL-CCNC: 122 U/L (ref 40–129)
ALT SERPL-CCNC: 21 U/L (ref 10–40)
ANION GAP SERPL CALCULATED.3IONS-SCNC: 8 MMOL/L (ref 3–16)
AST SERPL-CCNC: 18 U/L (ref 15–37)
BILIRUB SERPL-MCNC: 0.6 MG/DL (ref 0–1)
BUN SERPL-MCNC: 13 MG/DL (ref 7–20)
CALCIUM SERPL-MCNC: 9.8 MG/DL (ref 8.3–10.6)
CHLORIDE SERPL-SCNC: 106 MMOL/L (ref 99–110)
CHOLEST SERPL-MCNC: 161 MG/DL (ref 0–199)
CO2 SERPL-SCNC: 28 MMOL/L (ref 21–32)
CREAT SERPL-MCNC: 0.8 MG/DL (ref 0.6–1.2)
EST. AVERAGE GLUCOSE BLD GHB EST-MCNC: 116.9 MG/DL
GFR SERPLBLD CREATININE-BSD FMLA CKD-EPI: 81 ML/MIN/{1.73_M2}
GLUCOSE SERPL-MCNC: 128 MG/DL (ref 70–99)
HBA1C MFR BLD: 5.7 %
HDLC SERPL-MCNC: 49 MG/DL (ref 40–60)
LDLC SERPL CALC-MCNC: 95 MG/DL
POTASSIUM SERPL-SCNC: 4.5 MMOL/L (ref 3.5–5.1)
PROT SERPL-MCNC: 6.1 G/DL (ref 6.4–8.2)
SODIUM SERPL-SCNC: 142 MMOL/L (ref 136–145)
TRIGL SERPL-MCNC: 86 MG/DL (ref 0–150)
TSH SERPL DL<=0.005 MIU/L-ACNC: 0.73 UIU/ML (ref 0.27–4.2)
VLDLC SERPL CALC-MCNC: 17 MG/DL

## 2025-04-28 ENCOUNTER — OFFICE VISIT (OUTPATIENT)
Dept: PRIMARY CARE CLINIC | Age: 65
End: 2025-04-28
Payer: MEDICARE

## 2025-04-28 VITALS
HEART RATE: 65 BPM | BODY MASS INDEX: 32.2 KG/M2 | RESPIRATION RATE: 14 BRPM | SYSTOLIC BLOOD PRESSURE: 128 MMHG | OXYGEN SATURATION: 95 % | WEIGHT: 175 LBS | HEIGHT: 62 IN | DIASTOLIC BLOOD PRESSURE: 80 MMHG

## 2025-04-28 DIAGNOSIS — I25.10 NONOCCLUSIVE CORONARY ATHEROSCLEROSIS OF NATIVE CORONARY ARTERY: ICD-10-CM

## 2025-04-28 DIAGNOSIS — R73.03 PREDIABETES: ICD-10-CM

## 2025-04-28 DIAGNOSIS — E03.9 ACQUIRED HYPOTHYROIDISM: ICD-10-CM

## 2025-04-28 DIAGNOSIS — I10 ESSENTIAL HYPERTENSION: Primary | ICD-10-CM

## 2025-04-28 DIAGNOSIS — F51.01 PRIMARY INSOMNIA: ICD-10-CM

## 2025-04-28 PROCEDURE — 1160F RVW MEDS BY RX/DR IN RCRD: CPT | Performed by: INTERNAL MEDICINE

## 2025-04-28 PROCEDURE — 1123F ACP DISCUSS/DSCN MKR DOCD: CPT | Performed by: INTERNAL MEDICINE

## 2025-04-28 PROCEDURE — 3074F SYST BP LT 130 MM HG: CPT | Performed by: INTERNAL MEDICINE

## 2025-04-28 PROCEDURE — 1159F MED LIST DOCD IN RCRD: CPT | Performed by: INTERNAL MEDICINE

## 2025-04-28 PROCEDURE — 3079F DIAST BP 80-89 MM HG: CPT | Performed by: INTERNAL MEDICINE

## 2025-04-28 PROCEDURE — G2211 COMPLEX E/M VISIT ADD ON: HCPCS | Performed by: INTERNAL MEDICINE

## 2025-04-28 PROCEDURE — 99214 OFFICE O/P EST MOD 30 MIN: CPT | Performed by: INTERNAL MEDICINE

## 2025-04-28 RX ORDER — METOPROLOL SUCCINATE 25 MG/1
25 TABLET, EXTENDED RELEASE ORAL DAILY
COMMUNITY

## 2025-04-28 RX ORDER — CELECOXIB 200 MG/1
CAPSULE ORAL
COMMUNITY
Start: 2025-04-25

## 2025-04-28 RX ORDER — LOSARTAN POTASSIUM 50 MG/1
50 TABLET ORAL DAILY
Qty: 90 TABLET | Refills: 1 | Status: SHIPPED | OUTPATIENT
Start: 2025-04-28

## 2025-04-28 RX ORDER — AMLODIPINE BESYLATE 5 MG/1
TABLET ORAL
Qty: 90 TABLET | Refills: 1 | Status: SHIPPED | OUTPATIENT
Start: 2025-04-28

## 2025-04-28 RX ORDER — METOPROLOL SUCCINATE 25 MG/1
12.5 TABLET, EXTENDED RELEASE ORAL DAILY
Qty: 45 TABLET | Refills: 1 | Status: SHIPPED | OUTPATIENT
Start: 2025-04-28

## 2025-04-28 RX ORDER — LOVASTATIN 10 MG/1
TABLET ORAL
COMMUNITY
Start: 2025-04-28

## 2025-04-28 SDOH — ECONOMIC STABILITY: FOOD INSECURITY: WITHIN THE PAST 12 MONTHS, THE FOOD YOU BOUGHT JUST DIDN'T LAST AND YOU DIDN'T HAVE MONEY TO GET MORE.: NEVER TRUE

## 2025-04-28 SDOH — ECONOMIC STABILITY: FOOD INSECURITY: WITHIN THE PAST 12 MONTHS, YOU WORRIED THAT YOUR FOOD WOULD RUN OUT BEFORE YOU GOT MONEY TO BUY MORE.: NEVER TRUE

## 2025-04-28 ASSESSMENT — PATIENT HEALTH QUESTIONNAIRE - PHQ9
SUM OF ALL RESPONSES TO PHQ QUESTIONS 1-9: 0
2. FEELING DOWN, DEPRESSED OR HOPELESS: NOT AT ALL
1. LITTLE INTEREST OR PLEASURE IN DOING THINGS: NOT AT ALL
SUM OF ALL RESPONSES TO PHQ QUESTIONS 1-9: 0

## 2025-04-28 NOTE — PROGRESS NOTES
Date of Visit: 2025    Jordyn Manzano (:  1960) is a 65 y.o. female,  Established patient here for evaluation of the following chief complaint(s):  Hypertension, Insomnia, prediabetes, and Hypothyroidism      ASSESSMENT/PLAN:    Assessment & Plan  1. Essential hypertension  - stable  - Continue metoprolol 25 mg, half tablet once daily  - Continue amlodipine 5 mg once daily  - Continue losartan 50 mg once daily  - low sodium diet  - regular aerobic exercise    2. Primary insomnia  - not controlled  - start melatonin 5 mg nightly, 30 to 60 minutes before bedtime  - Continue trazodone 200 mg nightly     3. Acquired hypothyroidism  - stable  - Continue levothyroxine 88 mcg once daily    4. Prediabetes  - stable  - Hemoglobin A1c is 5.7%  - low carbohydrate diet  - regular aerobic exercise    5. Nonocclusive coronary atherosclerosis of native coronary artery  - stable  - Continue lovastatin 10 mg every other night   - continue aspirin 81 mg once daily  - follow up with cardiology as scheduled        Return in about 6 months (around 10/28/2025) for hypertension, hypothyroidism, prediabetes, and insomnia.    SUBJECTIVE:    History of Present Illness  The patient presents for follow-up of hypertension, insomnia, prediabetes, hypothyroidism, and nonocclusive coronary artery disease.    Hypertension is managed with amlodipine 5 mg once daily and losartan 50 mg once daily. Blood pressure monitoring reveals an average of 130/80. A low-sodium diet is maintained, and regular physical activity includes walking approximately 2 miles three times a week. During a recent visit for an ear-related issue, blood pressure was significantly elevated and did not decrease despite monitoring. Metoprolol 25 mg, half a tablet daily, was resumed, resulting in normalized blood pressure.    Insomnia is treated with trazodone 200 mg nightly, but awakenings during the night persist. Melatonin has not yet been added to the

## 2025-05-05 ENCOUNTER — RESULTS FOLLOW-UP (OUTPATIENT)
Dept: PRIMARY CARE CLINIC | Age: 65
End: 2025-05-05

## 2025-05-05 ASSESSMENT — ENCOUNTER SYMPTOMS
VOMITING: 0
ABDOMINAL PAIN: 0
COUGH: 0
CONSTIPATION: 0
SORE THROAT: 0
NAUSEA: 0
DIARRHEA: 0
CHEST TIGHTNESS: 0
WHEEZING: 0
BLOOD IN STOOL: 0
RHINORRHEA: 0
SINUS PAIN: 0
SHORTNESS OF BREATH: 0
SINUS PRESSURE: 0
TROUBLE SWALLOWING: 0

## 2025-06-23 ENCOUNTER — RESULTS FOLLOW-UP (OUTPATIENT)
Dept: PRIMARY CARE CLINIC | Age: 65
End: 2025-06-23

## 2025-06-25 ENCOUNTER — TELEPHONE (OUTPATIENT)
Dept: PHARMACY | Facility: CLINIC | Age: 65
End: 2025-06-25

## 2025-06-25 NOTE — TELEPHONE ENCOUNTER
[facility]: ADHERENCE REVIEW  Identified care gap per Cecilia: fills at [facility]: Statin adherence    Patient also appears to be prescribed: ACE/ARB  Patient not in portal.  Per reconcile losartan 50mg : LF 08/15/2024     ASSESSMENT  STATIN ADHERENCE    Insurance Records claims through 25 (Prior Year PDC = not reported; YTD PDC = FIRST FILL; Potential Fail Date: 25):   LOVASTATIN 10 MG  last filled on 25 for 90 day supply. Next refill due: 25    Prescribed si tablet/capsule daily    Per [facility]:  poor medication adherence history.  Did not push fill    Rx# [id number]    Lab Results   Component Value Date    CHOL 161 2025    TRIG 86 2025    HDL 49 2025     Lab Results   Component Value Date    LDL 95 2025      ALT   Date Value Ref Range Status   2025 21 10 - 40 U/L Final     AST   Date Value Ref Range Status   2025 18 15 - 37 U/L Final     The 10-year ASCVD risk score (Dorie DK, et al., 2019) is: 8.5%    Values used to calculate the score:      Age: 65 years      Sex: Female      Is Non- : Yes      Diabetic: No      Tobacco smoker: No      Systolic Blood Pressure: 128 mmHg      Is BP treated: Yes      HDL Cholesterol: 49 mg/dL      Total Cholesterol: 161 mg/dL       The following are interventions that have been identified:   Patient OVERDUE refilling LOVASTATIN 10 MG and losartan 50mg  and active on home medication list.     Attempting to reach patient to review.  Left message asking for return call. [employer] message sent to patient.        Last Visit: 25  Next Visit: 10/31/25      Maira Horner CPhT.   Population Health Clinical   [facility]  Toll free: [phone] Option 1     For Pharmacy Admin Tracking Only    Program: [employer]  CPA in place:  No  Gap Closed?: No   Time Spent (min): 15

## 2025-07-11 NOTE — TELEPHONE ENCOUNTER
NP Referral Physical Therapy Discharge/Treatment    Patient Name: Merle Joshua  MRN: [id number]  Today's Date: 7/11/2025  Time Calculation  Start Time: 0803  Stop Time: 0833  Time Calculation (min): 30 min  PT Therapeutic Procedures Time Entry  Therapeutic Activity Time Entry: 30      Insurance:  Visit number: 19 of 20  Authorization info: AUTH AFTER 30 VISITS / 100% COVERAGE / 30V   Insurance Type: Payor: [place] [employer] / Plan: [place] [employer] / Product Type: *No Product type* /   Insurance Type: Payor: [place] [employer] / Plan: [place] [employer] / Product Type: *No Product type* /     Current Problem   1. Neck pain  Follow Up In Physical Therapy      2. Chronic bilateral thoracic back pain  Follow Up In Physical Therapy          Subjective   General    Pt reports she is feeling good today with a little bit of tightness. She is compliant with HEP.   Precautions:   None   Pain    Minimal knee pain   Post Treatment Pain Level same     Objective   Neck ROM - WNL, little pain/tightness     Shoulder/elbow ROM - WNL   Shoulder MMT:  Flex - 4+/5 B  Abd - 5/5 B  IR - 5/5 B   ER - 5/5 B     Elbow MMT:  Flex - 5/5 R, 4+/5 L   Ext - 5/5 B     Hip/knee ROM - WNL  Hip MMT:   Flex - 4/5 R, 4+/5 L   IR - 5/5 B, pain on R   ER - 5/5 B   Abd - 5/5 B     Knee   Flex - 5/5 B   Ext - 5/5 B     Treatments:  Therapeutic exercise:  UEB/LEB 5' warm up  Reassess    Assessment   Assessment:    Reassessed pt this date testing neck ROM and shoulder/elbow/hip/knee ROM and strength. Pt has improved in all ROM and strength categories with very limited continued pain in the neck and in the R knee. Pt is very appreciative for what therapy has done for her in reducing symptoms and increasing her strength. PT and pt are in agreement to discharge pt at this time and have her continue exercises on her own.     Plan:    Reassess  Discharge     OP EDUCATION:  Continue HEP    Access Code [email or web address]     Goals:    Resolved       PT Problem       Pt will be 50% IND with HEP in 4 weeks in order to progress with therapy.  (Adequate for Discharge)       Start:  12/24/24    Expected End:  01/28/25    Resolved:  07/11/25         Pt will reduce pain levels to no more than 3/10 in 4 weeks in order to improve sleep and self care tasks.  (Adequate for Discharge)       Start:  12/24/24    Expected End:  01/28/25    Resolved:  07/11/25         Pt will demonstrate full functional cervical ROM in 4 weeks for self care tasks. (Adequate for Discharge)       Start:  12/24/24    Expected End:  01/28/25    Resolved:  07/11/25         Pt will demonstrate subjective improvement of ADLs and recreational activities through improved score of 10 on NDI in 4 weeks.  (Adequate for Discharge)       Start:  12/24/24    Expected End:  01/28/25    Resolved:  07/11/25            PT Problem       Pt will be 100% IND with HEP in 8 weeks in order to maintain progress with therapy.   (Adequate for Discharge)       Start:  12/24/24    Expected End:  02/27/25    Resolved:  07/11/25         Pt will reduce pain levels to no more than 1/10 in 8 weeks in order to improve sleep, cooking/cleaning and self care tasks.  (Adequate for Discharge)       Start:  12/24/24    Expected End:  02/27/25    Resolved:  07/11/25         Pt will improve B scapular/GH strength to 5/5 in 8 weeks in order to improve strength required to lift objects at home including groceries and to improve cleaning tasks.   (Adequate for Discharge)       Start:  12/24/24    Expected End:  02/27/25    Resolved:  07/11/25         Pt will demonstrate subjective improvement of ADLs and recreational activities through improved score of 0 on NDI in 8 weeks.  (Adequate for Discharge)       Start:  12/24/24    Expected End:  02/27/25    Resolved:  07/11/25

## 2025-08-29 ENCOUNTER — HOSPITAL ENCOUNTER (OUTPATIENT)
Dept: GENERAL RADIOLOGY | Age: 65
Discharge: HOME | End: 2025-08-29
Payer: MEDICARE

## 2025-08-29 DIAGNOSIS — Z78.0 POSTMENOPAUSE: ICD-10-CM

## 2025-08-29 PROCEDURE — 77080 DXA BONE DENSITY AXIAL: CPT
